# Patient Record
Sex: FEMALE | Race: WHITE | NOT HISPANIC OR LATINO | Employment: UNEMPLOYED | URBAN - NONMETROPOLITAN AREA
[De-identification: names, ages, dates, MRNs, and addresses within clinical notes are randomized per-mention and may not be internally consistent; named-entity substitution may affect disease eponyms.]

---

## 2018-02-23 ENCOUNTER — OFFICE VISIT CONVERTED (OUTPATIENT)
Dept: FAMILY MEDICINE CLINIC | Age: 48
End: 2018-02-23
Attending: NURSE PRACTITIONER

## 2018-12-21 ENCOUNTER — OFFICE VISIT CONVERTED (OUTPATIENT)
Dept: FAMILY MEDICINE CLINIC | Age: 48
End: 2018-12-21
Attending: NURSE PRACTITIONER

## 2019-01-07 ENCOUNTER — HOSPITAL ENCOUNTER (OUTPATIENT)
Dept: OTHER | Facility: HOSPITAL | Age: 49
Discharge: HOME | End: 2019-01-07
Attending: NURSE PRACTITIONER

## 2019-06-07 ENCOUNTER — OFFICE VISIT CONVERTED (OUTPATIENT)
Dept: FAMILY MEDICINE CLINIC | Age: 49
End: 2019-06-07
Attending: NURSE PRACTITIONER

## 2019-06-07 ENCOUNTER — HOSPITAL ENCOUNTER (OUTPATIENT)
Dept: OTHER | Facility: HOSPITAL | Age: 49
Discharge: HOME | End: 2019-06-07
Attending: NURSE PRACTITIONER

## 2019-06-07 LAB
ANION GAP SERPL CALC-SCNC: 17 MMOL/L (ref 8–19)
BUN SERPL-MCNC: 14 MG/DL (ref 5–25)
BUN/CREAT SERPL: 16 {RATIO} (ref 6–20)
CALCIUM SERPL-MCNC: 9.2 MG/DL (ref 8.7–10.4)
CHLORIDE SERPL-SCNC: 101 MMOL/L (ref 99–111)
CHOLEST SERPL-MCNC: 195 MG/DL (ref 107–200)
CHOLEST/HDLC SERPL: 4.6 {RATIO} (ref 3–6)
CONV CO2: 25 MMOL/L (ref 22–32)
CREAT UR-MCNC: 0.86 MG/DL (ref 0.5–0.9)
EST. AVERAGE GLUCOSE BLD GHB EST-MCNC: 126 MG/DL
GFR SERPLBLD BASED ON 1.73 SQ M-ARVRAT: >60 ML/MIN/{1.73_M2}
GLUCOSE SERPL-MCNC: 111 MG/DL (ref 65–99)
HBA1C MFR BLD: 6 % (ref 3.5–5.7)
HDLC SERPL-MCNC: 42 MG/DL (ref 40–60)
LDLC SERPL CALC-MCNC: 108 MG/DL (ref 70–100)
OSMOLALITY SERPL CALC.SUM OF ELEC: 289 MOSM/KG (ref 273–304)
POTASSIUM SERPL-SCNC: 4.4 MMOL/L (ref 3.5–5.3)
SODIUM SERPL-SCNC: 139 MMOL/L (ref 135–147)
TRIGL SERPL-MCNC: 223 MG/DL (ref 40–150)
VLDLC SERPL-MCNC: 45 MG/DL (ref 5–37)

## 2019-12-02 ENCOUNTER — OFFICE VISIT CONVERTED (OUTPATIENT)
Dept: FAMILY MEDICINE CLINIC | Age: 49
End: 2019-12-02
Attending: NURSE PRACTITIONER

## 2019-12-02 ENCOUNTER — HOSPITAL ENCOUNTER (OUTPATIENT)
Dept: OTHER | Facility: HOSPITAL | Age: 49
Discharge: HOME | End: 2019-12-02
Attending: NURSE PRACTITIONER

## 2019-12-02 LAB
ALBUMIN SERPL-MCNC: 4.6 G/DL (ref 3.5–5)
ALBUMIN/GLOB SERPL: 1.6 {RATIO} (ref 1.4–2.6)
ALP SERPL-CCNC: 81 U/L (ref 42–98)
ALT SERPL-CCNC: 26 U/L (ref 10–40)
ANION GAP SERPL CALC-SCNC: 21 MMOL/L (ref 8–19)
AST SERPL-CCNC: 20 U/L (ref 15–50)
BILIRUB SERPL-MCNC: 0.66 MG/DL (ref 0.2–1.3)
BUN SERPL-MCNC: 12 MG/DL (ref 5–25)
BUN/CREAT SERPL: 13 {RATIO} (ref 6–20)
CALCIUM SERPL-MCNC: 9.6 MG/DL (ref 8.7–10.4)
CHLORIDE SERPL-SCNC: 98 MMOL/L (ref 99–111)
CHOLEST SERPL-MCNC: 177 MG/DL (ref 107–200)
CHOLEST/HDLC SERPL: 4.5 {RATIO} (ref 3–6)
CONV CO2: 23 MMOL/L (ref 22–32)
CONV TOTAL PROTEIN: 7.5 G/DL (ref 6.3–8.2)
CREAT UR-MCNC: 0.93 MG/DL (ref 0.5–0.9)
ERYTHROCYTE [DISTWIDTH] IN BLOOD BY AUTOMATED COUNT: 12.6 % (ref 11.5–14.5)
EST. AVERAGE GLUCOSE BLD GHB EST-MCNC: 148 MG/DL
GFR SERPLBLD BASED ON 1.73 SQ M-ARVRAT: >60 ML/MIN/{1.73_M2}
GLOBULIN UR ELPH-MCNC: 2.9 G/DL (ref 2–3.5)
GLUCOSE SERPL-MCNC: 116 MG/DL (ref 65–99)
HBA1C MFR BLD: 15.7 G/DL (ref 12–16)
HBA1C MFR BLD: 6.8 % (ref 3.5–5.7)
HCT VFR BLD AUTO: 44.6 % (ref 37–47)
HDLC SERPL-MCNC: 39 MG/DL (ref 40–60)
LDLC SERPL CALC-MCNC: 97 MG/DL (ref 70–100)
MCH RBC QN AUTO: 31.8 PG (ref 27–31)
MCHC RBC AUTO-ENTMCNC: 35.2 G/DL (ref 33–37)
MCV RBC AUTO: 90.5 FL (ref 81–99)
OSMOLALITY SERPL CALC.SUM OF ELEC: 287 MOSM/KG (ref 273–304)
PLATELET # BLD AUTO: 253 10*3/UL (ref 130–400)
PMV BLD AUTO: 8.7 FL (ref 7.4–10.4)
POTASSIUM SERPL-SCNC: 4 MMOL/L (ref 3.5–5.3)
RBC # BLD AUTO: 4.93 10*6/UL (ref 4.2–5.4)
SODIUM SERPL-SCNC: 138 MMOL/L (ref 135–147)
TRIGL SERPL-MCNC: 206 MG/DL (ref 40–150)
VLDLC SERPL-MCNC: 41 MG/DL (ref 5–37)
WBC # BLD AUTO: 10.87 10*3/UL (ref 4.8–10.8)

## 2019-12-04 LAB — BACTERIA UR CULT: NORMAL

## 2020-09-16 ENCOUNTER — HOSPITAL ENCOUNTER (OUTPATIENT)
Dept: OTHER | Facility: HOSPITAL | Age: 50
Discharge: HOME | End: 2020-09-16
Attending: NURSE PRACTITIONER

## 2020-09-16 ENCOUNTER — OFFICE VISIT CONVERTED (OUTPATIENT)
Dept: FAMILY MEDICINE CLINIC | Age: 50
End: 2020-09-16
Attending: NURSE PRACTITIONER

## 2020-09-16 LAB
ALBUMIN SERPL-MCNC: 4.1 G/DL (ref 3.5–5)
ALBUMIN/GLOB SERPL: 1.6 {RATIO} (ref 1.4–2.6)
ALP SERPL-CCNC: 88 U/L (ref 42–98)
ALT SERPL-CCNC: 24 U/L (ref 10–40)
ANION GAP SERPL CALC-SCNC: 18 MMOL/L (ref 8–19)
AST SERPL-CCNC: 19 U/L (ref 15–50)
BASOPHILS # BLD AUTO: 0.08 10*3/UL (ref 0–0.2)
BASOPHILS NFR BLD AUTO: 0.7 % (ref 0–3)
BILIRUB SERPL-MCNC: 0.32 MG/DL (ref 0.2–1.3)
BUN SERPL-MCNC: 20 MG/DL (ref 5–25)
BUN/CREAT SERPL: 24 {RATIO} (ref 6–20)
CALCIUM SERPL-MCNC: 9.3 MG/DL (ref 8.7–10.4)
CHLORIDE SERPL-SCNC: 99 MMOL/L (ref 99–111)
CHOLEST SERPL-MCNC: 227 MG/DL (ref 107–200)
CHOLEST/HDLC SERPL: 5.5 {RATIO} (ref 3–6)
CONV ABS IMM GRAN: 0.05 10*3/UL (ref 0–0.2)
CONV CO2: 24 MMOL/L (ref 22–32)
CONV IMMATURE GRAN: 0.4 % (ref 0–1.8)
CONV TOTAL PROTEIN: 6.7 G/DL (ref 6.3–8.2)
CREAT UR-MCNC: 0.85 MG/DL (ref 0.5–0.9)
DEPRECATED RDW RBC AUTO: 46.1 FL (ref 36.4–46.3)
EOSINOPHIL # BLD AUTO: 0.35 10*3/UL (ref 0–0.7)
EOSINOPHIL # BLD AUTO: 2.9 % (ref 0–7)
ERYTHROCYTE [DISTWIDTH] IN BLOOD BY AUTOMATED COUNT: 13.2 % (ref 11.7–14.4)
EST. AVERAGE GLUCOSE BLD GHB EST-MCNC: 157 MG/DL
GFR SERPLBLD BASED ON 1.73 SQ M-ARVRAT: >60 ML/MIN/{1.73_M2}
GLOBULIN UR ELPH-MCNC: 2.6 G/DL (ref 2–3.5)
GLUCOSE SERPL-MCNC: 178 MG/DL (ref 65–99)
HBA1C MFR BLD: 7.1 % (ref 3.5–5.7)
HCT VFR BLD AUTO: 47 % (ref 37–47)
HDLC SERPL-MCNC: 41 MG/DL (ref 40–60)
HGB BLD-MCNC: 15.6 G/DL (ref 12–16)
LDLC SERPL CALC-MCNC: 142 MG/DL (ref 70–100)
LYMPHOCYTES # BLD AUTO: 3.36 10*3/UL (ref 1–5)
LYMPHOCYTES NFR BLD AUTO: 27.7 % (ref 20–45)
MCH RBC QN AUTO: 31.5 PG (ref 27–31)
MCHC RBC AUTO-ENTMCNC: 33.2 G/DL (ref 33–37)
MCV RBC AUTO: 94.9 FL (ref 81–99)
MONOCYTES # BLD AUTO: 0.78 10*3/UL (ref 0.2–1.2)
MONOCYTES NFR BLD AUTO: 6.4 % (ref 3–10)
NEUTROPHILS # BLD AUTO: 7.52 10*3/UL (ref 2–8)
NEUTROPHILS NFR BLD AUTO: 61.9 % (ref 30–85)
NRBC CBCN: 0 % (ref 0–0.7)
OSMOLALITY SERPL CALC.SUM OF ELEC: 291 MOSM/KG (ref 273–304)
PLATELET # BLD AUTO: 279 10*3/UL (ref 130–400)
PMV BLD AUTO: 9.4 FL (ref 9.4–12.3)
POTASSIUM SERPL-SCNC: 4.3 MMOL/L (ref 3.5–5.3)
RBC # BLD AUTO: 4.95 10*6/UL (ref 4.2–5.4)
SODIUM SERPL-SCNC: 137 MMOL/L (ref 135–147)
TRIGL SERPL-MCNC: 458 MG/DL (ref 40–150)
TSH SERPL-ACNC: 1.85 M[IU]/L (ref 0.27–4.2)
WBC # BLD AUTO: 12.14 10*3/UL (ref 4.8–10.8)

## 2020-11-17 ENCOUNTER — HOSPITAL ENCOUNTER (OUTPATIENT)
Dept: OTHER | Facility: HOSPITAL | Age: 50
Discharge: HOME | End: 2020-11-17
Attending: NURSE PRACTITIONER

## 2021-01-11 ENCOUNTER — HOSPITAL ENCOUNTER (OUTPATIENT)
Dept: OTHER | Facility: HOSPITAL | Age: 51
Discharge: HOME | End: 2021-01-11
Attending: NURSE PRACTITIONER

## 2021-01-11 ENCOUNTER — OFFICE VISIT CONVERTED (OUTPATIENT)
Dept: FAMILY MEDICINE CLINIC | Age: 51
End: 2021-01-11
Attending: NURSE PRACTITIONER

## 2021-01-11 LAB
ALBUMIN SERPL-MCNC: 4.1 G/DL (ref 3.5–5)
ALBUMIN/GLOB SERPL: 1.6 {RATIO} (ref 1.4–2.6)
ALP SERPL-CCNC: 80 U/L (ref 42–98)
ALT SERPL-CCNC: 30 U/L (ref 10–40)
ANION GAP SERPL CALC-SCNC: 14 MMOL/L (ref 8–19)
AST SERPL-CCNC: 23 U/L (ref 15–50)
BASOPHILS # BLD MANUAL: 0.05 10*3/UL (ref 0–0.2)
BASOPHILS NFR BLD MANUAL: 0.6 % (ref 0–3)
BILIRUB SERPL-MCNC: 0.66 MG/DL (ref 0.2–1.3)
BUN SERPL-MCNC: 11 MG/DL (ref 5–25)
BUN/CREAT SERPL: 13 {RATIO} (ref 6–20)
CALCIUM SERPL-MCNC: 8.9 MG/DL (ref 8.7–10.4)
CHLORIDE SERPL-SCNC: 101 MMOL/L (ref 99–111)
CHOLEST SERPL-MCNC: 197 MG/DL (ref 107–200)
CHOLEST/HDLC SERPL: 4.9 {RATIO} (ref 3–6)
CONV CO2: 26 MMOL/L (ref 22–32)
CONV CREATININE URINE, RANDOM: 61.4 MG/DL (ref 10–300)
CONV MICROALBUM.,U,RANDOM: 926.6 MG/L (ref 0–20)
CONV TOTAL PROTEIN: 6.7 G/DL (ref 6.3–8.2)
CREAT UR-MCNC: 0.86 MG/DL (ref 0.5–0.9)
DEPRECATED RDW RBC AUTO: 44.4 FL
EOSINOPHIL # BLD MANUAL: 0.24 10*3/UL (ref 0–0.7)
EOSINOPHIL NFR BLD MANUAL: 3 % (ref 0–7)
ERYTHROCYTE [DISTWIDTH] IN BLOOD BY AUTOMATED COUNT: 13 % (ref 11.5–14.5)
EST. AVERAGE GLUCOSE BLD GHB EST-MCNC: 148 MG/DL
GFR SERPLBLD BASED ON 1.73 SQ M-ARVRAT: >60 ML/MIN/{1.73_M2}
GLOBULIN UR ELPH-MCNC: 2.6 G/DL (ref 2–3.5)
GLUCOSE SERPL-MCNC: 129 MG/DL (ref 65–99)
GRANS (ABSOLUTE): 3.99 10*3/UL (ref 2–8)
GRANS: 50.5 % (ref 30–85)
HBA1C MFR BLD: 15.2 G/DL (ref 12–16)
HBA1C MFR BLD: 6.8 % (ref 3.5–5.7)
HCT VFR BLD AUTO: 44 % (ref 37–47)
HDLC SERPL-MCNC: 40 MG/DL (ref 40–60)
IMM GRANULOCYTES # BLD: 0.02 10*3/UL (ref 0–0.54)
IMM GRANULOCYTES NFR BLD: 0.3 % (ref 0–0.43)
LDLC SERPL CALC-MCNC: 101 MG/DL (ref 70–100)
LYMPHOCYTES # BLD MANUAL: 3.05 10*3/UL (ref 1–5)
LYMPHOCYTES NFR BLD MANUAL: 7 % (ref 3–10)
MCH RBC QN AUTO: 31.9 PG (ref 27–31)
MCHC RBC AUTO-ENTMCNC: 34.5 G/DL (ref 33–37)
MCV RBC AUTO: 92.2 FL (ref 81–99)
MICROALBUMIN/CREAT UR: 1509.1 MG/G{CRE} (ref 0–35)
MONOCYTES # BLD AUTO: 0.55 10*3/UL (ref 0.2–1.2)
OSMOLALITY SERPL CALC.SUM OF ELEC: 283 MOSM/KG (ref 273–304)
PLATELET # BLD AUTO: 262 10*3/UL (ref 130–400)
PMV BLD AUTO: 8.6 FL (ref 7.4–10.4)
POTASSIUM SERPL-SCNC: 4.7 MMOL/L (ref 3.5–5.3)
RBC # BLD AUTO: 4.77 10*6/UL (ref 4.2–5.4)
SODIUM SERPL-SCNC: 136 MMOL/L (ref 135–147)
TRIGL SERPL-MCNC: 278 MG/DL (ref 40–150)
TSH SERPL-ACNC: 1.89 M[IU]/L (ref 0.27–4.2)
VARIANT LYMPHS NFR BLD MANUAL: 38.6 % (ref 20–45)
VLDLC SERPL-MCNC: 56 MG/DL (ref 5–37)
WBC # BLD AUTO: 7.9 10*3/UL (ref 4.8–10.8)

## 2021-01-13 ENCOUNTER — HOSPITAL ENCOUNTER (OUTPATIENT)
Dept: OTHER | Facility: HOSPITAL | Age: 51
Discharge: HOME | End: 2021-01-13
Attending: NURSE PRACTITIONER

## 2021-05-18 NOTE — PROGRESS NOTES
Caitlin Escalera 1970     Office/Outpatient Visit    Visit Date: Fri, Dec 21, 2018 10:34 am    Provider: Sylvia Ervin N.P. (Assistant: Sarah Spurling, MA)    Location: [facility]        Electronically signed by Sylvia Ervin N.P. on  12/22/2018 11:01:26 AM                             SUBJECTIVE:        CC:     Ms. Escalera is a 48 year old White female.  Med refills. Prevenative Exam.;         HPI:         Patient complains of health checkup.  Her last physical exam was 1 year ago.  She is status-post hysterectomy.  She performs breast self-exams occasionally.    Her last Pap smear was 4 years ago years ago.   Her last mammogram was 1 year ago.   Her last DEXA was over 5 years ago years ago.   Preventative Health updated today.  Ms. Escalera denies any history of abnormal Pap smears.  Tobacco: Current Smoker: She currently smokes every day, 1/2 to 1 pack per day.          PHQ-9 Depression Screening: Completed form scanned and in chart; Total Score 1 Alcohol Consumption Screening: Completed form scanned and in chart; Total Score 0         Complaint of bilateral polycystic ovarian syndrome..  history of - no current complaints or symptoms regarding this condition         Complaint of overweight..  Caitlin has been actively trying to lose weight  - has been decreasing carbs  - but the holidays have set her back a little  - no concerns today         In regard to the type 2 diabetes, specifically, this is type 2, non-insulin requiring diabetes.  Compliance with treatment has been poor; she skips some medication doses due to inconvenience of dosing and does not follow a diet and exercise regimen.  She follows no particular diet.  Primary symptoms reported include leg cramps.  She specifically denies blurred vision.  Depression screen is performed and is negative.      Tobacco screen: Current smoker.  Current meds include an oral hypoglycemic ( Glucophage ( 500mg bid ) ).  She reports home blood  glucose readings have been excellent, with average fasting glucoses running <120 mg/dL.  Most recent lab results include Creatinine, Serum:  0.85 (mg/dl) (02/23/2018), Glom Filt Rate, Est:  >60 (ml/min/1.73m2) (02/23/2018), Estimated Avg Glucose:  120 (mg/dL) (02/23/2018), Hemoglobin A1c:  5.8 (%) (02/23/2018).  Blood pressure has been high normal.  In regard to preventative care, not had an eye exam in some time   - reports that her right eye is a 'lazy eye'.      Physical Activity: ** Never exercises; Concurrent health problems include hypertension.          With regard to the gERD, regarding GERD - taking ranitadine OTC must take 2/day to help relieve symptoms         Concerning hyperlipidemia, compliance with treatment has been good.  She specifically denies associated symptoms, including muscle pain and weakness.  Most recent lab tests include Total Cholesterol:  146 (mg/dL) (02/23/2018), HDL:  47 (mg/dL) (02/23/2018), Triglycerides:  177 (mg/dL) (02/23/2018), LDL:  64 (mg/dL) (02/23/2018), VLDL Cholesterol:  35 (mg/dl) (02/23/2018), CHOL/HDLC RATIO:  3.1 (NA) (02/23/2018).  Concurrent health problems include diabetes.      uses clindamycin gel PRN     ROS:     CONSTITUTIONAL:  Negative for chills, fatigue and fever.      EYES:  Negative for blurred vision.      E/N/T:  Negative for ear pain and sore throat.      CARDIOVASCULAR:  Negative for chest pain and pedal edema.      RESPIRATORY:  Negative for recent cough and dyspnea.      GASTROINTESTINAL:  Positive for acid reflux symptoms and heartburn.   Negative for abdominal pain, constipation, diarrhea, nausea or vomiting.      GENITOURINARY:  Negative for dysuria and change in urine stream.      MUSCULOSKELETAL:  Positive for occasional cramping in her legs.   Negative for arthralgias or myalgias.      INTEGUMENTARY/BREAST:  Positive for moles / skin tags came up on her face (left side under eye) -  has several skin tags that she would like removed.   Negative  for pruritis or rash.      NEUROLOGICAL:  Negative for dizziness, headaches and paresthesias.      ENDOCRINE:  Negative for hair loss, polydipsia and polyphagia.      ALLERGIC/IMMUNOLOGIC:  Negative for seasonal allergies.      PSYCHIATRIC:  Negative for anxiety, depression, feelings of stress, sleep disturbance and suicidal thoughts.          PMH/FMH/SH:     Last Reviewed on 12/21/2018 11:17 AM by Sylvia Ervin    Past Medical History:                 PAST MEDICAL HISTORY         solitary working  left kidney         PREVENTIVE HEALTH MAINTENANCE             EYE EXAM: was last done been a while     MAMMOGRAM: was last done 10/3/17 with normal results     PAP SMEAR: was last done 9/2016 with normal results         Surgical History:         Cholecystectomy      Bilateral Tubal Ligation         Family History:         Positive for Type 2 Diabetes ( father ).  Father: Congestive Heart Failure;  Type 2 Diabetes     Mother: Hypothyroidism         Social History:     Occupation: Disabled (due to shoulder surgery)     Marital Status:      Children: 3 children         Tobacco/Alcohol/Supplements:     Last Reviewed on 12/21/2018 11:17 AM by Sylvia Ervin    Tobacco: Current Smoker: She currently smokes every day, 1/2 pack per day.          Supplements:  Patient admits to use of multivitamin.          Substance Abuse History:     Last Reviewed on 12/21/2018 11:17 AM by Sylvia Ervin    None         Mental Health History:     Last Reviewed on 12/21/2018 11:17 AM by Sylvia Ervin        depression         Communicable Diseases (eg STDs):     Last Reviewed on 12/21/2018 11:17 AM by Sylvia Ervin    Reportable health conditions; NEGATIVE             Current Problems:     Last Reviewed on 12/21/2018 11:17 AM by Sylvia Ervin    Lower back pain     Type 2 diabetes     Hyperlipidemia     Depression     Overweight     GERD     Bilateral polycystic ovarian syndrome     Microscopic hematuria     Screening  mammogram - other     Skin tag     Screening for depression     Dyshidrosis         Immunizations:     Td adult 4/7/2011     Hep B (adult dose) 4/7/2011     Hep B (adult dose) 5/17/2011     Hep B (adult dose) 10/22/2012     MMR  (Measles-Mumps-Rubella), live 4/7/2011     Fluzone (3 + years dose) 9/1/2017         Allergies:     Last Reviewed on 12/21/2018 11:17 AM by Sylvia Ervin    Demerol HCl:    Morphine: shortness of breath        Current Medications:     Last Reviewed on 12/21/2018 11:17 AM by Sylvia Ervin    Sertraline HCl 100mg Tablet 1 and 1/2 tablets daily     Accu-Chek Rossy Plus Meter  Kit check blood sugar 2-3 times daily  DXE11.9     Lancet   Lancet Check blood once daily as directed     Metformin HCl 500mg Tablets, Extended Release 1 tab bid     Accu-Chek Rossy Plus Test Strips  Reagent Strips check blood sugar bid  DX E11.9     Crestor 10mg Tablet 1 tab daily         OBJECTIVE:        Vitals:         Historical:     12/15/2017  Wt:   183.6lbs    09/14/2016  Wt:   187.5lbs        Current: 12/21/2018 10:44:40 AM    Ht:  5 ft, 2.25 in;  Wt: 182.4 lbs;  BMI: 33.1    T: 97.9 F (oral);  BP: 142/79 mm Hg (left arm, sitting);  P: 94 bpm (left arm (BP Cuff), sitting);  sCr: 0.85 mg/dL;  GFR: 86.20    VA: 20/70 OD, 20/20 OS (without correction)        Repeat:     10:44:59 AM     VA:    (20/70 OD,  (without correction, , 20/20 OS, , 20/20 both))         Exams:     PHYSICAL EXAM:     GENERAL: vital signs recorded - well developed, well nourished, obese;  no apparent distress;     EYES: extraocular movements intact; PERRL;     E/N/T: EARS: external auditory canal normal;  bilateral TMs are normal;  NOSE:  normal nasal mucosa, septum, turbinates, and sinuses; OROPHARYNX:  normal mucosa, dentition, gingiva, and posterior pharynx;     NECK: range of motion is normal;     RESPIRATORY: normal appearance and symmetric expansion of chest wall; normal respiratory rate and pattern with no distress; normal breath  sounds with no rales, rhonchi, wheezes or rubs;     CARDIOVASCULAR: normal rate; rhythm is regular;  no edema;     GASTROINTESTINAL: nontender; normal bowel sounds; no organomegaly;     LYMPHATIC: no enlargement of cervical or facial nodes; no supraclavicular nodes;     MUSCULOSKELETAL: normal gait; normal range of motion of all major muscle groups; no limb or joint pain with range of motion;     NEUROLOGIC: mental status: alert and oriented x 3;     PSYCHIATRIC: appropriate affect and demeanor; normal speech pattern; normal thought and perception;         ASSESSMENT           V70.0   Z00.00  Health checkup              DDx:     V79.0   Z13.89  Screening for depression              DDx:     256.4   E28.2  Bilateral polycystic ovarian syndrome              DDx:     278.02   E66.3  Overweight              DDx:     250.00   E11.9  Type 2 diabetes              DDx:     530.81   R12  GERD              DDx:     296.20   F34.1  Depression              DDx:     272.4   E78.5  Hyperlipidemia              DDx:     705.81   L30.1  Dyshidrosis              DDx:     701.9   D48.5  Skin tag              DDx:     V76.12   Z12.31  Screening mammogram - other              DDx:         ORDERS:         Radiology/Test Orders:       [id number]  Screening mammography, bilateral (2-view film study of each breast)  (Send-Out)           Lab Orders:       [id number]  BDCB2 - [facility] CBC w/o diff  (Send-Out)         88005  COMP - [facility] Comp. Metabolic Panel  (Send-Out)         72371  TSH - [facility] TSH  (Send-Out)         52418  BDUAM - [facility] Urinalysis, automated, with micro  (Send-Out)         38773  A1CEG - [facility] Hemoglobin A1C  (Send-Out)         75017  LPDP [facility] Lipid Panel  (Send-Out)           Other Orders:         Depression screen negative  (In-House)         1101F  Pt screen for fall risk; document no falls in past year or only 1 fall w/o injury in past year (KATHLEEN)  (In-House)         4004F  Pt scrnd tobacco use rcvd tobacco cessation talk  (In-House)            Negative EtOH screen  (In-House)           Calculated BMI above the upper parameter and a follow-up plan was documented in the medical record  (In-House)                   PLAN:          Health checkup     LABORATORY:  Labs ordered to be performed today include CBC W/O DIFF, Comprehensive metabolic panel, lipid panel, TSH, and urinalysis with micro.  [facility] PHQ-9 Depression Screening Completed form scanned and in chart; Total Score 0   Smoking cessation encouraged. Counseling for less than 3 minutes.  Negative alcohol screen     BMI Elevated - Follow-Up Plan: She was provided education on weight loss strategies           Orders:       [id number]  BDCB2 - [facility] CBC w/o diff  (Send-Out)         80588  COMP - [facility] Comp. Metabolic Panel  (Send-Out)         79639  TSH - [facility] TSH  (Send-Out)         80851  BDUAM - [facility] Urinalysis, automated, with micro  (Send-Out)         59424  LPDP - [facility] Lipid Panel  (Send-Out)           Depression screen negative  (In-House)         1101F  Pt screen for fall risk; document no falls in past year or only 1 fall w/o injury in past year (KATHLEEN)  (In-House)         4004F  Pt scrnd tobacco use rcvd tobacco cessation talk  (In-House)           Negative EtOH screen  (In-House)           Calculated BMI above the upper parameter and a follow-up plan was documented in the medical record  (In-House)            Screening for depression as above          Bilateral polycystic ovarian syndrome follow up with GYN PRN          Overweight continue current attempt at weight loss- low calorie, low carb, high protein          Type 2 diabetes Will check labs to see if any changes should be made to medication before resuming - follow up every 3 months in office until numbers stable     LABORATORY:  Labs ordered to be performed today include HgbA1C.            Orders:       [id number]  A1CEG - [facility] Hemoglobin A1C  (Send-Out)            GERD continue current medication She may need an EGD at some  point - weight loss, smoking cessation and dietary changes are also of benefit          Depression continue on sertraline as prescribed - follow up every 6 months          Hyperlipidemia will check labs continue taking every other night          Dyshidrosis ok to fill topical as needed - follow up annually          Skin tag Caitlin is going to go home and see how many skin tags she would like to have removed- she has a couple on her face - I will refer these to other provider in office given the location  - We will make her an appt to have these removed once we see how many she is wishing to have removed in order to allot appropriate time          Screening mammogram - other Caitlin thinks she may have had a mammogram last year at [facility] - we will try to obtain the records and if not available, will need to get an updated mammogram (our records indicate 2016 last exam)         FOLLOW-UP TESTING #1:    RADIOLOGY:  I have ordered screening mammogram to be done today.            Orders:       [id number]  Screening mammography, bilateral (2-view film study of each breast)  (Send-Out)               CHARGE CAPTURE           **Please note: ICD descriptions below are intended for billing purposes only and may not represent clinical diagnoses**        Primary Diagnosis:         V70.0 Health checkup            Z00.00    Encounter for general adult medical examination without abnormal findings              Orders:          [id number]   Preventive medicine, established patient, age 40-64 years  (In-House)                Depression screen negative  (In-House)             1101F   Pt screen for fall risk; document no falls in past year or only 1 fall w/o injury in past year (KATHLEEN)  (In-House)             4004F   Pt scrnd tobacco use rcvd tobacco cessation talk  (In-House)                Negative EtOH screen  (In-House)                Calculated BMI above the upper parameter and a follow-up plan was documented in the medical record   (In-House)           V79.0 Screening for depression            Z13.89    Encounter for screening for other disorder              Orders:          [id number] -25  Office/outpatient visit; established patient, level 4  (In-House)           256.4 Bilateral polycystic ovarian syndrome            E28.2    Polycystic ovarian syndrome    278.02 Overweight            E66.3    Overweight    250.00 Type 2 diabetes            E11.9    Type 2 diabetes mellitus without complications    530.81 GERD            R12    Heartburn    296.20 Depression            F34.1    Dysthymic disorder    272.4 Hyperlipidemia            E78.5    Hyperlipidemia, unspecified    705.81 Dyshidrosis            L30.1    Dyshidrosis [pompholyx]    701.9 Skin tag            D48.5    Neoplasm of uncertain behavior of skin    V76.12 Screening mammogram - other            Z12.31    Encounter for screening mammogram for malignant neoplasm of breast        ADDENDUMS:      ____________________________________    Addendum: 01/09/2019 12:58 PM - Nicole Wheatley         Visit Note Faxed to:        Levon Starr  (Nephrology); Number [phone]

## 2021-05-18 NOTE — PROGRESS NOTES
Caitlin Escalera 1970     Office/Outpatient Visit    Visit Date: Fri, Jun 7, 2019 08:37 am    Provider: Sylvia Ervin N.P. (Assistant: Eri Silverio MA)    Location: [facility]        Electronically signed by Sylvia Ervin N.P. on  06/07/2019 10:35:49 AM                             SUBJECTIVE:        CC: (HASNT TAKEN SERTRALINE IN A MONTH) (HASNT HAD CRESTOR FOR A MONTH ALSO)     Ms. Escalera is a 49 year old White female.  This is a follow-up visit.          HPI:         Patient presents with type 2 diabetes.  Specifically, this is type 2, non-insulin requiring diabetes.  Compliance with treatment has been fair.  Patient's diabetes was first diagnosed 2 years ago.  She follows no particular diet.      Tobacco screen: Current smoker.  Current meds include an oral hypoglycemic.  She reports home blood glucose readings have been fairly good, with average fasting glucoses running in the 120-150 mg/dL range.  Most recent lab results include Estimated Avg Glucose:  143 (mg/dL) (12/15/2017),  120 (mg/dL) (02/23/2018),  137 (mg/dL) (12/21/2018), Hemoglobin A1c:  6.6 (%) (12/15/2017),  5.8 (%) (02/23/2018),  6.4 (%) (12/21/2018).  Has not fallen recently Concurrent health problems include hypertension and hypercholesterolemia.          With regard to the hyperlipidemia, compliance with treatment has been poor; she has not been taking her medications due to has been out for a month.  She specifically denies associated symptoms, including muscle pain and weakness.  Most recent lab tests include Total Cholesterol:  146 (mg/dL) (02/23/2018),  220 (mg/dL) (12/21/2018), HDL:  47 (mg/dL) (02/23/2018),  39 (mg/dL) (12/21/2018), Triglycerides:  177 (mg/dL) (02/23/2018),  373 (mg/dL) (12/21/2018), LDL:  64 (mg/dL) (02/23/2018),  106 (mg/dL) (12/21/2018), VLDL Cholesterol:  35 (mg/dl) (02/23/2018),  75 (mg/dl) (12/21/2018), CHOL/HDLC RATIO:  3.1 (NA) (02/23/2018),  5.6 (NA) (12/21/2018).  Concurrent health  problems include diabetes and hypertension.      Monitored by Nephrology - has her BP controlled and is taking medication as prescribed - lat appt May 2019 - she reports going semi-annually- he is obtaining labs from his office  Not monitoring A1C that I could find     ROS:     CONSTITUTIONAL:  Positive for fatigue ( feels like this since her BP is so much lower ).   Negative for chills or fever.      CARDIOVASCULAR:  Negative for chest pain and pedal edema.      RESPIRATORY:  Negative for recent cough and dyspnea.      GASTROINTESTINAL:  Positive for diarrhea ( depending on what she is eating ) and heartburn ( when not taking the medicaton ).   Negative for abdominal pain, constipation, nausea or vomiting.      GENITOURINARY:  Negative for dysuria, change in urine stream and frequent urination.      MUSCULOSKELETAL:  Negative for back pain.      PSYCHIATRIC:  Positive for feelings of stress ( (time to time) ).   Negative for anxiety, crying spells, depression, anhedonia, sadness, sleep disturbance or suicidal thoughts.          PMH/FMH/SH:     Last Reviewed on 12/21/2018 11:17 AM by Sylvia Ervin    Past Medical History:                 PAST MEDICAL HISTORY         solitary working  left kidney         PREVENTIVE HEALTH MAINTENANCE             EYE EXAM: was last done been a while     MAMMOGRAM: was last done 2016 with normal results     PAP SMEAR: was last done 9/2016 with normal results         Surgical History:         Cholecystectomy      Hysterectomy: 2014;     Bilateral Tubal Ligation         Family History:         Positive for Type 2 Diabetes ( father ).  Father: Congestive Heart Failure;  Type 2 Diabetes     Mother: Hypothyroidism         Social History:     Occupation: Disabled (due to shoulder surgery)     Marital Status:      Children: 3 children         Tobacco/Alcohol/Supplements:     Last Reviewed on 12/21/2018 11:17 AM by Sylvia Ervin    Tobacco: Current Smoker: She currently smokes  every day, 1/2 to 1 pack per day.          Supplements:  Patient admits to use of multivitamin.          Substance Abuse History:     Last Reviewed on 12/21/2018 11:17 AM by Sylvia Ervin    None         Mental Health History:     Last Reviewed on 12/21/2018 11:17 AM by Sylvia Ervin        depression         Communicable Diseases (eg STDs):     Last Reviewed on 12/21/2018 11:17 AM by Sylvia Ervin    Reportable health conditions; NEGATIVE             Current Problems:     Last Reviewed on 12/21/2018 11:17 AM by Sylvia Ervin    Proteinuria     Solitary kidney     Lower back pain     Type 2 diabetes     Hyperlipidemia     Depression     Overweight     GERD     Bilateral polycystic ovarian syndrome     Microscopic hematuria     Dyshidrosis         Immunizations:     Td adult 4/7/2011     Hep B (adult dose) 4/7/2011     Hep B (adult dose) 5/17/2011     Hep B (adult dose) 10/22/2012     MMR  (Measles-Mumps-Rubella), live 4/7/2011     Fluzone (3 + years dose) 9/1/2017         Allergies:     Last Reviewed on 6/07/2019 08:43 AM by Eri Silverio    Demerol HCl:    Morphine: shortness of breath        Current Medications:     Last Reviewed on 6/07/2019 08:45 AM by Eri Silverio    Sertraline HCl 100mg Tablet 1 and 1/2 tablets daily     Accu-Chek Rossy Plus Meter  Kit check blood sugar 2-3 times daily  DXE11.9     Lancet   Lancet Check blood once daily as directed     Metformin HCl 500mg Tablets, Extended Release 1 tab bid     Candesartan Cilexetil 16mg Tablet Take 1 tablet(s) by mouth daily     Omeprazole 20mg Capsules, Extended Release 1 capsule daily         OBJECTIVE:        Vitals:         Current: 6/7/2019 8:48:53 AM    Ht:  5 ft, 2.25 in;  Wt: 183.4 lbs;  BMI: 33.3    T: 97.8 F (oral);  BP: 107/71 mm Hg (left arm, sitting);  P: 88 bpm (left arm (BP Cuff), sitting);  sCr: 0.93 mg/dL;  GFR: 78.14        Exams:     PHYSICAL EXAM:     GENERAL: vital signs recorded - well developed, well nourished;   no apparent distress;     NECK: range of motion is normal;     RESPIRATORY: normal appearance and symmetric expansion of chest wall; normal respiratory rate and pattern with no distress; normal breath sounds with no rales, rhonchi, wheezes or rubs;     CARDIOVASCULAR: normal rate; rhythm is regular;  no edema;     MUSCULOSKELETAL: normal gait; normal range of motion of all major muscle groups; no limb or joint pain with range of motion;     NEUROLOGIC: mental status: alert and oriented x 3;     PSYCHIATRIC: appropriate affect and demeanor; normal speech pattern; normal thought and perception;         ASSESSMENT           250.00   E11.9  Type 2 diabetes              DDx:     272.4   E78.5  Hyperlipidemia              DDx:     791.0   R80.9  Proteinuria              DDx:         ORDERS:         Meds Prescribed:       Atorvastatin Calcium 40mg Tablet 1 tab daily  #90 (Ninety) tablet(s) Refills: 1       Refill of: Sertraline HCl 100mg Tablet 1 and 1/2 tablets daily  #135 (One [place] [address]) tablet(s) Refills: 1       Refill of: Omeprazole 20mg Capsules, Extended Release 1 capsule daily  #90 (Ninety) capsule(s) Refills: 0         Lab Orders:       [id number]  [facility] Basic Metabolic Panel  (Send-Out)         [id number]  [facility] Hemoglobin A1C  (Send-Out)         [id number]  [facility] Lipid Panel  (Send-Out)                   PLAN:          Type 2 diabetes continue with the metformin as prescribed  - may need to increase frequency to 2 tabs BID     LABORATORY:  Labs ordered to be performed today include basic metabolic panel and HgbA1C.            Orders:       [id number]  [facility] Basic Metabolic Panel  (Send-Out)         50842  [facility] Hemoglobin A1C  (Send-Out)            Hyperlipidemia changed to atorvastatin to try to help with the cost     LABORATORY:  Labs ordered to be performed today include lipid panel.            Prescriptions:       Atorvastatin Calcium 40mg Tablet 1 tab daily  #90 (Ninety) tablet(s)  Refills: 1           Orders:       [id number]  [facility] Lipid Panel  (Send-Out)            Proteinuria follow up with Dr. Alexandra/ Blanca as recommended             Other Prescriptions:       Refill of: Sertraline HCl 100mg Tablet 1 and 1/2 tablets daily  #135 (One [place] [address]) tablet(s) Refills: 1       Refill of: Omeprazole 20mg Capsules, Extended Release 1 capsule daily  #90 (Ninety) capsule(s) Refills: 0         CHARGE CAPTURE           **Please note: ICD descriptions below are intended for billing purposes only and may not represent clinical diagnoses**        Primary Diagnosis:         250.00 Type 2 diabetes            E11.9    Type 2 diabetes mellitus without complications              Orders:          [id number]   Office/outpatient visit; established patient, level 4  (In-House)           272.4 Hyperlipidemia            E78.5    Hyperlipidemia, unspecified    791.0 Proteinuria            R80.9    Proteinuria, unspecified

## 2021-05-18 NOTE — PROGRESS NOTES
Caitlin Escalera 1970     Office/Outpatient Visit    Visit Date: Fri, Feb 23, 2018 09:12 am    Provider: Sylvia Ervin N.P. (Assistant: Talia Wilson MA)    Location: [facility]        Electronically signed by Sylvia Ervin N.P. on  02/25/2018 09:17:58 PM                             SUBJECTIVE:        CC:     Ms. Escalera is a 48 year old White female.  Check Blood sugar/ Discuss Medications;         HPI:         Ms. Escalera presents with type 2 diabetes.  Ms. Escalera has type 2, non-insulin requiring diabetes.  Compliance with treatment has been fair; she skips some medication doses due to side effects.  Patient's diabetes was first diagnosed 3 months ago.  Primary symptoms reported include reports that when she takes the medication she has been having what she feels is kidney pain on the left side - she stopped the medication and reports that the pain did improve - She would like to try to get on an injectable medication if possible.  Current meds include an oral hypoglycemic ( Glucophage ).  Not applicable She reports home blood glucose readings have been excellent, with average fasting glucoses running <120 mg/dL. have averaged fasting readings in the one check was in the 140's mg/dL range.  Most recent lab results include.  Glucose, Serum:  129 (mg/dL) (12/15/2017), Hemoglobin A1c:  6.6 (%) (12/15/2017), Creatinine, Serum:  0.95 (mg/dl) (12/15/2017), BUN/Creatinine Ratio:  12 (Ratio) (12/15/2017), Glom Filt Rate, Est:  >60 (ml/min/1.73m2) (12/15/2017)         With regard to the gERD, the location of the discomfort is primarily epigastric and currently taking omeprazole and reports that her symptoms are well controlled on this medication..          Additionally, she presents with history of depression.  this is a routine follow-up.  Caitlin has been on sertraline in the past and reports that she is trying to stop - has reduced her dose and would like a refill as she feels her symptoms are  returning         With regard to the hyperlipidemia, she cannot recall when the diagnosis of hypercholesterolemia was made.  Current treatment includes a lipid lowering agent.  Compliance with treatment has been fair; she takes only every other day.  Most recent lab tests include Total Cholesterol:  176 (mg/dL) (12/15/2017), HDL:  43 (mg/dL) (12/15/2017), Triglycerides:  222 (mg/dL) (12/15/2017), LDL:  89 (mg/dL) (12/15/2017), VLDL Cholesterol:  44 (mg/dl) (12/15/2017), CHOL/HDLC RATIO:  4.1 (12/15/2017).  Concurrent health problems include diabetes.          Complaint of dyshidrosis..  Caitlin is currently taking Minocycline for her Dyshidrosis but report s that she has occasional flares on the bilateral groin/ thighs that she would like to increase the minocycline.      ROS:     CONSTITUTIONAL:  Negative for chills, fatigue and fever.      CARDIOVASCULAR:  Negative for chest pain and pedal edema.      RESPIRATORY:  Negative for recent cough and dyspnea.      GASTROINTESTINAL:  Negative for abdominal pain, constipation, diarrhea, heartburn, nausea and vomiting.      GENITOURINARY:  Negative for dysuria and change in urine stream.      MUSCULOSKELETAL:  Negative for arthralgias and myalgias.      INTEGUMENTARY/BREAST:  Positive for rash intermittent the the bilateral groin.      ENDOCRINE:  Negative for hair loss, polydipsia and polyphagia.      ALLERGIC/IMMUNOLOGIC:  Negative for seasonal allergies.      PSYCHIATRIC:  Positive for feelings of stress.   Negative for anxiety, depression, sadness, sleep disturbance or suicidal thoughts.          PMH/FMH/SH:     Last Reviewed on 12/15/2017 10:00 PM by Sylvia Ervin    Past Medical History:                 PAST MEDICAL HISTORY         solitary working  left kidney         PREVENTIVE HEALTH MAINTENANCE             MAMMOGRAM: was last done 10/3/16     PAP SMEAR: was last done 9/2016 with normal results         Surgical History:         Cholecystectomy      Bilateral  Tubal Ligation         Family History:         Positive for Type 2 Diabetes ( father ).  Father: Congestive Heart Failure;  Type 2 Diabetes     Mother: Hypothyroidism         Social History:     Occupation: Disabled (due to shoulder surgery)     Marital Status:      Children: 3 children         Tobacco/Alcohol/Supplements:     Last Reviewed on 2/23/2018 09:15 AM by Talia Wilson    Tobacco: Current Smoker: She currently smokes every day, 1/2 pack per day.          Supplements:  Patient admits to use of multivitamin.          Substance Abuse History:     None         Mental Health History:         depression         Communicable Diseases (eg STDs):     Reportable health conditions; NEGATIVE             Current Problems:     Last Reviewed on 12/15/2017 10:00 PM by Sylvia Ervin    Lower back pain     Type 2 diabetes     Hyperlipidemia     Depression     Overweight     GERD     Bilateral polycystic ovarian syndrome     Microscopic hematuria     Low back pain     Malaise and Fatigue     Dyshidrosis         Immunizations:     Td adult 4/7/2011     Hep B (adult dose) 4/7/2011     Hep B (adult dose) 5/17/2011     Hep B (adult dose) 10/22/2012     MMR  (Measles-Mumps-Rubella), live 4/7/2011     Fluzone (3 + years dose) 9/1/2017         Allergies:     Last Reviewed on 2/23/2018 09:15 AM by Talia Wilson    Demerol HCl:    Morphine: shortness of breath        Current Medications:     Last Reviewed on 2/23/2018 09:16 AM by Talia Wilson    Lancet   Lancet Check blood once daily as directed     Accu-Chek Rossy Plus Test Strips  Reagent Strips check BS 2-3 X daily DX E11.9     Chantix 0.5mg Tablet 1 tab daily     Omeprazole 20mg Capsules, Extended Release 1 capsule daily     Minocycline HCl 100mg Capsules 1 po daily     Accu-Chek Rossy Plus Meter  Kit check blood sugar 2-3 times daily  DXE11.9     Sertraline HCl 100mg Tablet 1 and 1/2 tablets daily         OBJECTIVE:        Vitals:         Current: 2/23/2018  9:15:20 AM    Ht:  5 ft, 2.25 in;  Wt: 180.5 lbs;  BMI: 32.7    T: 98.9 F (oral);  BP: 149/86 mm Hg (left arm, sitting);  P: 86 bpm (left arm (BP Cuff), sitting);  sCr: 0.95 mg/dL;  GFR: 76.79        Repeat:     9:19:40 AM     BP:   144/90mm Hg (left arm, sitting)         Exams:     PHYSICAL EXAM:     GENERAL: vital signs recorded - well developed, well nourished, obese;  no apparent distress;     NECK: range of motion is normal;     RESPIRATORY: normal appearance and symmetric expansion of chest wall; normal respiratory rate and pattern with no distress; normal breath sounds with no rales, rhonchi, wheezes or rubs;     CARDIOVASCULAR: normal rate; rhythm is regular;  no edema;     GASTROINTESTINAL: nontender; normal bowel sounds; no organomegaly;     LYMPHATIC: no enlargement of cervical or facial nodes; no supraclavicular nodes;     MUSCULOSKELETAL: normal gait; normal range of motion of all major muscle groups; no limb or joint pain with range of motion;     NEUROLOGIC: mental status: alert and oriented x 3;     PSYCHIATRIC: appropriate affect and demeanor; normal speech pattern; normal thought and perception;         ASSESSMENT           250.00   E11.9  Type 2 diabetes              DDx:     272.4   E78.5  Hyperlipidemia              DDx:     530.81   R12  GERD              DDx:     724.2   M54.5  Lower back pain              DDx:     296.20   F34.1  Depression              DDx:     705.81   L30.1  Dyshidrosis              DDx:     272.4   E78.5  Hyperlipidemia              DDx:         ORDERS:         Meds Prescribed:       Janumet (Sitagliptin/Metformin HCl) 50mg/500mg Tablet 1 po Qday  #30 (Thirty) tablet(s) Refills: 3       Lisinopril 5mg Tablet 1 tab daily  #30 (Thirty) tablet(s) Refills: 3       Clindamycin Phosphate 1% Topical Gel Apply in the monring and night  #30 (Thirty) gm Refills: 1       Refill of: Crestor (Rosuvastatin) 10mg Tablet Take 1 tablet(s) by mouth daily  at bedtime  #90 (Ninety) tablet(s)  Refills: 1         Lab Orders:       [id number]  BDUA - [facility] Urinalysis, automated, with micro  (Send-Out)         [id number]  LPDP - [facility] Lipid Panel  (Send-Out)         [id number]  DIAB - [facility] LIPID,CMP, A1C: 42084, 40636, 82379  (Send-Out)                   PLAN:          Type 2 diabetes     LABORATORY:  Labs ordered to be performed today include Diabetes Panel 1; CMP, Lipid, A1C.      RECOMMENDATIONS given include: instructed to stop the metformin and we will see if the janumet is approved and give this medication a try - as she is unable to tolerate the metformin at the higher dose  I will also put Caitlin on a very low dose of lisinopril for renal protectant.            Prescriptions:       Janumet (Sitagliptin/Metformin HCl) 50mg/500mg Tablet 1 po Qday  #30 (Thirty) tablet(s) Refills: 3       Lisinopril 5mg Tablet 1 tab daily  #30 (Thirty) tablet(s) Refills: 3           Orders:       [id number]  DIAB - [facility] LIPID,CMP, A1C: 75248, 40260, 18310  (Send-Out)            Hyperlipidemia as above          GERD         RECOMMENDATIONS given include: continue currentl dose of omeprazole, try to reduce to every other day.      FOLLOW-UP: Schedule follow-up appointments on a p.r.n. basis..           Lower back pain     LABORATORY:  Labs ordered to be performed today include urinalysis with micro.            Orders:       [id number]  [facility], with micro  (Send-Out)            Depression         RECOMMENDATIONS given include: resume sertraline at 1/2 tab x 1 week then increase to 1 tab as previous  RTO if continuing symptoms.           Dyshidrosis         RECOMMENDATIONS given include: do not want to increase the minocycline, I will give a topical antibiotic for treatment as needed- if continues may need to follow up with derm.            Prescriptions:       Clindamycin Phosphate 1% Topical Gel Apply in the monring and night  #30 (Thirty) gm Refills: 1          Hyperlipidemia taking every other day     LABORATORY:  Labs  ordered to be performed today include lipid panel.      RECOMMENDATIONS given include: Currently taking every other day - we will see what the labs show to see if this is sufficient to keep her labs down, if not, I do recommend that she resume daily administration.            Prescriptions:       Refill of: Crestor (Rosuvastatin) 10mg Tablet Take 1 tablet(s) by mouth daily  at bedtime  #90 (Ninety) tablet(s) Refills: 1           Orders:       [id number]  [facility] Lipid Panel  (Send-Out)               Patient Recommendations:        For  Type 2 diabetes:     I also recommend instructed to stop the metformin and we will see if the janumet is approved and give this medication a try - as she is unable to tolerate the metformin at the higher dose  I will also put Caitlin on a very low dose of lisinopril for renal protectant.          For  GERD:     I also recommend continue currentl dose of omeprazole, try to reduce to every other day.  Schedule follow-up appointments as needed.                APPOINTMENT INFORMATION:        Monday Tuesday Wednesday Thursday Friday Saturday Sunday            Time:___________________AM  PM   Date:_____________________         For  Depression:     I also recommend resume sertraline at 1/2 tab x 1 week then increase to 1 tab as previous  RTO if continuing symptoms.          For  Dyshidrosis:     I also recommend do not want to increase the minocycline, I will give a topical antibiotic for treatment as needed- if continues may need to follow up with derm.          For  Hyperlipidemia:     I also recommend Currently taking every other day - we will see what the labs show to see if this is sufficient to keep her labs down, if not, I do recommend that she resume daily administration.              CHARGE CAPTURE           **Please note: ICD descriptions below are intended for billing purposes only and may not represent clinical diagnoses**        Primary Diagnosis:         250.00 Type 2  diabetes            E11.9    Type 2 diabetes mellitus without complications              Orders:          [id number]   Office/outpatient visit; established patient, level 4  (In-House)           272.4 Hyperlipidemia            E78.5    Hyperlipidemia, unspecified    530.81 GERD            R12    Heartburn    724.2 Lower back pain            M54.5    Low back pain    296.20 Depression            F34.1    Dysthymic disorder    705.81 Dyshidrosis            L30.1    Dyshidrosis [pompholyx]    272.4 Hyperlipidemia            E78.5    Hyperlipidemia, unspecified

## 2021-05-18 NOTE — PROGRESS NOTES
Caitlin Escalera  1970     Office/Outpatient Visit    Visit Date: Mon, Dec 2, 2019 02:02 pm    Provider: Sylvia Ervin N.P. (Assistant: Mckayla Schwab MA)    Location: [facility]        Electronically signed by Sylvia Ervin N.P. on  12/03/2019 10:13:05 AM                             Subjective:        CC: Ms. Escalera is a 49 year old White female.  low back pain;         HPI:           Patient presents with dysthymic disorder.  This is a routine follow-up.  Caitlin reports that she does not take her medication as prescribed.  She takes 'when I start really feeling bad'.  She takes care of her grandchild much of the time while her son works a third shift job.  Her  is having some problems with his shoulder           Additionally, she presents with history of low back pain.  the location is primarily in the lumbar spine.  She characterizes it as intermittent, moderate in intensity, and aching.  This is an acute episode with no prior history of back pain.  She states that the current episode of pain started 2 weeks ago.  The event which precipitated this pain was seems to be worse in the morning or when she has been sitting for a period of time.  This occurred at home.  She denies any associated symptoms.  Medical history is significant for Caitlin has a solitary kidney and is concerned that this is from her kidney.            Concerning type 2 diabetes mellitus without complications, compliance with treatment has been fair; she skips some medication doses due to side effects.  Patient's diabetes was first diagnosed several years ago.  Typical diet includes low carbohydrate, vegetables, and occasional slip ups in diet.  Current meds include an oral hypoglycemic ( Glucophage XR ( 500mg bid ) ).  Most recent lab results include Estimated Avg Glucose:  143 (mg/dL) (12/15/2017),  120 (mg/dL) (02/23/2018),  126 (mg/dL) (06/07/2019), Hemoglobin A1c:  6.6 (%) (12/15/2017),  5.8 (%)  (02/23/2018),  6.0 (%) (06/07/2019).  Blood pressure has been and well controlled.  Has not fallen recently     Physical Activity: ** Exercises infrequently;     ROS:     CONSTITUTIONAL:  Negative for chills, fatigue and fever.      CARDIOVASCULAR:  Negative for chest pain and pedal edema.      RESPIRATORY:  Negative for recent cough and dyspnea.      GENITOURINARY:  Negative for dysuria, hematuria and frequent urination.      MUSCULOSKELETAL:  Positive for back pain ( acute ).   Negative for arthralgias or limb pain.      NEUROLOGICAL:  Negative for paresthesias.      ALLERGIC/IMMUNOLOGIC:  Negative for seasonal allergies.      PSYCHIATRIC:  Positive for eli depression ( (OK when takes medicaiton) ) and feelings of stress.   Negative for anxiety, sleep disturbance or suicidal thoughts.          Past Medical History / Family History / Social History:         Last Reviewed on 12/21/2018 11:17 AM by Sylvia Ervin    Past Medical History:                 PAST MEDICAL HISTORY         solitary working  left kidney         PREVENTIVE HEALTH MAINTENANCE             EYE EXAM: was last done been a while     MAMMOGRAM: was last done 2016 with normal results     PAP SMEAR: was last done 9/2016 with normal results         Surgical History:         Cholecystectomy     Hysterectomy: 2014;     Bilateral Tubal Ligation         Family History:         Positive for Type 2 Diabetes ( father ).  Father: Congestive Heart Failure;  Type 2 Diabetes     Mother: Hypothyroidism     Brother(s): mantel cell cancer         Social History:     Occupation: Disabled (due to shoulder surgery)     Marital Status:      Children: 3 children         Tobacco/Alcohol/Supplements:     Last Reviewed on 12/21/2018 11:17 AM by Sylvia Ervin    Tobacco: Current Smoker: She currently smokes every day, 1/2 to 1 pack per day.          Supplements:  Patient admits to use of multivitamin.          Substance Abuse History:     Last Reviewed on  12/21/2018 11:17 AM by Sylvia Ervin    None         Mental Health History:     Last Reviewed on 12/21/2018 11:17 AM by Sylvia Ervin        depression         Communicable Diseases (eg STDs):     Last Reviewed on 12/21/2018 11:17 AM by Sylvia Ervin    Reportable health conditions; NEGATIVE         Current Problems:     Last Reviewed on 12/03/2019 10:09 AM by Sylvia Ervin    Heartburn    Overweight    Hyperlipidemia, unspecified    Dysthymic disorder    Low back pain    Type 2 diabetes mellitus without complications    Dyshidrosis [pompholyx]    Proteinuria, unspecified    Encounter for screening for cardiovascular disorders    Solitary Kidney(Renal agenesis), unilateral        Immunizations:     Td adult 4/7/2011    Hep B (adult dose) 4/7/2011    Hep B (adult dose) 5/17/2011    Hep B (adult dose) 10/22/2012    MMR  (Measles-Mumps-Rubella), live 4/7/2011    Fluzone (3 + years dose) 9/1/2017        Allergies:     Last Reviewed on 6/07/2019 08:43 AM by Eri Silverio    Demerol HCl:      Morphine: shortness of breath         Current Medications:     Last Reviewed on 6/07/2019 08:45 AM by Eri Silverio    metFORMIN 500 mg oral Tablet, Extended Release 24 hr [1 tab bid]    omeprazole 20 mg oral capsule,delayed release (enteric coated) [TAKE 1 CAPSULE BY MOUTH ONCE DAILY]    sertraline 100 mg oral tablet [1 and 1/2 tablets daily]    Lancet   Lancet [Check blood once daily as directed]    candesartan 16 mg oral tablet [Take 1 tablet(s) by mouth daily]    atorvastatin 40 mg oral tablet [1 tab daily]        Objective:        Vitals:         Historical:     6/7/2019  Wt:   183.4lbs    Current: 12/2/2019 2:08:05 PM    Ht:  5 ft, 2.25 in;  Wt: 179.2 lbs;  BMI: 32.5T: 98.5 F (oral);  BP: 127/64 mm Hg (left arm, sitting);  P: 88 bpm (left arm (BP Cuff), sitting);  sCr: 0.86 mg/dL;  GFR: 83.67        Exams:     PHYSICAL EXAM:     GENERAL: vital signs recorded - well developed, well nourished;  no apparent  distress;     NECK: range of motion is normal;     RESPIRATORY: normal appearance and symmetric expansion of chest wall; normal respiratory rate and pattern with no distress; normal breath sounds with no rales, rhonchi, wheezes or rubs;     CARDIOVASCULAR: normal rate; rhythm is regular;  no edema;     MUSCULOSKELETAL: normal gait; normal range of motion of all major muscle groups; pain with range of motion in: back lateral flexion;     NEUROLOGIC: mental status: alert and oriented x 3;     PSYCHIATRIC: appropriate affect and demeanor; normal speech pattern; normal thought and perception;         Lab/Test Results:         Glucose, Urine: Neg (12/02/2019),     Bilirubin, urine: Negative (12/02/2019),     Ketones, Urine Strip: Negative (12/02/2019),     Specific Gravity, urine: 1.015 (12/02/2019),     Blood in Urine: trace (12/02/2019),     pH, urine: 5.5 (12/02/2019),     Protein Urine QL: 100 mg (12/02/2019),     Urobilinogen, urine: 0.2 E.U./dL (12/02/2019),     Nitrite, Urine: Negative (12/02/2019),     Leukoctyes, urine: Negative (12/02/2019),     Appearance: Clear (12/02/2019),     collection source: Clean-catch (12/02/2019),     Color: Yellow (12/02/2019),     Performed by:: al (12/02/2019),             Assessment:         F34.1   Dysthymic disorder       M54.5   Low back pain       E11.9   Type 2 diabetes mellitus without complications       Z13.6   Encounter for screening for cardiovascular disorders       R80.9   Proteinuria, unspecified           ORDERS:         Meds Prescribed:       [Refilled] candesartan 16 mg oral tablet [Take 1 tablet(s) by mouth daily], #30 (thirty) tablets, Refills: 5 (five)       [Refilled] sertraline 100 mg oral tablet [1 and 1/2 tablets daily], #135 (one hundred and thirty five) tablets, Refills: 5 (five)       [Refilled] metFORMIN 500 mg oral Tablet, Extended Release 24 hr [1 tab bid], #180 (one hundred and eighty) tablets, Refills: 1 (one)       [New Rx] baclofen 5 mg oral  tablet [take 1 tablet (5 mg) by oral route HS PRN], #20 (twenty) tablets, Refills: 0 (zero)         Lab Orders:       [id number]  Urinalysis, automated, without microscopy  (In-House)            82528  URCU - [facility] Urine Culture  (Send-Out)            34346  DIAB1 - [facility] LIPID,CMP, A1C: 45270, 10406, 64284  (Send-Out)            76045  BDCB2 - [facility] CBC w/o diff  (Send-Out)                      Plan:         Dysthymic disorder          Prescriptions:       [Refilled] sertraline 100 mg oral tablet [1 and 1/2 tablets daily], #135 (one hundred and thirty five) tablets, Refills: 5 (five)         Low back painThis does not appear to be kidney related- appears to be a strain to her lower back.  I would recommend that we try a muscle relaxer, topical Capsaisin/biofreeze OTC and consider PT if no improvement.  We may, at some point, need an xray of her low back if this continues          Prescriptions:       [New Rx] baclofen 5 mg oral tablet [take 1 tablet (5 mg) by oral route HS PRN], #20 (twenty) tablets, Refills: 0 (zero)           Orders:       [id number]  Urinalysis, automated, without microscopy  (In-House)            27191  URCU - [facility] Urine Culture  (Send-Out)              Type 2 diabetes mellitus without complications    LABORATORY:  Labs ordered to be performed today include Diabetes Panel 1; CMP, Lipid, A1C.            Prescriptions:       [Refilled] candesartan 16 mg oral tablet [Take 1 tablet(s) by mouth daily], #30 (thirty) tablets, Refills: 5 (five)       [Refilled] metFORMIN 500 mg oral Tablet, Extended Release 24 hr [1 tab bid], #180 (one hundred and eighty) tablets, Refills: 1 (one)           Orders:       [id number]  DIAB1 - [facility] LIPID,CMP, A1C: 61984, 00060, 84599  (Send-Out)              Encounter for screening for cardiovascular disorders    LABORATORY:  Labs ordered to be performed today include CBC W/O DIFF.            Orders:       [id number]  BDCB2 - [facility] CBC w/o diff  (Send-Out)              Proteinuria, unspecifiedbaseline  for Caitlin - will have her follow up with Nephrology as recommended            Charge Capture:         Primary Diagnosis:     F34.1  Dysthymic disorder           Orders:      [id number]  Office/outpatient visit; established patient, level 4  (In-House)              M54.5  Low back pain           Orders:      [id number]  Urinalysis, automated, without microscopy  (In-House)              E11.9  Type 2 diabetes mellitus without complications     Z13.6  Encounter for screening for cardiovascular disorders     R80.9  Proteinuria, unspecified

## 2021-05-18 NOTE — PROGRESS NOTES
Caitlin Escalera  1970     Office/Outpatient Visit    Visit Date: Wed, Sep 16, 2020 04:34 pm    Provider: Sylvia Ervin N.P. (Assistant: Glendy Wilson LPN)    Location: [facility]        Electronically signed by Sylvia Evrin N.P. on  09/19/2020 10:23:40 AM                             Subjective:        CC: Ms. Escalera is a 50 year old White female.  This is a follow-up visit.          HPI:           PHQ-9 Depression Screening: Completed form scanned and in chart; Total Score 3           With regard to the hyperlipidemia, unspecified, compliance with treatment has been good.  She specifically denies associated symptoms, including muscle pain and weakness.            Dysthymic disorder details; this is a routine follow-up.  Has been off medication for some time - feels overwhelmed, stressed           Additionally, she presents with history of type 2 diabetes mellitus without complications.  specifically, this is type 2, non-insulin requiring diabetes.  Patient's diabetes was first diagnosed several years ago.  She reports home blood glucose readings have averaged fasting readings in the when has not been taking metformin, was up to 180s mg/dL range.  Most recent lab results include Estimated Avg Glucose:  137 (mg/dL) (12/21/2018),  126 (mg/dL) (06/07/2019),  148 (mg/dL) (12/02/2019), Hemoglobin A1c:  6.4 (%) (12/21/2018),  6.0 (%) (06/07/2019),  6.8 (%) (12/02/2019).            Complaint of solitary Kidney(Renal agenesis), unilateral..  Caitlin has one kidney, under the care of Dr. Starr annual follow ups           Complaint of hidradenitis suppurativa..  currently having flare up under breasts  was previously on minocycline and clindamycin gel  she is out  needs refills     ROS:     CONSTITUTIONAL:  Positive for fatigue.   Negative for chills or fever.      CARDIOVASCULAR:  Negative for chest pain and pedal edema.      RESPIRATORY:  Negative for recent cough and dyspnea.       GASTROINTESTINAL:  Negative for abdominal pain, constipation, diarrhea, heartburn, nausea and vomiting.      GENITOURINARY:  Negative for dysuria and change in urine stream.      MUSCULOSKELETAL:  Negative for arthralgias and myalgias.      INTEGUMENTARY/BREAST:  Positive for rash, skin changes of breast and HS.   Negative for pruritis.      ALLERGIC/IMMUNOLOGIC:  Negative for seasonal allergies.      PSYCHIATRIC:  Positive for anxiety, depression, feelings of stress and mood swings.   Negative for sleep disturbance or suicidal thoughts.          Past Medical History / Family History / Social History:         Last Reviewed on 12/21/2018 11:17 AM by Sylvia Ervin    Past Medical History:                 PAST MEDICAL HISTORY         solitary working  left kidney         PREVENTIVE HEALTH MAINTENANCE             EYE EXAM: was last done been a while     MAMMOGRAM: was last done 2016 with normal results     PAP SMEAR: was last done 9/2016 with normal results         Surgical History:         Cholecystectomy     Hysterectomy: 2014;     Bilateral Tubal Ligation         Family History:         Positive for Type 2 Diabetes ( father ).  Father: Congestive Heart Failure;  Type 2 Diabetes     Mother: Hypothyroidism     Brother(s): mantel cell cancer         Social History:     Occupation: Disabled (due to shoulder surgery)     Marital Status:      Children: 3 children         Tobacco/Alcohol/Supplements:     Last Reviewed on 9/16/2020 04:35 PM by Glendy Wilson    Tobacco: Current Smoker: She currently smokes every day, 1/2 to 1 pack per day.          Supplements:  Patient admits to use of multivitamin.          Substance Abuse History:     Last Reviewed on 12/21/2018 11:17 AM by Sylvia Ervin    None         Mental Health History:     Last Reviewed on 12/21/2018 11:17 AM by Sylvia Ervin        depression         Communicable Diseases (eg STDs):     Last Reviewed on 12/21/2018 11:17 AM by Arcadio  Sylvia EPPS    Reportable health conditions; NEGATIVE         Current Problems:     Last Reviewed on 12/03/2019 10:09 AM by Sylvia Ervin    Heartburn    Overweight    Hyperlipidemia, unspecified    Dysthymic disorder    Low back pain    Type 2 diabetes mellitus without complications    Dyshidrosis [pompholyx]    Proteinuria, unspecified    Solitary Kidney(Renal agenesis), unilateral    Encounter for screening for cardiovascular disorders    Encounter for other screening for malignant neoplasm of breast    Encounter for screening for depression    Personal history of other diseases of urinary system    Encounter for screening mammogram for malignant neoplasm of breast    Hidradenitis suppurativa        Immunizations:     Td adult 4/7/2011    Hep B (adult dose) 4/7/2011    Hep B (adult dose) 5/17/2011    Hep B (adult dose) 10/22/2012    MMR  (Measles-Mumps-Rubella), live 4/7/2011    Fluzone (3 + years dose) 9/1/2017        Allergies:     Last Reviewed on 9/16/2020 04:35 PM by Glendy Wilson    Demerol HCl:      Morphine: shortness of breath         Current Medications:     Last Reviewed on 9/16/2020 04:35 PM by Glendy Wilson    omeprazole 20 mg oral capsule,delayed release (enteric coated) [TAKE 1 CAPSULE BY MOUTH ONCE DAILY]    sertraline 100 mg oral tablet [1 and 1/2 tablets daily]    omeprazole 20 mg oral capsule,delayed release (enteric coated) [Take 1 capsule by mouth once daily]    Lancet   Lancet [Check blood once daily as directed]    candesartan 16 mg oral tablet [Take 1 tablet(s) by mouth daily]    atorvastatin 40 mg oral tablet [1 tab daily]    baclofen 5 mg oral tablet [take 1 tablet (5 mg) by oral route HS PRN]    Minocycline HCl 100mg Capsules [Take 1 capsule(s) by mouth BID]    Clindamycin Phosphate 1% Topical Gel [Apply in the monring and night]        Objective:        Vitals:         Current: 9/16/2020 4:38:54 PM    Ht:  5 ft, 2.25 in;  Wt: 188.6 lbs;  BMI: 34.2T: 97.8 F (oral);   BP: 133/65 mm Hg (left arm, sitting);  P: 92 bpm (left arm (BP Cuff), sitting);  sCr: 0.93 mg/dL;  GFR: 78.24        Exams:     PHYSICAL EXAM:     GENERAL: vital signs recorded - well developed, well nourished;  no apparent distress, tearful;     NECK: range of motion is normal;     RESPIRATORY: normal appearance and symmetric expansion of chest wall; normal respiratory rate and pattern with no distress; normal breath sounds with no rales, rhonchi, wheezes or rubs;     CARDIOVASCULAR: normal rate; rhythm is regular;  no edema;     GASTROINTESTINAL: nontender; normal bowel sounds; no organomegaly;     LYMPHATIC: no enlargement of cervical or facial nodes; no supraclavicular nodes;     MUSCULOSKELETAL: normal gait; normal range of motion of all major muscle groups; no limb or joint pain with range of motion;     NEUROLOGIC: mental status: alert and oriented x 3;     PSYCHIATRIC: affect/demeanor: depressed, tearful;  normal speech pattern; normal thought and perception;         Assessment:         Z13.31   Encounter for screening for depression       E78.5   Hyperlipidemia, unspecified       F34.1   Dysthymic disorder       E11.9   Type 2 diabetes mellitus without complications       Q60.0   Solitary Kidney(Renal agenesis), unilateral       Z13.6   Encounter for screening for cardiovascular disorders       Z12.31   Encounter for screening mammogram for malignant neoplasm of breast       L73.2   Hidradenitis suppurativa       Z87.448   Personal history of other diseases of urinary system           ORDERS:         Meds Prescribed:       [Refilled] minocycline 100 mg oral capsule [Take 1 capsule(s) by mouth BID], #180 (one hundred and eighty) capsules, Refills: 1 (one)       [Refilled] Clindamycin Phosphate 1 % Topical Gel [Apply in the monring and night], #30 (thirty) grams, Refills: 1 (one)       [Refilled] candesartan 16 mg oral tablet [Take 1 tablet(s) by mouth daily], #30 (thirty) tablets, Refills: 5 (five)        [Refilled] atorvastatin 40 mg oral tablet [1 tab daily], #90 (ninety) tablets, Refills: 1 (one)       [Refilled] omeprazole 20 mg oral capsule,delayed release (enteric coated) [Take 1 capsule by mouth once daily], #30 (thirty) capsules, Refills: 5 (five)       [New Rx] venlafaxine 37.5 mg oral Tablet, Extended Release 24 hr [take 1 tablet (37.5 mg) by oral route once daily in the evening at the same time each day with food], #30 (thirty) tablets, Refills: 1 (one)       [New Rx] Trulicity 0.75 mg/0.5 mL subcutaneous Pen Injector [inject 0.5 milliliter (0.75 mg) by subcutaneous route every 7 days inthe abdomen, thigh, or upper arm rotating injection sites], #4 (four) each, Refills: 2 (two)         Radiology/Test Orders:       [id number]  Screening mammography, bilateral (2-view film study of each breast)  (Send-Out)              Lab Orders:       [id number]  [facility] PHYSICAL: CMP, CBC, TSH, LIPID: [id number], [id number]  (Send-Out)            [id number]  ([facility])              Other Orders:         Depression screen negative  (In-House)                      Plan:         Encounter for screening for depression    MIPS Negative Depression Screen           Orders:         Depression screen negative  (In-House)              Hyperlipidemia, unspecified          Prescriptions:       [Refilled] atorvastatin 40 mg oral tablet [1 tab daily], #90 (ninety) tablets, Refills: 1 (one)         Dysthymic disorder          Prescriptions:       [New Rx] venlafaxine 37.5 mg oral Tablet, Extended Release 24 hr [take 1 tablet (37.5 mg) by oral route once daily in the evening at the same time each day with food], #30 (thirty) tablets, Refills: 1 (one)         Type 2 diabetes mellitus without complications    LABORATORY:  Labs ordered to be performed today include HgbA1C.            Prescriptions:       [New Rx] Trulicity 0.75 mg/0.5 mL subcutaneous Pen Injector [inject 0.5 milliliter (0.75 mg) by  subcutaneous route every 7 days inthe abdomen, thigh, or upper arm rotating injection sites], #4 (four) each, Refills: 2 (two)           [facility]:       [id number]  [facility]  ([facility]              Solitary Kidney(Renal agenesis), unilateralfollow up with Nephrology as recommended        Encounter for screening for cardiovascular disorders    LABORATORY:  Labs ordered to be performed today include PHYSICAL PANEL; CMP, CBC, TSH, LIPID.            Orders:       [id number]  [facility] PHYSICAL: CMP, CBC, TSH, LIPID: [id number], [id number]  (Send-Out)              Encounter for screening mammogram for malignant neoplasm of breast        FOLLOW-UP TESTING #1:    RADIOLOGY:  I have ordered Mammogram Screening to be done today.            Orders:       [id number]  Screening mammography, bilateral (2-view film study of each breast)  (Send-Out)              Hidradenitis suppurativa          Prescriptions:       [Refilled] minocycline 100 mg oral capsule [Take 1 capsule(s) by mouth BID], #180 (one hundred and eighty) capsules, Refills: 1 (one)       [Refilled] Clindamycin Phosphate 1 % Topical Gel [Apply in the monring and night], #30 (thirty) grams, Refills: 1 (one)         [facility]          Prescriptions:       [Refilled] candesartan 16 mg oral tablet [Take 1 tablet(s) by mouth daily], #30 (thirty) tablets, Refills: 5 (five)             Other Prescriptions:       [Refilled] omeprazole 20 mg oral capsule,delayed release (enteric coated) [Take 1 capsule by mouth once daily], #30 (thirty) capsules, Refills: 5 (five)         Charge Capture:         Primary Diagnosis:     Z13.31  Encounter for screening for depression           Orders:      [id number]  Office/outpatient visit; established patient, level 4  (In-House)              Depression screen negative  (In-House)              E78.5  Hyperlipidemia, unspecified     F34.1  Dysthymic disorder     E11.9  Type 2  diabetes mellitus without complications     Q60.0  Solitary Kidney(Renal agenesis), unilateral     Z13.6  Encounter for screening for cardiovascular disorders     Z12.31  Encounter for screening mammogram for malignant neoplasm of breast     L73.2  Hidradenitis suppurativa     Z87.448  Personal history of other diseases of urinary system

## 2021-05-18 NOTE — PROGRESS NOTES
Caitlin Escalera  1970     Office/Outpatient Visit    Visit Date: Mon, Jan 11, 2021 10:53 am    Provider: Sylvia Ervin N.P. (Assistant: Kami Ziegler MA)    Location: [facility]        Electronically signed by Sylvia Ervin N.P. on  01/14/2021 01:59:28 PM                             Subjective:        CC: Ms. Escalera is a 50 year old White female.  This is a follow-up visit.          HPI:           Patient presents with type 2 diabetes mellitus without complications.  Specifically, this is type 2, non-insulin requiring diabetes.  She reports home blood glucose readings have been excellent, with average fasting glucoses running <120 mg/dL. have averaged fasting readings in the has been as high as 218  but rarely mg/dL range.  She reports that she does not take the medication as recommended.  Sometimes will take it 2 times per day, sometimes 4 times.  She does have some diarrhea with the medication.  She does not follow up as recommended           In regard to the dysthymic disorder, visit today is because of worsening symptoms.  The diagnosis of depression was made several years ago.  Presently, she feels a moderate degree of depression.  This episode of depression has been present for the past several months.  Current medications include Effexor.  Current affective symptoms include anxious mood, decreased ability to concentrate and loss of libido.  The symptoms are constant and overwhelming.            Complaint of hidradenitis suppurativa..  The symptom began years ago.  currently taking occasiona minocycline for her HS  Feels like it does well most times - her blood sugar will affect how well it is controlled           Additionally, she presents with history of mixed hyperlipidemia.  concurrent health problems include diabetes and hypertension.            Encounter for general adult medical examination without abnormal findings details; her last physical exam was 2 years ago.  She  is status-post hysterectomy.  She is not currently using any form of contraception.   Her last mammogram was <1 year ago.   She has never had a dexa scan. She's had vision screening done __ years ago (enter) years ago.   Preventative Health updated today.  She is not current with her influenza immunization.            Complaint of solitary Kidney(Renal agenesis), unilateral..  Follows up with Nephrology annually  has not been in some time.  will need appt as last has been canceled due to covid.  She has attempted to get in touch with that office and has been unable to reach anyone - not able to leave a message           With regard to the low back pain, the discomfort is most prominent in the mid lumbar spine.  This radiates to the perineum and right posterior thigh.  She characterizes it as intermittent, moderate in intensity, dull, and pressing.  This is a chronic, but intermittent problem with an acute exacerbation.  She states that the current episode of pain started off and on for years  but worse over the past 1-2 months months ago.  She does not recall any precipitating event or injury.  She notes some pain relief with back extension / lying down.  The pain worsens with back flexion.            Complaint of other specified menopausal and perimenopausal disorders..  Caitlin has been having increasing post menopausal symptoms including hot flashes, vaginal dryness, fatigue, difficulty in losing weight. mood swings  She is interested in treatment available.  She did have a hysterectomy around 15-20 yrs ago but still with ovaries in tact.  She has tried OTC treatments including evening primrose, black cohosh and nothing seems to help She has tried coconut oil for her vaginal dryness, she has tried lubricant, but feels like this is interfering with her marriage  and causing tension between her and her     ROS:     CONSTITUTIONAL:  Positive for fatigue, night sweats and unintentional weight gain.   Negative  for chills or fever.      EYES:  Negative for blurred vision.      E/N/T:  Negative for ear pain and tinnitus.      CARDIOVASCULAR:  Negative for chest pain and pedal edema.      RESPIRATORY:  Negative for recent cough and dyspnea.      GASTROINTESTINAL:  Positive for diarrhea ( sometimes from the metformin ).   Negative for abdominal pain, constipation, heartburn, nausea or vomiting.      GENITOURINARY:  Positive for dyspareunia and (related to vaginal dryness) vaginal dryness.   Negative for dysuria or change in urine stream.      MUSCULOSKELETAL:  Positive for back pain ( recurrent ).   Negative for arthralgias or myalgias.      INTEGUMENTARY/BREAST:  Negative for pruritis and rash.      NEUROLOGICAL:  Positive for paresthesia ( occasionally going down right leg ).   Negative for dizziness, fainting or headaches.      ENDOCRINE:  Positive for hot flashes.   Negative for hair loss, polydipsia or polyphagia.      ALLERGIC/IMMUNOLOGIC:  Negative for seasonal allergies.      PSYCHIATRIC:  Positive for anxiety, crying spells, depression, feelings of stress, anhedonia, mood swings, difficulty concentrating, sleep disturbance and loss of lobido.   Negative for suicidal thoughts.          Past Medical History / Family History / Social History:         Last Reviewed on 1/11/2021 11:38 AM by Sylvia Ervin    Past Medical History:                 PAST MEDICAL HISTORY         solitary working  left kidney         PREVENTIVE HEALTH MAINTENANCE             COLORECTAL CANCER SCREENING:     DENTAL CLEANING: was last done 2020     MAMMOGRAM: was last done 2020 with normal results     PAP SMEAR: was last done 9/2016 with normal results         Surgical History:         Cholecystectomy     Hysterectomy: at age in 30s;     Bilateral Tubal Ligation         Family History:         Positive for Type 2 Diabetes ( father ).  Father: Congestive Heart Failure;  Type 2 Diabetes     Mother: Hypothyroidism     Brother(s): mantel cell  cancer         Social History:     Occupation: Disabled (due to shoulder surgery)     Marital Status:      Children: 3 children         Tobacco/Alcohol/Supplements:     Last Reviewed on 1/11/2021 11:38 AM by Sylvia Ervin    Tobacco: Current Smoker: She currently smokes every day, 1/2 to 1 pack per day.          Supplements:  Patient admits to use of multivitamin.          Substance Abuse History:     Last Reviewed on 1/11/2021 11:38 AM by Sylvia Ervin    None         Mental Health History:     Last Reviewed on 1/11/2021 11:38 AM by Sylvia Ervin        depression         Communicable Diseases (eg STDs):     Last Reviewed on 1/11/2021 11:38 AM by Sylvia Ervin    Reportable health conditions; NEGATIVE         Current Problems:     Last Reviewed on 1/11/2021 11:38 AM by Sylvia Ervin    Heartburn    Overweight    Hyperlipidemia, unspecified    Dysthymic disorder    Type 2 diabetes mellitus without complications    Low back pain    Dyshidrosis [pompholyx]    Proteinuria, unspecified    Solitary Kidney(Renal agenesis), unilateral    Encounter for screening for cardiovascular disorders    Encounter for other screening for malignant neoplasm of breast    Encounter for screening for depression    Personal history of other diseases of urinary system    Encounter for screening mammogram for malignant neoplasm of breast    Hidradenitis suppurativa    Elevated white blood cell count, unspecified    Mixed hyperlipidemia    Nicotine dependence, unspecified, uncomplicated    Encounter for general adult medical examination without abnormal findings    Cyst of kidney, acquired    Other specified menopausal and perimenopausal disorders        Immunizations:     Td adult 4/7/2011    Hep B (adult dose) 4/7/2011    Hep B (adult dose) 5/17/2011    Hep B (adult dose) 10/22/2012    MMR  (Measles-Mumps-Rubella), live 4/7/2011    Fluzone (3 + years dose) 9/1/2017        Allergies:     Last Reviewed on 1/11/2021  11:38 AM by Sylvia Ervin    Demerol HCl:      Trulicity: Nausea  (Adverse Reaction)    Morphine: shortness of breath         Current Medications:     Last Reviewed on 1/11/2021 11:38 AM by Sylvia Ervin    minocycline 100 mg oral capsule [Take 1 capsule(s) by mouth BID]    omeprazole 20 mg oral capsule,delayed release (enteric coated) [Take 1 capsule by mouth once daily]    Lancet   Lancet [Check blood once daily as directed]    Clindamycin Phosphate 1 % Topical Gel [Apply in the monring and night]    candesartan 16 mg oral tablet [Take 1 tablet(s) by mouth daily]    atorvastatin 40 mg oral tablet [1 tab daily]    venlafaxine 37.5 mg oral Tablet, Extended Release 24 hr [take 1 tablet (37.5 mg) by oral route once daily in the evening at the same time each day with food]    metFORMIN 500 mg oral tablet [TAKE 2 TABLETS BY MOUTH TWICE DAILY WITH MORNING MEAL AND WITH EVENING MEAL]        Objective:        Vitals:         Historical:     9/16/2020  Wt:   188.6lbs12/2/2019  Wt:   179.2lbs    Current: 1/11/2021 11:02:16 AM    Ht:  5 ft, 2.25 in;  Wt: 188.2 lbs;  BMI: 34.1T: 97.2 F (oral);  BP: 130/86 mm Hg (left arm, sitting);  sCr: 0.85 mg/dL;  GFR: 85.53        Exams:     PHYSICAL EXAM:     GENERAL: vital signs recorded - well developed, well nourished, obese;  no apparent distress;     EYES: extraocular movements intact; PERRL;     E/N/T: EARS: external auditory canal normal;  bilateral TMs are normal;  NOSE:  normal nasal mucosa, septum, turbinates, and sinuses; OROPHARYNX:  normal mucosa, dentition, gingiva, and posterior pharynx;     NECK: range of motion is normal;     RESPIRATORY: normal appearance and symmetric expansion of chest wall; normal respiratory rate and pattern with no distress; normal breath sounds with no rales, rhonchi, wheezes or rubs;     CARDIOVASCULAR: normal rate; rhythm is regular;  no edema;     GASTROINTESTINAL: nontender; normal bowel sounds; no organomegaly;     MUSCULOSKELETAL:  normal gait; normal range of motion of all major muscle groups; pain with range of motion in: back flexion and left lateral flexion;     NEUROLOGIC: mental status: alert and oriented x 3;     PSYCHIATRIC: appropriate affect and demeanor; normal speech pattern; normal thought and perception;         Assessment:         Z00.00   Encounter for general adult medical examination without abnormal findings       E11.9   Type 2 diabetes mellitus without complications       F34.1   Dysthymic disorder       L73.2   Hidradenitis suppurativa       E78.2   Mixed hyperlipidemia       F17.200   Nicotine dependence, unspecified, uncomplicated       Q60.0   Solitary Kidney(Renal agenesis), unilateral       M54.5   Low back pain       N95.8   Other specified menopausal and perimenopausal disorders       N28.1   Cyst of kidney, acquired       I10   Essential (primary) hypertension           ORDERS:         Meds Prescribed:       [Refilled] venlafaxine 37.5 mg oral Tablet, Extended Release 24 hr [take 1 tablet (37.5 mg) by oral route once daily in the evening at the same time each day with food], #30 (thirty) tablets, Refills: 0 (zero)       [Refilled] metFORMIN 500 mg oral tablet [TAKE 2 TABLETS BY MOUTH TWICE DAILY WITH MORNING MEAL AND WITH EVENING MEAL], #360 (three hundred and sixty) tablets, Refills: 0 (zero)       [Refilled] atorvastatin 40 mg oral tablet [1 tab daily], #90 (ninety) tablets, Refills: 1 (one)       [Refilled] candesartan 16 mg oral tablet [Take 1 tablet(s) by mouth daily], #90 (ninety) tablets, Refills: 1 (one)       [Refilled] omeprazole 20 mg oral capsule,delayed release (enteric coated) [Take 1 capsule by mouth once daily], #90 (ninety) capsules, Refills: 1 (one)         Radiology/Test Orders:       [id number]  Radiologic examination, spine, lumbosacral;  minimum of four views  (Send-Out)            [id number]  Ultrasound, retroperitoneal (eg, renal, aorta, nodes), real time with image documentation; complete   (Send-Out)              Lab Orders:       [id number]  [facility] PHYSICAL: CMP, CBC, TSH, LIPID: [id number], [id number], [id number]  (Send-Out)            [id number]  [facility] - H Hemoglobin A1C  (Send-Out)            [id number]  [facility] - H Microablbumin, quantitative  (Send-Out)              Other Orders:         Smoking and Tobacco Cessation 3 to 10 minutes  (In-House)              Screening mammogram results documented  (Send-Out)                      Plan:         Encounter for general adult medical examination without abnormal findings    LABORATORY:  Labs ordered to be performed today include PHYSICAL PANEL; CMP, CBC, TSH, LIPID.  MIPS Vaccines Flu and Pneumonia updated in Shot record Screening mammomgram done within last 2 years and results in are chart           Orders:       [id number]  [facility] PHYSICAL: CMP, CBC, TSH, LIPID: [id number], [id number], [id number], [id number]  (Send-Out)              Screening mammogram results documented  (Send-Out)              Type 2 diabetes mellitus without complicationsDiscussed with Caitlin her need for compliance with medication to keep her levels down.  Given her history of solitary kidney, need to protect her kidney and take medication as prescribed as well as increases risk of CVD      LABORATORY:  Labs ordered to be performed today include HgbA1C and urine microalbumin.            Prescriptions:       [Refilled] metFORMIN 500 mg oral tablet [TAKE 2 TABLETS BY MOUTH TWICE DAILY WITH MORNING MEAL AND WITH EVENING MEAL], #360 (three hundred and sixty) tablets, Refills: 0 (zero)           Orders:       [id number]  [facility] - [facility] Hemoglobin A1C  (Send-Out)            [id number]  [facility] - [facility] Microablbumin, quantitative  (Send-Out)              Dysthymic disorderTammy is requesting increase in her medication.  I will defer at this time.           Prescriptions:       [Refilled] venlafaxine 37.5 mg oral Tablet, Extended Release 24 hr [take 1 tablet (37.5 mg) by oral route once daily in the evening at the same time  each day with food], #30 (thirty) tablets, Refills: 0 (zero)         Hidradenitis suppurativaTammkenn will research latest treatment on HS (Humira) to see if this is something she would like to discuss further with a dermatologist        Mixed hyperlipidemia          Prescriptions:       [Refilled] atorvastatin 40 mg oral tablet [1 tab daily], #90 (ninety) tablets, Refills: 1 (one)         Nicotine dependence, unspecified, uncomplicatedI have discussed with Caitlin the increased risk assoiciated with smoking and increasing age.  She is continuing to cut back on smoking and will continue        RECOMMENDATIONS given include: Counseled on smoking cessation and advised of the benefits to patient's health if she were to stop smoking. Counseling for 3 to 10 minutes.            Orders:         Smoking and Tobacco Cessation 3 to 10 minutes  (In-House)              Solitary Kidney(Renal agenesis), unilateralShe will follow up with nephrology as recommended        Low back painRecommend xray.  Discussed weight loss, discussed PT as possible treatment - alternate heat and ice.          RADIOLOGY:  I have ordered Lumbar/Sacral Spine X-ray to be done today.            Orders:       [id number]  Radiologic examination, spine, lumbosacral;  minimum of four views  (Send-Out)              Other specified menopausal and perimenopausal disordersWe will do a trial of estradiol to see if controls symptoms.    I have discussed the risks associated with use with smoking as well as other comorbid conditions.  She would like to try and we will evaluate labs to determine if this is something that we will be able to continue **update  - I have instructed Caitlin to not take the estradiol related toher lab and her increased risk for CVD - she will work on compliance with medication to improve her numbers - lower her risk before considering starting this medication***  We will try topical estrogen for her vaginal symptoms but instructed to use  sparingly***        Cyst of kidney, acquiredTammy has been unable to reach nephrology office.  I will order the US of the kidney and forward the result to the nephrologist and have her follow up with that office as recommended        FOLLOW-UP TESTING #1:    RADIOLOGY:  I have ordered Renal Ultrasound Retroperitoneal to be done today.            Orders:       [id number]  Ultrasound, retroperitoneal (eg, renal, aorta, nodes), real time with image documentation; complete  (Send-Out)              Essential (primary) hypertension        RECOMMENDATIONS given include: REcommend current treatment.  Will need better control. She has been instructed to take daily as recommended and follow up with nephrology as rcommended.            Prescriptions:       [Refilled] candesartan 16 mg oral tablet [Take 1 tablet(s) by mouth daily], #90 (ninety) tablets, Refills: 1 (one)       [Refilled] omeprazole 20 mg oral capsule,delayed release (enteric coated) [Take 1 capsule by mouth once daily], #90 (ninety) capsules, Refills: 1 (one)             Patient Recommendations:        For  Nicotine dependence, unspecified, uncomplicated:        Stop smoking.          For  Essential (primary) hypertension:    I also recommend REcommend current treatment.  Will need better control. She has been instructed to take daily as recommended and follow up with nephrology as rcommended.              Charge Capture:         Primary Diagnosis:     Z00.00  Encounter for general adult medical examination without abnormal findings           Orders:      [id number]  Preventive medicine, established patient, age 40-64 years  (In-House)              E11.9  Type 2 diabetes mellitus without complications           Orders:      [id number]  Office/outpatient visit; established patient, level 4  (In-House)              F34.1  Dysthymic disorder     L73.2  Hidradenitis suppurativa     E78.2  Mixed hyperlipidemia     F17.200  Nicotine dependence, unspecified, uncomplicated            Orders:        Smoking and Tobacco Cessation 3 to 10 minutes  (In-House)              Q60.0  Solitary Kidney(Renal agenesis), unilateral     M54.5  Low back pain     N95.8  Other specified menopausal and perimenopausal disorders     N28.1  Cyst of kidney, acquired     I10  Essential (primary) hypertension

## 2021-06-23 ENCOUNTER — OFFICE VISIT (OUTPATIENT)
Dept: FAMILY MEDICINE CLINIC | Age: 51
End: 2021-06-23

## 2021-06-23 ENCOUNTER — LAB (OUTPATIENT)
Dept: LAB | Facility: HOSPITAL | Age: 51
End: 2021-06-23

## 2021-06-23 VITALS
HEART RATE: 93 BPM | TEMPERATURE: 98.8 F | DIASTOLIC BLOOD PRESSURE: 82 MMHG | SYSTOLIC BLOOD PRESSURE: 137 MMHG | WEIGHT: 184.2 LBS | BODY MASS INDEX: 33.9 KG/M2 | HEIGHT: 62 IN

## 2021-06-23 DIAGNOSIS — Z13.6 SCREENING FOR CARDIOVASCULAR CONDITION: ICD-10-CM

## 2021-06-23 DIAGNOSIS — R10.9 FLANK PAIN: ICD-10-CM

## 2021-06-23 DIAGNOSIS — H93.13 TINNITUS OF BOTH EARS: ICD-10-CM

## 2021-06-23 DIAGNOSIS — R00.2 PALPITATIONS: ICD-10-CM

## 2021-06-23 DIAGNOSIS — F17.210 CIGARETTE NICOTINE DEPENDENCE WITHOUT COMPLICATION: ICD-10-CM

## 2021-06-23 DIAGNOSIS — J30.89 ALLERGIC RHINITIS DUE TO OTHER ALLERGIC TRIGGER, UNSPECIFIED SEASONALITY: ICD-10-CM

## 2021-06-23 DIAGNOSIS — E11.9 TYPE 2 DIABETES MELLITUS WITHOUT COMPLICATION, WITHOUT LONG-TERM CURRENT USE OF INSULIN (HCC): Chronic | ICD-10-CM

## 2021-06-23 DIAGNOSIS — E89.41 HOT FLASHES DUE TO SURGICAL MENOPAUSE: ICD-10-CM

## 2021-06-23 DIAGNOSIS — E11.9 TYPE 2 DIABETES MELLITUS WITHOUT COMPLICATION, WITHOUT LONG-TERM CURRENT USE OF INSULIN (HCC): Primary | Chronic | ICD-10-CM

## 2021-06-23 DIAGNOSIS — F34.1 DYSTHYMIC DISORDER: ICD-10-CM

## 2021-06-23 DIAGNOSIS — R53.83 FATIGUE, UNSPECIFIED TYPE: ICD-10-CM

## 2021-06-23 DIAGNOSIS — I10 ESSENTIAL (PRIMARY) HYPERTENSION: ICD-10-CM

## 2021-06-23 DIAGNOSIS — G47.10 HYPERSOMNIA: ICD-10-CM

## 2021-06-23 DIAGNOSIS — E78.2 MIXED HYPERLIPIDEMIA: ICD-10-CM

## 2021-06-23 DIAGNOSIS — N95.8 OTHER SPECIFIED MENOPAUSAL AND PERIMENOPAUSAL DISORDERS: ICD-10-CM

## 2021-06-23 DIAGNOSIS — R19.7 DIARRHEA, UNSPECIFIED TYPE: ICD-10-CM

## 2021-06-23 LAB
25(OH)D3 SERPL-MCNC: 22.9 NG/ML
ALBUMIN SERPL-MCNC: 4.5 G/DL (ref 3.5–5.2)
ALBUMIN/GLOB SERPL: 1.6 G/DL
ALP SERPL-CCNC: 82 U/L (ref 39–117)
ALT SERPL W P-5'-P-CCNC: 37 U/L (ref 1–33)
ANION GAP SERPL CALCULATED.3IONS-SCNC: 11.6 MMOL/L (ref 5–15)
AST SERPL-CCNC: 25 U/L (ref 1–32)
BILIRUB SERPL-MCNC: 0.7 MG/DL (ref 0–1.2)
BUN SERPL-MCNC: 13 MG/DL (ref 6–20)
BUN/CREAT SERPL: 15.1 (ref 7–25)
CALCIUM SPEC-SCNC: 9.5 MG/DL (ref 8.6–10.5)
CHLORIDE SERPL-SCNC: 101 MMOL/L (ref 98–107)
CHOLEST SERPL-MCNC: 233 MG/DL (ref 0–200)
CO2 SERPL-SCNC: 25.4 MMOL/L (ref 22–29)
CREAT SERPL-MCNC: 0.86 MG/DL (ref 0.57–1)
GFR SERPL CREATININE-BSD FRML MDRD: 70 ML/MIN/1.73
GLOBULIN UR ELPH-MCNC: 2.8 GM/DL
GLUCOSE SERPL-MCNC: 118 MG/DL (ref 65–99)
HBA1C MFR BLD: 6.9 % (ref 4.8–5.6)
HDLC SERPL-MCNC: 43 MG/DL (ref 40–60)
LDLC SERPL CALC-MCNC: 133 MG/DL (ref 0–100)
LDLC/HDLC SERPL: 2.93 {RATIO}
MAGNESIUM SERPL-MCNC: 1.6 MG/DL (ref 1.6–2.6)
POTASSIUM SERPL-SCNC: 4.6 MMOL/L (ref 3.5–5.2)
PROT SERPL-MCNC: 7.3 G/DL (ref 6–8.5)
SODIUM SERPL-SCNC: 138 MMOL/L (ref 136–145)
TRIGL SERPL-MCNC: 319 MG/DL (ref 0–150)
TSH SERPL DL<=0.05 MIU/L-ACNC: 1.72 UIU/ML (ref 0.27–4.2)
VIT B12 BLD-MCNC: 300 PG/ML (ref 211–946)
VLDLC SERPL-MCNC: 57 MG/DL (ref 5–40)

## 2021-06-23 PROCEDURE — 99214 OFFICE O/P EST MOD 30 MIN: CPT | Performed by: NURSE PRACTITIONER

## 2021-06-23 PROCEDURE — 83036 HEMOGLOBIN GLYCOSYLATED A1C: CPT

## 2021-06-23 PROCEDURE — 81001 URINALYSIS AUTO W/SCOPE: CPT

## 2021-06-23 PROCEDURE — 82306 VITAMIN D 25 HYDROXY: CPT

## 2021-06-23 PROCEDURE — 82607 VITAMIN B-12: CPT

## 2021-06-23 PROCEDURE — 80053 COMPREHEN METABOLIC PANEL: CPT | Performed by: NURSE PRACTITIONER

## 2021-06-23 PROCEDURE — 84443 ASSAY THYROID STIM HORMONE: CPT | Performed by: NURSE PRACTITIONER

## 2021-06-23 PROCEDURE — 83735 ASSAY OF MAGNESIUM: CPT

## 2021-06-23 PROCEDURE — 80061 LIPID PANEL: CPT | Performed by: NURSE PRACTITIONER

## 2021-06-23 PROCEDURE — 36415 COLL VENOUS BLD VENIPUNCTURE: CPT | Performed by: NURSE PRACTITIONER

## 2021-06-23 PROCEDURE — 85025 COMPLETE CBC W/AUTO DIFF WBC: CPT | Performed by: NURSE PRACTITIONER

## 2021-06-23 RX ORDER — OMEPRAZOLE 20 MG/1
CAPSULE, DELAYED RELEASE ORAL
COMMUNITY
Start: 2021-04-27 | End: 2021-07-14

## 2021-06-23 RX ORDER — FLUTICASONE PROPIONATE 50 MCG
2 SPRAY, SUSPENSION (ML) NASAL DAILY
Qty: 18 ML | Refills: 3 | Status: SHIPPED | OUTPATIENT
Start: 2021-06-23 | End: 2022-08-30

## 2021-06-23 RX ORDER — MINOCYCLINE HYDROCHLORIDE 100 MG/1
CAPSULE ORAL
COMMUNITY
Start: 2021-04-27 | End: 2021-07-14

## 2021-06-23 RX ORDER — VENLAFAXINE HYDROCHLORIDE 75 MG/1
75 CAPSULE, EXTENDED RELEASE ORAL DAILY
Qty: 90 CAPSULE | Refills: 1 | Status: SHIPPED | OUTPATIENT
Start: 2021-06-23 | End: 2021-07-14 | Stop reason: SDUPTHER

## 2021-06-23 RX ORDER — ATORVASTATIN CALCIUM 40 MG/1
40 TABLET, FILM COATED ORAL DAILY
COMMUNITY
End: 2021-09-27 | Stop reason: SDUPTHER

## 2021-06-23 RX ORDER — VENLAFAXINE HYDROCHLORIDE 37.5 MG/1
TABLET, EXTENDED RELEASE ORAL
COMMUNITY
Start: 2021-04-29 | End: 2021-06-23 | Stop reason: DRUGHIGH

## 2021-06-23 RX ORDER — CANDESARTAN 16 MG/1
TABLET ORAL
COMMUNITY
Start: 2021-04-27 | End: 2021-07-14

## 2021-06-23 NOTE — PROGRESS NOTES
"Chief Complaint  Fatigue (labs), Diabetes, and Anxiety (refills)    Subjective          Caitlin Escalera presents to [facility]   Caitlin is here today for follow up on several things that she has going on.  She reports concern over the menopausal symptoms.  She is very tired/falling asleep when sitting still.  She reports that she has quit taking her medication due to flank pain (candasartan and atorvastatin)  She stopped this about 1-2 weeks ago and reports that her symptoms improved after stopping. She has been having brain fog/ palpitations/tinnitus and abdominal problems.  Diarrhea and bloating, but this has been going on for some time            Review of Systems   Constitutional: Positive for diaphoresis (night time hot flashes) and fatigue. Negative for fever.   HENT: Positive for tinnitus.    Cardiovascular: Positive for palpitations.   Gastrointestinal: Positive for abdominal distention and diarrhea. Negative for blood in stool and nausea.   Endocrine: Positive for heat intolerance.        Hot flashes   Genitourinary: Positive for flank pain.   Musculoskeletal: Negative for arthralgias.        Right side/flank pain   Neurological:        Memory issues   Psychiatric/Behavioral: Positive for decreased concentration (\"brain fog\"). The patient is nervous/anxious.         Health Maintenance Due   Topic Date Due   • COLORECTAL CANCER SCREENING  Never done   • ANNUAL PHYSICAL  Never done   • Pneumococcal Vaccine 0-64 (1 of 1 - PPSV23) Never done   • COVID-19 Vaccine (1) Never done   • ZOSTER VACCINE (1 of 2) Never done   • LUNG CANCER SCREENING  Never done   • TDAP/TD VACCINES (3 - Tdap) 04/07/2021   • HEPATITIS C SCREENING  Never done   • DIABETIC FOOT EXAM  Never done   • DIABETIC EYE EXAM  Never done        Objective     Vital Signs:   /82 (BP Location: Left arm, Patient Position: Sitting)   Pulse 93   Temp 98.8 °F (37.1 °C)   Ht 158.1 cm (62.24\")   Wt 83.6 kg (184 lb " 3.2 oz)   BMI 33.43 kg/m²       Physical Exam  Constitutional:       General: She is not in acute distress.     Appearance: Normal appearance. She is obese.   HENT:      Head: Normocephalic.      Left Ear: Tympanic membrane is erythematous.   Neck:      Thyroid: No thyromegaly.   Cardiovascular:      Rate and Rhythm: Normal rate and regular rhythm.   Pulmonary:      Effort: Pulmonary effort is normal.      Breath sounds: Normal breath sounds.   Musculoskeletal:         General: Normal range of motion.   Lymphadenopathy:      Cervical: No cervical adenopathy.   Neurological:      General: No focal deficit present.      Mental Status: She is alert and oriented to person, place, and time.   Psychiatric:         Mood and Affect: Mood is anxious.         Behavior: Behavior normal.          Result Review :     The following data was reviewed by: KEYLA Oliva on 06/23/2021:  Physical [facility] Panel (01/11/2021 12:28)  Hemoglobin A1C With EAG (01/11/2021 12:28)                       Assessment and Plan    Diagnoses and all orders for this visit:    1. Type 2 diabetes mellitus without complication, without long-term current use of insulin (CMS/[facility]) (Primary)  Comments:  continue current treatment with metformin  and labs today  Assessment & Plan:  Diabetes is improving with treatment.   Continue current treatment regimen.  Reminded to bring in blood sugar diary at next visit.  Dietary recommendations for ADA diet.  Diabetes will be reassessed in 6 months.    Orders:  -     Hemoglobin A1c; Future    2. Fatigue, unspecified type  Comments:  Labs, consider sleep study if no indication from labs  Orders:  -     Vitamin B12; Future  -     Vitamin D 25 Hydroxy; Future  -     Magnesium; Future    3. Flank pain  Comments:  I would recommend that she use heating pad - unsure how this is connected to the medication - but have encouraged her to continue medication due to benefit    4. Tinnitus of both  ears  Comments:  appears to be allergy related - recommend OTC zyrtec/ allegra and resume flonase - follow up if new or worsening symptoms     5. Hypersomnia  Comments:  Pending labs, recommend sleep study - she will check with insurance regarding cost/ recommendations    6. Palpitations    7. Diarrhea, unspecified type  Comments:  I have recommended that she try probiotics - may consider colestid in future due to possible post choley diarrhea    8. Cigarette nicotine dependence without complication  Comments:  I have discussed with Caitlin her elevated risks of CVD due to her ongoing smoking.  She will reach out to her insurance and find out what medication is covered.   Assessment & Plan:  Tobacco use is unchanged.  Smoking cessation counseling was provided.  she will call insurance to see what is covered for assistance  Tobacco use will be reassessed at the next regular appointment.      9. Allergic rhinitis due to other allergic trigger, unspecified seasonality  -     fluticasone (Flonase) 50 MCG/ACT nasal spray; 2 sprays into the nostril(s) as directed by provider Daily.  Dispense: 18 mL; Refill: 3  -     venlafaxine XR (Effexor XR) 75 MG 24 hr capsule; Take 1 capsule by mouth Daily for 90 days.  Dispense: 90 capsule; Refill: 1    10. Essential (primary) hypertension  Comments:  Needs to be on ACE or ARB for kidney protection - if unable to resume candesartan, consider alternative   Assessment & Plan:  Hypertension is unchanged.  Continue current treatment regimen.  Weight loss.  Stop smoking.  Continue current medications.  Blood pressure will be reassessed at the next regular appointment.      11. Dysthymic disorder  Comments:  increasing medicaiton today, will have her follow up in 4-6 weeks   Assessment & Plan:  Patient's depression is recurrent and is moderate without psychosis. Their depression is currently active and the condition is worsening. This will be reassessed at the next regular appointment. F/U  as described:patient will continue current medication therapy. Increased dose      12. Other specified menopausal and perimenopausal disorders  Comments:  Discussed that her 10 yr CVD risk is 16.9% which is contraindicated in treatment Discussed she will need to try OTC/ conservative  therapy    13. Hot flashes due to surgical menopause    14. Mixed hyperlipidemia  Assessment & Plan:  Lipid abnormalities are unchanged.  Nutritional counseling was provided. and encouraged to take medication as prescribed  Lipids will be reassessed in 6 months.      15. Screening for cardiovascular condition  -     Comprehensive Metabolic Panel  -     CBC & Differential  -     TSH  -     Lipid Panel  -     Urinalysis With Culture If Indicated -; Future          Follow Up    No follow-ups on file.      Patient was given instructions and counseling regarding her condition or for health maintenance advice. Please see specific information pulled into the AVS if appropriate.

## 2021-06-23 NOTE — ASSESSMENT & PLAN NOTE
Tobacco use is unchanged.  Smoking cessation counseling was provided.  she will call insurance to see what is covered for assistance  Tobacco use will be reassessed at the next regular appointment.

## 2021-06-23 NOTE — ASSESSMENT & PLAN NOTE
Hypertension is unchanged.  Continue current treatment regimen.  Weight loss.  Stop smoking.  Continue current medications.  Blood pressure will be reassessed at the next regular appointment.

## 2021-06-23 NOTE — ASSESSMENT & PLAN NOTE
Diabetes is improving with treatment.   Continue current treatment regimen.  Reminded to bring in blood sugar diary at next visit.  Dietary recommendations for ADA diet.  Diabetes will be reassessed in 6 months.

## 2021-06-23 NOTE — ASSESSMENT & PLAN NOTE
Patient's depression is recurrent and is moderate without psychosis. Their depression is currently active and the condition is worsening. This will be reassessed at the next regular appointment. F/U as described:patient will continue current medication therapy. Increased dose

## 2021-06-23 NOTE — ASSESSMENT & PLAN NOTE
Lipid abnormalities are unchanged.  Nutritional counseling was provided. and encouraged to take medication as prescribed  Lipids will be reassessed in 6 months.

## 2021-06-24 LAB
BACTERIA UR QL AUTO: ABNORMAL /HPF
BASOPHILS # BLD AUTO: 0.06 10*3/MM3 (ref 0–0.2)
BASOPHILS NFR BLD AUTO: 0.7 % (ref 0–1.5)
BILIRUB UR QL STRIP: NEGATIVE
CLARITY UR: ABNORMAL
COLOR UR: YELLOW
DEPRECATED RDW RBC AUTO: 44 FL (ref 37–54)
EOSINOPHIL # BLD AUTO: 0.2 10*3/MM3 (ref 0–0.4)
EOSINOPHIL NFR BLD AUTO: 2.3 % (ref 0.3–6.2)
ERYTHROCYTE [DISTWIDTH] IN BLOOD BY AUTOMATED COUNT: 12.8 % (ref 12.3–15.4)
GLUCOSE UR STRIP-MCNC: NEGATIVE MG/DL
HCT VFR BLD AUTO: 43.5 % (ref 34–46.6)
HGB BLD-MCNC: 15.5 G/DL (ref 12–15.9)
HGB UR QL STRIP.AUTO: ABNORMAL
HYALINE CASTS UR QL AUTO: ABNORMAL /LPF
IMM GRANULOCYTES # BLD AUTO: 0.04 10*3/MM3 (ref 0–0.05)
IMM GRANULOCYTES NFR BLD AUTO: 0.5 % (ref 0–0.5)
KETONES UR QL STRIP: NEGATIVE
LEUKOCYTE ESTERASE UR QL STRIP.AUTO: NEGATIVE
LYMPHOCYTES # BLD AUTO: 3.1 10*3/MM3 (ref 0.7–3.1)
LYMPHOCYTES NFR BLD AUTO: 35.4 % (ref 19.6–45.3)
MCH RBC QN AUTO: 33.4 PG (ref 26.6–33)
MCHC RBC AUTO-ENTMCNC: 35.6 G/DL (ref 31.5–35.7)
MCV RBC AUTO: 93.8 FL (ref 79–97)
MONOCYTES # BLD AUTO: 0.6 10*3/MM3 (ref 0.1–0.9)
MONOCYTES NFR BLD AUTO: 6.8 % (ref 5–12)
NEUTROPHILS NFR BLD AUTO: 4.76 10*3/MM3 (ref 1.7–7)
NEUTROPHILS NFR BLD AUTO: 54.3 % (ref 42.7–76)
NITRITE UR QL STRIP: NEGATIVE
NRBC BLD AUTO-RTO: 0.1 /100 WBC (ref 0–0.2)
PH UR STRIP.AUTO: 5.5 [PH] (ref 5–8)
PLATELET # BLD AUTO: 278 10*3/MM3 (ref 140–450)
PMV BLD AUTO: 9.9 FL (ref 6–12)
PROT UR QL STRIP: ABNORMAL
RBC # BLD AUTO: 4.64 10*6/MM3 (ref 3.77–5.28)
RBC # UR: ABNORMAL /HPF
REF LAB TEST METHOD: ABNORMAL
SP GR UR STRIP: 1.02 (ref 1–1.03)
SQUAMOUS #/AREA URNS HPF: ABNORMAL /HPF
UROBILINOGEN UR QL STRIP: ABNORMAL
WBC # BLD AUTO: 8.76 10*3/MM3 (ref 3.4–10.8)
WBC UR QL AUTO: ABNORMAL /HPF

## 2021-06-29 PROBLEM — E55.9 VITAMIN D DEFICIENCY: Status: ACTIVE | Noted: 2021-06-29

## 2021-06-29 PROBLEM — E53.8 B12 DEFICIENCY: Status: ACTIVE | Noted: 2021-06-29

## 2021-07-01 VITALS
DIASTOLIC BLOOD PRESSURE: 79 MMHG | HEIGHT: 62 IN | WEIGHT: 182.4 LBS | HEART RATE: 94 BPM | SYSTOLIC BLOOD PRESSURE: 142 MMHG | TEMPERATURE: 97.9 F | BODY MASS INDEX: 33.57 KG/M2

## 2021-07-01 VITALS
HEART RATE: 88 BPM | WEIGHT: 179.2 LBS | DIASTOLIC BLOOD PRESSURE: 64 MMHG | HEIGHT: 62 IN | SYSTOLIC BLOOD PRESSURE: 127 MMHG | BODY MASS INDEX: 32.97 KG/M2 | TEMPERATURE: 98.5 F

## 2021-07-01 VITALS
HEART RATE: 88 BPM | SYSTOLIC BLOOD PRESSURE: 107 MMHG | TEMPERATURE: 97.8 F | DIASTOLIC BLOOD PRESSURE: 71 MMHG | HEIGHT: 62 IN | BODY MASS INDEX: 33.75 KG/M2 | WEIGHT: 183.4 LBS

## 2021-07-01 VITALS
HEART RATE: 86 BPM | BODY MASS INDEX: 33.21 KG/M2 | WEIGHT: 180.5 LBS | HEIGHT: 62 IN | TEMPERATURE: 98.9 F | SYSTOLIC BLOOD PRESSURE: 144 MMHG | DIASTOLIC BLOOD PRESSURE: 90 MMHG

## 2021-07-02 VITALS
BODY MASS INDEX: 34.71 KG/M2 | TEMPERATURE: 97.8 F | HEART RATE: 92 BPM | WEIGHT: 188.6 LBS | SYSTOLIC BLOOD PRESSURE: 133 MMHG | DIASTOLIC BLOOD PRESSURE: 65 MMHG | HEIGHT: 62 IN

## 2021-07-02 VITALS
DIASTOLIC BLOOD PRESSURE: 86 MMHG | WEIGHT: 188.2 LBS | SYSTOLIC BLOOD PRESSURE: 130 MMHG | BODY MASS INDEX: 34.63 KG/M2 | HEIGHT: 62 IN | TEMPERATURE: 97.2 F

## 2021-07-14 RX ORDER — OMEPRAZOLE 20 MG/1
CAPSULE, DELAYED RELEASE ORAL
Qty: 90 CAPSULE | Refills: 1 | Status: SHIPPED | OUTPATIENT
Start: 2021-07-14 | End: 2021-11-03 | Stop reason: SDUPTHER

## 2021-07-14 RX ORDER — VENLAFAXINE HYDROCHLORIDE 37.5 MG/1
TABLET, EXTENDED RELEASE ORAL
Qty: 90 TABLET | Refills: 1 | Status: SHIPPED | OUTPATIENT
Start: 2021-07-14 | End: 2021-11-02

## 2021-07-14 RX ORDER — CANDESARTAN 16 MG/1
TABLET ORAL
Qty: 90 TABLET | Refills: 1 | Status: SHIPPED | OUTPATIENT
Start: 2021-07-14 | End: 2022-03-10 | Stop reason: SDUPTHER

## 2021-07-14 RX ORDER — MINOCYCLINE HYDROCHLORIDE 100 MG/1
CAPSULE ORAL
Qty: 180 CAPSULE | Refills: 1 | Status: SHIPPED | OUTPATIENT
Start: 2021-07-14 | End: 2022-06-13 | Stop reason: SDUPTHER

## 2021-07-14 NOTE — TELEPHONE ENCOUNTER
Omeprazole lf 1/12/21 #90 1 rf  Minocycline lf 4/27/21 #180  Candesartan lf 1/12/21 #90 1 rf  Venlafaxine lf 4/27/21 #90  lov 6/23/21

## 2021-08-03 RX ORDER — CLINDAMYCIN PHOSPHATE 10 MG/G
GEL TOPICAL
Qty: 30 G | Refills: 2 | Status: SHIPPED | OUTPATIENT
Start: 2021-08-03 | End: 2022-06-01

## 2021-09-21 ENCOUNTER — TELEPHONE (OUTPATIENT)
Dept: FAMILY MEDICINE CLINIC | Age: 51
End: 2021-09-21

## 2021-09-27 DIAGNOSIS — E11.9 TYPE 2 DIABETES MELLITUS WITHOUT COMPLICATION, WITHOUT LONG-TERM CURRENT USE OF INSULIN (HCC): Chronic | ICD-10-CM

## 2021-09-27 NOTE — TELEPHONE ENCOUNTER
Caller: Caitlin Escalera    Relationship: Self      Medication requested (name and dosage):   atorvastatin (LIPITOR) 40 MG tablet  metFORMIN (GLUCOPHAGE) 500 MG tablet    Pharmacy where request should be sent: [place] [id number] [address] [facility] [phone]  - [phone] FX      Additional details provided by patient: PATIENT IS COMPLETELY OUT     Best call back number: [phone]    Does the patient have less than a 3 day supply:  [x] Yes  [] No    Nathan Kaur Rep   09/27/21 10:54 EDT

## 2021-09-28 RX ORDER — ATORVASTATIN CALCIUM 40 MG/1
40 TABLET, FILM COATED ORAL DAILY
Qty: 90 TABLET | Refills: 1 | Status: SHIPPED | OUTPATIENT
Start: 2021-09-28 | End: 2022-04-05

## 2021-09-30 DIAGNOSIS — E11.9 TYPE 2 DIABETES MELLITUS WITHOUT COMPLICATION, WITHOUT LONG-TERM CURRENT USE OF INSULIN (HCC): Chronic | ICD-10-CM

## 2021-10-28 DIAGNOSIS — Z12.31 SCREENING MAMMOGRAM FOR BREAST CANCER: Primary | ICD-10-CM

## 2021-11-02 ENCOUNTER — OFFICE VISIT (OUTPATIENT)
Dept: FAMILY MEDICINE CLINIC | Age: 51
End: 2021-11-02

## 2021-11-02 VITALS
HEIGHT: 62 IN | BODY MASS INDEX: 33.31 KG/M2 | HEART RATE: 102 BPM | WEIGHT: 181 LBS | TEMPERATURE: 98.6 F | SYSTOLIC BLOOD PRESSURE: 114 MMHG | DIASTOLIC BLOOD PRESSURE: 70 MMHG

## 2021-11-02 DIAGNOSIS — Z78.0 MENOPAUSE: ICD-10-CM

## 2021-11-02 DIAGNOSIS — M79.10 MYALGIA: ICD-10-CM

## 2021-11-02 DIAGNOSIS — I10 ESSENTIAL (PRIMARY) HYPERTENSION: ICD-10-CM

## 2021-11-02 DIAGNOSIS — E11.9 TYPE 2 DIABETES MELLITUS WITHOUT COMPLICATION, WITHOUT LONG-TERM CURRENT USE OF INSULIN (HCC): ICD-10-CM

## 2021-11-02 DIAGNOSIS — E53.8 B12 DEFICIENCY: ICD-10-CM

## 2021-11-02 DIAGNOSIS — R00.2 PALPITATION: Primary | ICD-10-CM

## 2021-11-02 DIAGNOSIS — R19.7 DIARRHEA, UNSPECIFIED TYPE: ICD-10-CM

## 2021-11-02 DIAGNOSIS — R53.83 FATIGUE, UNSPECIFIED TYPE: ICD-10-CM

## 2021-11-02 DIAGNOSIS — E55.9 VITAMIN D DEFICIENCY: ICD-10-CM

## 2021-11-02 PROCEDURE — 99214 OFFICE O/P EST MOD 30 MIN: CPT | Performed by: FAMILY MEDICINE

## 2021-11-02 PROCEDURE — 93000 ELECTROCARDIOGRAM COMPLETE: CPT | Performed by: FAMILY MEDICINE

## 2021-11-02 RX ORDER — PROMETHAZINE HYDROCHLORIDE 12.5 MG/1
12.5 TABLET ORAL EVERY 6 HOURS PRN
Qty: 10 TABLET | Refills: 0 | Status: SHIPPED | OUTPATIENT
Start: 2021-11-02 | End: 2023-03-15

## 2021-11-02 RX ORDER — VENLAFAXINE HYDROCHLORIDE 75 MG/1
75 CAPSULE, EXTENDED RELEASE ORAL DAILY
Qty: 90 CAPSULE | Refills: 1 | Status: SHIPPED | OUTPATIENT
Start: 2021-11-02 | End: 2022-06-13 | Stop reason: SDUPTHER

## 2021-11-02 RX ORDER — BLOOD-GLUCOSE METER
EACH MISCELLANEOUS
COMMUNITY
Start: 2021-08-06

## 2021-11-02 RX ORDER — SACCHAROMYCES BOULARDII 250 MG
250 CAPSULE ORAL 2 TIMES DAILY
Qty: 180 CAPSULE | Refills: 1 | Status: SHIPPED | OUTPATIENT
Start: 2021-11-02 | End: 2022-08-30

## 2021-11-02 RX ORDER — BLOOD SUGAR DIAGNOSTIC
1 STRIP MISCELLANEOUS 3 TIMES DAILY
COMMUNITY
Start: 2021-08-02 | End: 2022-10-11 | Stop reason: SDUPTHER

## 2021-11-02 RX ORDER — MELATONIN 10 MG
TABLET, SUBLINGUAL SUBLINGUAL
COMMUNITY
End: 2022-08-30

## 2021-11-02 RX ORDER — LANCETS
EACH MISCELLANEOUS
COMMUNITY
Start: 2021-08-02

## 2021-11-02 NOTE — PROGRESS NOTES
Caitlin Escalera presents to [facility].    Chief Complaint:  High HR    Subjective       History of Present Illness:  HPI    Caitlin is in today with concerns about rapid HR, this is occurring at rest, ranges from  and changes rapidly throughout the day, she is not in any pain, has mild SOA and dizzy spells.  No chest pain.      Patient presents with type 2 diabetes mellitus without complications.  Specifically, this is type 2, non-insulin requiring diabetes.  She reports home blood glucose readings have been excellent, with average fasting glucoses running  mg/dL.   She is on metformin and it is causing diarrhea.  She does not follow up as recommended               Additionally, she presents with history of mixed hyperlipidemia, she is on atorvastatin 40 mg daily    High BP-stable on candesartan daily for a cyst in her only kidney (left)    She is on 99267 units vitamin D daily    Chronic GERD and she is stable on omeprazole    She is menopausal and on effexor  37.5 mg and this helps her dysthmic disorder.  She is sleeping ok.       Review of Systems:  Review of Systems   Constitutional: Positive for fatigue. Negative for chills and fever.   HENT: Negative for ear pain, sinus pressure and sore throat.    Eyes: Negative for blurred vision and double vision.   Respiratory: Negative for cough, shortness of breath and wheezing.    Cardiovascular: Positive for palpitations. Negative for chest pain and leg swelling.   Gastrointestinal: Positive for diarrhea. Negative for abdominal pain, blood in stool, constipation, nausea and vomiting.   Genitourinary: Negative for dysuria.   Skin: Negative for rash.   Neurological: Positive for dizziness. Negative for headache.   Psychiatric/Behavioral: Negative for sleep disturbance, suicidal ideas and depressed mood. The patient is not nervous/anxious.         Objective   Medical History:  Past Medical History:   • Cyst of kidney, acquired   •  Dyshidrosis   • Dysthymic disorder   • Elevated white blood cell count   • Essential (primary) hypertension   • Heartburn   • Hidradenitis   • HLD (hyperlipidemia)   • Low back pain   • Nicotine dependence, unspecified, uncomplicated   • Other specified menopausal and perimenopausal disorders   • Overweight   • Personal history of other diseases of urinary system   • Proteinuria   • Solitary kidney   • Type 2 diabetes mellitus without complication (HCC)     Past Surgical History:   • CHOLECYSTECTOMY   • HYSTERECTOMY    In 30s   • TUBAL ABDOMINAL LIGATION      Family History   Problem Relation Age of Onset   • Hypothyroidism Mother    • Diabetes type II Father    • Heart failure Father    • Cancer Brother         mantel cell cancer     Social History     Tobacco Use   • Smoking status: Current Every Day Smoker     Packs/day: 1.00     Years: 30.00     Pack years: 30.00     Types: Cigarettes   • Smokeless tobacco: Never Used   • Tobacco comment: checking with insurance for coverage for medication assistance    Substance Use Topics   • Alcohol use: Not Currently       Health Maintenance Due   Topic Date Due   • COLORECTAL CANCER SCREENING  Never done   • ANNUAL PHYSICAL  Never done   • Pneumococcal Vaccine 0-64 (1 of 2 - PPSV23) Never done   • COVID-19 Vaccine (1) Never done   • ZOSTER VACCINE (1 of 2) Never done   • LUNG CANCER SCREENING  Never done   • TDAP/TD VACCINES (3 - Tdap) 04/07/2021   • HEPATITIS C SCREENING  Never done   • DIABETIC FOOT EXAM  Never done   • DIABETIC EYE EXAM  Never done   • INFLUENZA VACCINE  08/01/2021        Immunization History   Administered Date(s) Administered   • Flu Vaccine Quad PF >18YRS 09/01/2017   • Hepatitis B 04/07/2011, 05/17/2011, 10/22/2012   • MMR 04/07/2011   • Td 04/07/2011       Allergies   Allergen Reactions   • Demerol [Meperidine] Unknown - High Severity   • Lisinopril Unknown - Low Severity   • Morphine Unknown - High Severity        Medications:  Current Outpatient  "Medications on File Prior to Visit   Medication Sig   • Accu-Chek Guide test strip 1 each by Other route 3 (Three) Times a Day. use to check glucose 3 times daily   • Accu-Chek Softclix Lancets lancets USE 1 UNIT TO CHECK GLUCOSE ONCE DAILY   • atorvastatin (LIPITOR) 40 MG tablet Take 1 tablet by mouth Daily. Discussed to cut in half and adjust to recommended dose based on symptoms.  Take as often as tolerated   • Blood Glucose Monitoring Suppl (Accu-Chek Guide) w/Device kit USE UNIT TO CHECK GLUCOSE THREE TIMES DAILY   • candesartan (ATACAND) 16 MG tablet TAKE 1 TABLET DAILY   • Cholecalciferol (Vitamin D3) 250 MCG (27070 UT) tablet Take  by mouth.   • clindamycin (CLINDAGEL) 1 % gel APPLY TOPICALLY IN THE MORNING AND NIGHT   • fluticasone (Flonase) 50 MCG/ACT nasal spray 2 sprays into the nostril(s) as directed by provider Daily.   • metFORMIN (GLUCOPHAGE) 500 MG tablet Take 2 tablets by mouth 2 (Two) Times a Day With Meals.   • minocycline (MINOCIN,DYNACIN) 100 MG capsule TAKE 1 CAPSULE TWICE DAILY   • omeprazole (priLOSEC) 20 MG capsule TAKE 1 CAPSULE ONCE DAILY   • Probiotic Product (PROBIOTIC PO) Take  by mouth Daily.   • [DISCONTINUED] venlafaxine 37.5 MG tablet sustained-release 24 hour 24 hr tablet TAKE 1 TABLET ONCE DAILY INTHE EVENING AT THE SAME    TIME EACH DAY WITH FOOD     No current facility-administered medications on file prior to visit.       Vital Signs:   /70 (BP Location: Right arm, Patient Position: Sitting, Cuff Size: Large Adult)   Pulse 102   Temp 98.6 °F (37 °C) (Oral)   Ht 158.1 cm (62.24\")   Wt 82.1 kg (181 lb)   BMI 32.85 kg/m²       Physical Exam:  Physical Exam  Vitals and nursing note reviewed.   Constitutional:       General: She is not in acute distress.     Appearance: Normal appearance. She is not ill-appearing, toxic-appearing or diaphoretic.   HENT:      Head: Normocephalic and atraumatic.      Right Ear: Tympanic membrane, ear canal and external ear normal.      " Left Ear: Tympanic membrane, ear canal and external ear normal.      Nose: No congestion or rhinorrhea.      Mouth/Throat:      Pharynx: Oropharynx is clear. No oropharyngeal exudate or posterior oropharyngeal erythema.   Eyes:      Extraocular Movements: Extraocular movements intact.      Conjunctiva/sclera: Conjunctivae normal.   Cardiovascular:      Rate and Rhythm: Normal rate and regular rhythm.      Pulses:           Dorsalis pedis pulses are 2+ on the right side and 2+ on the left side.      Heart sounds: Normal heart sounds.   Pulmonary:      Breath sounds: Normal breath sounds. No wheezing, rhonchi or rales.   Abdominal:      General: Abdomen is flat.      Palpations: Abdomen is soft.   Musculoskeletal:      Cervical back: Neck supple. No rigidity.   Feet:      Right foot:      Protective Sensation: 7 sites tested. 7 sites sensed.      Skin integrity: Callus present. No ulcer or blister.      Toenail Condition: Right toenails are normal.      Left foot:      Protective Sensation: 7 sites tested. 7 sites sensed.      Skin integrity: Callus present. No ulcer or blister.      Toenail Condition: Left toenails are normal.      Comments:      Lymphadenopathy:      Cervical: No cervical adenopathy.   Skin:     General: Skin is warm and dry.   Neurological:      Mental Status: She is alert and oriented to person, place, and time.   Psychiatric:         Mood and Affect: Mood normal.         Behavior: Behavior normal.         Result Review      The following data was reviewed by Rosalina Woodard MD on 11/02/2021.  Lab Results   Component Value Date    WBC 8.76 06/23/2021    HGB 15.5 06/23/2021    HCT 43.5 06/23/2021    MCV 93.8 06/23/2021     06/23/2021     Lab Results   Component Value Date    GLUCOSE 118 (H) 06/23/2021    BUN 13 06/23/2021    CREATININE 0.86 06/23/2021    EGFRIFNONA 70 06/23/2021    BCR 15.1 06/23/2021    K 4.6 06/23/2021    CO2 25.4 06/23/2021    CALCIUM 9.5 06/23/2021    ALBUMIN 4.50  06/23/2021    LABIL2 1.6 01/11/2021    AST 25 06/23/2021    ALT 37 (H) 06/23/2021     Lab Results   Component Value Date    CHOL 233 (H) 06/23/2021    CHLPL 197 01/11/2021    TRIG 319 (H) 06/23/2021    HDL 43 06/23/2021     (H) 06/23/2021     Lab Results   Component Value Date    TSH 1.720 06/23/2021     Lab Results   Component Value Date    HGBA1C 6.90 (H) 06/23/2021     No results found for: PSA             ECG 12 Lead    Date/Time: 11/2/2021 12:41 PM  Performed by: Rosalina Woodard MD  Authorized by: Rosalina Woodard MD   Comparison: not compared with previous ECG   Rhythm: sinus rhythm  Rate: normal  Conduction: conduction normal  QRS axis: normal    Clinical impression: normal ECG                Assessment and Plan:          Diagnoses and all orders for this visit:    1. Palpitation (Primary)  -     ECG 12 Lead  -     Holter monitor - 48 hour; Future    2. Type 2 diabetes mellitus without complication, without long-term current use of insulin (HCC)  Comments:  Stable on metformin but it is causing her diarrhea.  I will try her on Ozempic and decrease her Metformin to 1 pill twice a day recommend yearly flu vaccination  Orders:  -     Comprehensive Metabolic Panel; Future  -     CBC (No Diff); Future  -     Lipid Panel; Future  -     Hemoglobin A1c; Future  -     Microalbumin / Creatinine Urine Ratio - Urine, Clean Catch; Future  -     Semaglutide(0.25 or 0.5MG/DOS) (OZEMPIC) solution pen-injector 0.25 mg    3. Essential (primary) hypertension  Comments:  Stable on medication    4. B12 deficiency  Comments:  Is on B12 supplementation.  Will check labs today  Orders:  -     Vitamin B12; Future    5. Vitamin D deficiency  Comments:  Sinew current vitamin D supplementation and check labs today  Orders:  -     Vitamin D 25 hydroxy; Future    6. Myalgia  Comments:  Especially with foot cramping.  Will check magnesium, calcium, potassium, TSH today  Orders:  -     Magnesium; Future    7. Fatigue,  unspecified type  Comments:  Chronic especially mid afternoon.  Will check blood work  Orders:  -     TSH+Free T4; Future    8. Diarrhea, unspecified type  Comments:  Decrease Metformin to 1 pill twice a day  Orders:  -     saccharomyces boulardii (Florastor) 250 MG capsule; Take 1 capsule by mouth 2 (Two) Times a Day.  Dispense: 180 capsule; Refill: 1    9. Menopause  Comments:  Worse with mood swings.  Will increase Effexor to 75 mg daily  Orders:  -     venlafaxine XR (EFFEXOR-XR) 75 MG 24 hr capsule; Take 1 capsule by mouth Daily for 180 days.  Dispense: 90 capsule; Refill: 1    Other orders  -     promethazine (PHENERGAN) 12.5 MG tablet; Take 1 tablet by mouth Every 6 (Six) Hours As Needed for Nausea or Vomiting.  Dispense: 10 tablet; Refill: 0          Follow Up   Return in about 3 months (around 2/2/2022) for WWE.

## 2021-11-03 RX ORDER — OMEPRAZOLE 20 MG/1
20 CAPSULE, DELAYED RELEASE ORAL DAILY
Qty: 90 CAPSULE | Refills: 1 | Status: SHIPPED | OUTPATIENT
Start: 2021-11-03 | End: 2022-03-24

## 2021-11-04 DIAGNOSIS — E11.9 TYPE 2 DIABETES MELLITUS WITHOUT COMPLICATION, WITHOUT LONG-TERM CURRENT USE OF INSULIN (HCC): Primary | ICD-10-CM

## 2021-11-04 RX ORDER — SEMAGLUTIDE 1.34 MG/ML
0.5 INJECTION, SOLUTION SUBCUTANEOUS WEEKLY
Qty: 1 PEN | Refills: 5 | Status: SHIPPED | OUTPATIENT
Start: 2021-11-04 | End: 2022-04-25

## 2021-11-04 NOTE — TELEPHONE ENCOUNTER
Tell her sorry about the medication.  It was sent to the clinic and not to [facility].  I have resent it to [facility].  I am unsure who she calls about her concerns about her pain meds and her current pain treatment.  I will ask around and see what I can find out.  Dr. Woodard

## 2021-12-14 ENCOUNTER — OFFICE VISIT (OUTPATIENT)
Dept: FAMILY MEDICINE CLINIC | Age: 51
End: 2021-12-14

## 2021-12-14 ENCOUNTER — HOSPITAL ENCOUNTER (OUTPATIENT)
Dept: MAMMOGRAPHY | Facility: HOSPITAL | Age: 51
Discharge: HOME | End: 2021-12-14

## 2021-12-14 ENCOUNTER — LAB (OUTPATIENT)
Dept: LAB | Facility: HOSPITAL | Age: 51
End: 2021-12-14

## 2021-12-14 VITALS
TEMPERATURE: 98.2 F | OXYGEN SATURATION: 97 % | DIASTOLIC BLOOD PRESSURE: 79 MMHG | HEIGHT: 62 IN | BODY MASS INDEX: 32.39 KG/M2 | HEART RATE: 94 BPM | WEIGHT: 176 LBS | SYSTOLIC BLOOD PRESSURE: 117 MMHG

## 2021-12-14 DIAGNOSIS — Z11.59 ENCOUNTER FOR SCREENING FOR OTHER VIRAL DISEASES: ICD-10-CM

## 2021-12-14 DIAGNOSIS — Z12.11 COLON CANCER SCREENING: ICD-10-CM

## 2021-12-14 DIAGNOSIS — L73.2 HYDRADENITIS: ICD-10-CM

## 2021-12-14 DIAGNOSIS — E11.9 TYPE 2 DIABETES MELLITUS WITHOUT COMPLICATION, WITHOUT LONG-TERM CURRENT USE OF INSULIN (HCC): ICD-10-CM

## 2021-12-14 DIAGNOSIS — M79.10 MYALGIA: ICD-10-CM

## 2021-12-14 DIAGNOSIS — Z78.0 POSTMENOPAUSAL STATE: ICD-10-CM

## 2021-12-14 DIAGNOSIS — Z23 ENCOUNTER FOR IMMUNIZATION: ICD-10-CM

## 2021-12-14 DIAGNOSIS — E53.8 B12 DEFICIENCY: ICD-10-CM

## 2021-12-14 DIAGNOSIS — Z12.31 ENCOUNTER FOR SCREENING MAMMOGRAM FOR BREAST CANCER: ICD-10-CM

## 2021-12-14 DIAGNOSIS — Z12.31 SCREENING MAMMOGRAM FOR BREAST CANCER: ICD-10-CM

## 2021-12-14 DIAGNOSIS — E55.9 VITAMIN D DEFICIENCY: ICD-10-CM

## 2021-12-14 DIAGNOSIS — R53.83 FATIGUE, UNSPECIFIED TYPE: ICD-10-CM

## 2021-12-14 DIAGNOSIS — Z01.419 WELL WOMAN EXAM: Primary | ICD-10-CM

## 2021-12-14 DIAGNOSIS — N95.1 VAGINAL DRYNESS, MENOPAUSAL: ICD-10-CM

## 2021-12-14 DIAGNOSIS — I10 ESSENTIAL (PRIMARY) HYPERTENSION: ICD-10-CM

## 2021-12-14 DIAGNOSIS — Z72.0 TOBACCO ABUSE: ICD-10-CM

## 2021-12-14 LAB
25(OH)D3 SERPL-MCNC: 78.4 NG/ML (ref 30–100)
ALBUMIN SERPL-MCNC: 4.6 G/DL (ref 3.5–5.2)
ALBUMIN/GLOB SERPL: 1.6 G/DL
ALP SERPL-CCNC: 91 U/L (ref 39–117)
ALT SERPL W P-5'-P-CCNC: 24 U/L (ref 1–33)
ANION GAP SERPL CALCULATED.3IONS-SCNC: 10.8 MMOL/L (ref 5–15)
AST SERPL-CCNC: 20 U/L (ref 1–32)
BILIRUB BLD-MCNC: NEGATIVE MG/DL
BILIRUB SERPL-MCNC: 1 MG/DL (ref 0–1.2)
BUN SERPL-MCNC: 12 MG/DL (ref 6–20)
BUN/CREAT SERPL: 12.2 (ref 7–25)
CALCIUM SPEC-SCNC: 10.1 MG/DL (ref 8.6–10.5)
CHLORIDE SERPL-SCNC: 98 MMOL/L (ref 98–107)
CHOLEST SERPL-MCNC: 183 MG/DL (ref 0–200)
CLARITY, POC: CLEAR
CO2 SERPL-SCNC: 29.2 MMOL/L (ref 22–29)
COLOR UR: YELLOW
CREAT SERPL-MCNC: 0.98 MG/DL (ref 0.57–1)
DEPRECATED RDW RBC AUTO: 43.4 FL (ref 37–54)
DEVELOPER EXPIRATION DATE: NORMAL
DEVELOPER LOT NUMBER: NORMAL
ERYTHROCYTE [DISTWIDTH] IN BLOOD BY AUTOMATED COUNT: 12.8 % (ref 12.3–15.4)
EXPIRATION DATE: ABNORMAL
EXPIRATION DATE: NORMAL
FECAL OCCULT BLOOD SCREEN, POC: NEGATIVE
GFR SERPL CREATININE-BSD FRML MDRD: 60 ML/MIN/1.73
GLOBULIN UR ELPH-MCNC: 2.8 GM/DL
GLUCOSE SERPL-MCNC: 122 MG/DL (ref 65–99)
GLUCOSE UR STRIP-MCNC: NEGATIVE MG/DL
HBA1C MFR BLD: 7.2 % (ref 4.8–5.6)
HCT VFR BLD AUTO: 48.1 % (ref 34–46.6)
HCV AB SER DONR QL: NORMAL
HDLC SERPL-MCNC: 41 MG/DL (ref 40–60)
HGB BLD-MCNC: 16.5 G/DL (ref 12–15.9)
KETONES UR QL: NEGATIVE
LDLC SERPL CALC-MCNC: 97 MG/DL (ref 0–100)
LDLC/HDLC SERPL: 2.17 {RATIO}
LEUKOCYTE EST, POC: NEGATIVE
Lab: 1312
Lab: ABNORMAL
MAGNESIUM SERPL-MCNC: 1.5 MG/DL (ref 1.6–2.6)
MCH RBC QN AUTO: 31.8 PG (ref 26.6–33)
MCHC RBC AUTO-ENTMCNC: 34.3 G/DL (ref 31.5–35.7)
MCV RBC AUTO: 92.7 FL (ref 79–97)
NEGATIVE CONTROL: NEGATIVE
NITRITE UR-MCNC: NEGATIVE MG/ML
PH UR: 6 [PH] (ref 5–8)
PLATELET # BLD AUTO: 293 10*3/MM3 (ref 140–450)
PMV BLD AUTO: 9.7 FL (ref 6–12)
POSITIVE CONTROL: POSITIVE
POTASSIUM SERPL-SCNC: 4.4 MMOL/L (ref 3.5–5.2)
PROT SERPL-MCNC: 7.4 G/DL (ref 6–8.5)
PROT UR STRIP-MCNC: ABNORMAL MG/DL
RBC # BLD AUTO: 5.19 10*6/MM3 (ref 3.77–5.28)
RBC # UR STRIP: NEGATIVE /UL
SODIUM SERPL-SCNC: 138 MMOL/L (ref 136–145)
SP GR UR: 1.01 (ref 1–1.03)
T4 FREE SERPL-MCNC: 1.09 NG/DL (ref 0.93–1.7)
TRIGL SERPL-MCNC: 266 MG/DL (ref 0–150)
TSH SERPL DL<=0.05 MIU/L-ACNC: 1.89 UIU/ML (ref 0.27–4.2)
UROBILINOGEN UR QL: NORMAL
VIT B12 BLD-MCNC: 1105 PG/ML (ref 211–946)
VLDLC SERPL-MCNC: 45 MG/DL (ref 5–40)
WBC NRBC COR # BLD: 11.67 10*3/MM3 (ref 3.4–10.8)

## 2021-12-14 PROCEDURE — 82306 VITAMIN D 25 HYDROXY: CPT

## 2021-12-14 PROCEDURE — 86803 HEPATITIS C AB TEST: CPT

## 2021-12-14 PROCEDURE — 82607 VITAMIN B-12: CPT

## 2021-12-14 PROCEDURE — 82270 OCCULT BLOOD FECES: CPT | Performed by: FAMILY MEDICINE

## 2021-12-14 PROCEDURE — 99213 OFFICE O/P EST LOW 20 MIN: CPT | Performed by: FAMILY MEDICINE

## 2021-12-14 PROCEDURE — 82570 ASSAY OF URINE CREATININE: CPT

## 2021-12-14 PROCEDURE — 84443 ASSAY THYROID STIM HORMONE: CPT

## 2021-12-14 PROCEDURE — 83735 ASSAY OF MAGNESIUM: CPT

## 2021-12-14 PROCEDURE — 82043 UR ALBUMIN QUANTITATIVE: CPT

## 2021-12-14 PROCEDURE — 99396 PREV VISIT EST AGE 40-64: CPT | Performed by: FAMILY MEDICINE

## 2021-12-14 PROCEDURE — 85027 COMPLETE CBC AUTOMATED: CPT

## 2021-12-14 PROCEDURE — 80053 COMPREHEN METABOLIC PANEL: CPT

## 2021-12-14 PROCEDURE — 36415 COLL VENOUS BLD VENIPUNCTURE: CPT

## 2021-12-14 PROCEDURE — 84439 ASSAY OF FREE THYROXINE: CPT

## 2021-12-14 PROCEDURE — 80061 LIPID PANEL: CPT

## 2021-12-14 PROCEDURE — 83036 HEMOGLOBIN GLYCOSYLATED A1C: CPT

## 2021-12-14 PROCEDURE — 81003 URINALYSIS AUTO W/O SCOPE: CPT | Performed by: FAMILY MEDICINE

## 2021-12-14 NOTE — PROGRESS NOTES
Caitlin Escalera presents to [facility].    Chief Complaint: Well woman exam    Subjective       History of Present Illness:  HPI     Today is Lesa WWCANDICE.  Last PAP was years ago, she is s/p hysterectomy.  She thinks she has gone thru menopause.  Last mammogram was normal and 1 year ago. Last Dexa ? And it ws normal.  No sexual concerns. Libido is low.  She has vaginal dryness. Last colonoscopy-never.    Immunizations:     Td adult 4/7/2011    Hep B (adult dose) 4/7/2011    Hep B (adult dose) 5/17/2011    Hep B (adult dose) 10/22/2012    MMR  (Measles-Mumps-Rubella), live 4/7/2011    Fluzone (3 + years dose) 9/1/2017      Caitlin has a rapid HR and she is doing very well on metoprolol.  Denies SOA and dizzy spells, or chest pain.       Patient presents with type 2 diabetes mellitus without complications.  Specifically, this is type 2, non-insulin requiring diabetes.  She reports home blood glucose readings have been excellent, with average fasting glucoses running <120 mg/dL.   She is newly on Ozempic for 6 weeks and has lost 15 pounds and her diarrhea and loose stools have improved dramatically since decreasing the Metformin to 2 pills daily.  She feels great and is really happy            Review of Systems:  Review of Systems   Constitutional: Negative for chills, fatigue and fever.   HENT: Negative for ear pain, sinus pressure and sore throat.    Eyes: Negative for blurred vision and double vision.   Respiratory: Negative for cough, shortness of breath and wheezing.    Cardiovascular: Negative for chest pain and palpitations.   Gastrointestinal: Negative for abdominal pain, blood in stool, constipation, diarrhea, nausea and vomiting.   Skin: Negative for rash.   Neurological: Negative for dizziness and headache.   Psychiatric/Behavioral: Negative for depressed mood.        Objective   Medical History:  Past Medical History:   • Cyst of kidney, acquired   • Dyshidrosis   • Dysthymic  disorder   • Elevated white blood cell count   • Essential (primary) hypertension   • Heartburn   • Hidradenitis   • HLD (hyperlipidemia)   • Low back pain   • Nicotine dependence, unspecified, uncomplicated   • Other specified menopausal and perimenopausal disorders   • Overweight   • Personal history of other diseases of urinary system   • Proteinuria   • Solitary kidney   • Type 2 diabetes mellitus without complication (HCC)     Past Surgical History:   • CHOLECYSTECTOMY   • HYSTERECTOMY    In 30s   • TUBAL ABDOMINAL LIGATION      Family History   Problem Relation Age of Onset   • Hypothyroidism Mother    • Diabetes type II Father    • Heart failure Father    • Cancer Brother         mantel cell cancer     Social History     Tobacco Use   • Smoking status: Current Every Day Smoker     Packs/day: 1.00     Years: 30.00     Pack years: 30.00     Types: Cigarettes   • Smokeless tobacco: Never Used   • Tobacco comment: checking with insurance for coverage for medication assistance    Substance Use Topics   • Alcohol use: Not Currently       Health Maintenance Due   Topic Date Due   • COVID-19 Vaccine (1) Never done   • TDAP/TD VACCINES (3 - Tdap) 04/07/2021   • HEPATITIS C SCREENING  Never done        Immunization History   Administered Date(s) Administered   • Flu Vaccine Quad PF >18YRS 09/01/2017   • Hepatitis B 04/07/2011, 05/17/2011, 10/22/2012   • MMR 04/07/2011   • Td 04/07/2011       Allergies   Allergen Reactions   • Demerol [Meperidine] Unknown - High Severity   • Lisinopril Unknown - Low Severity   • Morphine Unknown - High Severity        Medications:  Current Outpatient Medications on File Prior to Visit   Medication Sig   • Accu-Chek Guide test strip 1 each by Other route 3 (Three) Times a Day. use to check glucose 3 times daily   • Accu-Chek Softclix Lancets lancets USE 1 UNIT TO CHECK GLUCOSE ONCE DAILY   • atorvastatin (LIPITOR) 40 MG tablet Take 1 tablet by mouth Daily. Discussed to cut in half and  "adjust to recommended dose based on symptoms.  Take as often as tolerated   • Blood Glucose Monitoring Suppl (Accu-Chek Guide) w/Device kit USE UNIT TO CHECK GLUCOSE THREE TIMES DAILY   • candesartan (ATACAND) 16 MG tablet TAKE 1 TABLET DAILY   • Cholecalciferol (Vitamin D3) 250 MCG (76224 UT) tablet Take  by mouth.   • clindamycin (CLINDAGEL) 1 % gel APPLY TOPICALLY IN THE MORNING AND NIGHT   • fluticasone (Flonase) 50 MCG/ACT nasal spray 2 sprays into the nostril(s) as directed by provider Daily.   • metFORMIN (GLUCOPHAGE) 500 MG tablet Take 2 tablets by mouth 2 (Two) Times a Day With Meals.   • minocycline (MINOCIN,DYNACIN) 100 MG capsule TAKE 1 CAPSULE TWICE DAILY   • omeprazole (priLOSEC) 20 MG capsule Take 1 capsule by mouth Daily.   • Probiotic Product (PROBIOTIC PO) Take  by mouth Daily.   • promethazine (PHENERGAN) 12.5 MG tablet Take 1 tablet by mouth Every 6 (Six) Hours As Needed for Nausea or Vomiting.   • saccharomyces boulardii (Florastor) 250 MG capsule Take 1 capsule by mouth 2 (Two) Times a Day.   • Semaglutide,0.25 or 0.5MG/DOS, (Ozempic, 0.25 or 0.5 MG/DOSE,) 2 MG/1.5ML solution pen-injector Inject 0.5 mg under the skin into the appropriate area as directed 1 (One) Time Per Week.   • venlafaxine XR (EFFEXOR-XR) 75 MG 24 hr capsule Take 1 capsule by mouth Daily for 180 days.     No current facility-administered medications on file prior to visit.       Vital Signs:   /79 (BP Location: Right arm, Patient Position: Sitting, Cuff Size: Large Adult)   Pulse 94   Temp 98.2 °F (36.8 °C) (Oral)   Ht 158.1 cm (62.24\")   Wt 79.8 kg (176 lb)   SpO2 97%   BMI 31.94 kg/m²       Physical Exam:  Physical Exam  Vitals and nursing note reviewed.   Constitutional:       General: She is not in acute distress.     Appearance: Normal appearance. She is not ill-appearing, toxic-appearing or diaphoretic.   HENT:      Head: Normocephalic and atraumatic.      Right Ear: Tympanic membrane, ear canal and " external ear normal.      Left Ear: Tympanic membrane, ear canal and external ear normal.      Nose: No congestion or rhinorrhea.      Mouth/Throat:      Pharynx: Oropharynx is clear. No oropharyngeal exudate or posterior oropharyngeal erythema.   Eyes:      Extraocular Movements: Extraocular movements intact.      Conjunctiva/sclera: Conjunctivae normal.   Cardiovascular:      Rate and Rhythm: Normal rate and regular rhythm.      Heart sounds: Normal heart sounds.   Pulmonary:      Breath sounds: Normal breath sounds. No wheezing, rhonchi or rales.   Chest:   Breasts:      Right: Normal.       Abdominal:      General: Abdomen is flat.      Palpations: Abdomen is soft.      Hernia: There is no hernia in the left inguinal area or right inguinal area.   Genitourinary:     General: Normal vulva.      Labia:         Right: No rash, tenderness, lesion or injury.         Left: No rash, tenderness, lesion or injury.       Urethra: No prolapse, urethral pain, urethral swelling or urethral lesion.      Vagina: Normal.      Uterus: Absent.       Adnexa: Right adnexa normal and left adnexa normal.      Rectum: Normal.   Musculoskeletal:      Cervical back: Neck supple. No rigidity.   Lymphadenopathy:      Cervical: No cervical adenopathy.      Lower Body: No right inguinal adenopathy. No left inguinal adenopathy.   Skin:     General: Skin is warm and dry.   Neurological:      Mental Status: She is alert and oriented to person, place, and time.   Psychiatric:         Mood and Affect: Mood normal.         Behavior: Behavior normal.         Result Review      The following data was reviewed by Rosalina Woodard MD on 12/14/2021.  Lab Results   Component Value Date    WBC 8.76 06/23/2021    HGB 15.5 06/23/2021    HCT 43.5 06/23/2021    MCV 93.8 06/23/2021     06/23/2021     Lab Results   Component Value Date    GLUCOSE 118 (H) 06/23/2021    BUN 13 06/23/2021    CREATININE 0.86 06/23/2021    EGFRIFNONA 70 06/23/2021    BCR  15.1 06/23/2021    K 4.6 06/23/2021    CO2 25.4 06/23/2021    CALCIUM 9.5 06/23/2021    ALBUMIN 4.50 06/23/2021    LABIL2 1.6 01/11/2021    AST 25 06/23/2021    ALT 37 (H) 06/23/2021     Lab Results   Component Value Date    CHOL 233 (H) 06/23/2021    CHLPL 197 01/11/2021    TRIG 319 (H) 06/23/2021    HDL 43 06/23/2021     (H) 06/23/2021     Lab Results   Component Value Date    TSH 1.720 06/23/2021     Lab Results   Component Value Date    HGBA1C 6.90 (H) 06/23/2021     No results found for: PSA                    Assessment and Plan:          Diagnoses and all orders for this visit:    1. Well woman exam (Primary)  Comments:  Status post hysterectomy, no Pap performed.  Pelvic exam wnl.  Breast exam within normal limits.  Recommend Tdap/Covid/Shingrix/flu vaccination-patient defers  Orders:  -     POCT urinalysis dipstick, automated  -     Cancel: PAP, Liquid Based ([facility] Only); Future  -     PAP, Liquid Based ([facility] Only)    2. Encounter for screening mammogram for breast cancer  Comments:  Mammogram set up for today    3. Postmenopausal state  Comments:  DEXA ordered  Orders:  -     DEXA Bone Density Axial; Future    4. Colon cancer screening  Comments:  Colonoscopy ordered  Orders:  -     Ambulatory Referral For Screening Colonoscopy  -     POC Occult Blood Stool    5. Encounter for screening for other viral diseases  -     Hepatitis C antibody; Future    6. Type 2 diabetes mellitus without complication, without long-term current use of insulin (HCC)  Comments:  She is doing awesome on Ozempic.  Due for labs    7. Essential (primary) hypertension  Comments:  Stable and well-controlled    8. Tobacco abuse    9. Encounter for immunization  -     Cancel: Tdap Vaccine Greater Than or Equal To 8yo IM    10. Vaginal dryness, menopausal  Comments:  We will start her on estrogen vaginal  Orders:  -     Discontinue: conjugated estrogens (PREMARIN) vaginal cream 0.5 g  -     conjugated estrogens (PREMARIN)  vaginal cream 0.5 g    11. Hydradenitis  Comments:  under breast B, start topical hibclens          Follow Up   Return in about 6 months (around 6/14/2022) for Recheck.

## 2021-12-15 LAB
ALBUMIN UR-MCNC: 77.6 MG/DL
CREAT UR-MCNC: 55.6 MG/DL
MICROALBUMIN/CREAT UR: 1395.7 MG/G

## 2022-01-03 ENCOUNTER — APPOINTMENT (OUTPATIENT)
Dept: BONE DENSITY | Facility: HOSPITAL | Age: 52
End: 2022-01-03

## 2022-03-10 RX ORDER — CANDESARTAN 16 MG/1
16 TABLET ORAL DAILY
Qty: 90 TABLET | Refills: 0 | Status: SHIPPED | OUTPATIENT
Start: 2022-03-10 | End: 2022-03-24 | Stop reason: SDUPTHER

## 2022-03-24 ENCOUNTER — LAB (OUTPATIENT)
Dept: LAB | Facility: HOSPITAL | Age: 52
End: 2022-03-24

## 2022-03-24 ENCOUNTER — OFFICE VISIT (OUTPATIENT)
Dept: FAMILY MEDICINE CLINIC | Age: 52
End: 2022-03-24

## 2022-03-24 VITALS
TEMPERATURE: 98.6 F | BODY MASS INDEX: 29.96 KG/M2 | HEART RATE: 81 BPM | WEIGHT: 162.8 LBS | HEIGHT: 62 IN | OXYGEN SATURATION: 99 % | SYSTOLIC BLOOD PRESSURE: 124 MMHG | DIASTOLIC BLOOD PRESSURE: 96 MMHG

## 2022-03-24 DIAGNOSIS — E66.3 OVERWEIGHT: ICD-10-CM

## 2022-03-24 DIAGNOSIS — E78.2 MIXED HYPERLIPIDEMIA: ICD-10-CM

## 2022-03-24 DIAGNOSIS — Z78.0 MENOPAUSE: ICD-10-CM

## 2022-03-24 DIAGNOSIS — N95.2 VAGINAL ATROPHY: ICD-10-CM

## 2022-03-24 DIAGNOSIS — Z72.0 TOBACCO ABUSE: ICD-10-CM

## 2022-03-24 DIAGNOSIS — E55.9 VITAMIN D DEFICIENCY: ICD-10-CM

## 2022-03-24 DIAGNOSIS — E11.9 TYPE 2 DIABETES MELLITUS WITHOUT COMPLICATION, WITHOUT LONG-TERM CURRENT USE OF INSULIN: ICD-10-CM

## 2022-03-24 DIAGNOSIS — E11.9 TYPE 2 DIABETES MELLITUS WITHOUT COMPLICATION, WITHOUT LONG-TERM CURRENT USE OF INSULIN: Primary | ICD-10-CM

## 2022-03-24 DIAGNOSIS — R12 HEARTBURN: ICD-10-CM

## 2022-03-24 DIAGNOSIS — N28.1 CYST OF KIDNEY, ACQUIRED: ICD-10-CM

## 2022-03-24 LAB
CHOLEST SERPL-MCNC: 185 MG/DL (ref 0–200)
DEPRECATED RDW RBC AUTO: 42.7 FL (ref 37–54)
ERYTHROCYTE [DISTWIDTH] IN BLOOD BY AUTOMATED COUNT: 12.7 % (ref 12.3–15.4)
HBA1C MFR BLD: 6.6 % (ref 4.8–5.6)
HCT VFR BLD AUTO: 47.5 % (ref 34–46.6)
HDLC SERPL-MCNC: 39 MG/DL (ref 40–60)
HGB BLD-MCNC: 16.5 G/DL (ref 12–15.9)
LDLC SERPL CALC-MCNC: 106 MG/DL (ref 0–100)
LDLC/HDLC SERPL: 2.56 {RATIO}
MCH RBC QN AUTO: 32.4 PG (ref 26.6–33)
MCHC RBC AUTO-ENTMCNC: 34.7 G/DL (ref 31.5–35.7)
MCV RBC AUTO: 93.1 FL (ref 79–97)
PLATELET # BLD AUTO: 286 10*3/MM3 (ref 140–450)
PMV BLD AUTO: 9.2 FL (ref 6–12)
RBC # BLD AUTO: 5.1 10*6/MM3 (ref 3.77–5.28)
TRIGL SERPL-MCNC: 231 MG/DL (ref 0–150)
VLDLC SERPL-MCNC: 40 MG/DL (ref 5–40)
WBC NRBC COR # BLD: 12.51 10*3/MM3 (ref 3.4–10.8)

## 2022-03-24 PROCEDURE — 83036 HEMOGLOBIN GLYCOSYLATED A1C: CPT

## 2022-03-24 PROCEDURE — 85027 COMPLETE CBC AUTOMATED: CPT

## 2022-03-24 PROCEDURE — 99214 OFFICE O/P EST MOD 30 MIN: CPT | Performed by: FAMILY MEDICINE

## 2022-03-24 PROCEDURE — 36415 COLL VENOUS BLD VENIPUNCTURE: CPT

## 2022-03-24 PROCEDURE — 80061 LIPID PANEL: CPT

## 2022-03-24 RX ORDER — ESTRADIOL 0.1 MG/G
0.5 CREAM VAGINAL 2 TIMES WEEKLY
Qty: 42.5 G | Refills: 5 | Status: SHIPPED | OUTPATIENT
Start: 2022-03-24 | End: 2022-08-30 | Stop reason: SDUPTHER

## 2022-03-24 RX ORDER — CANDESARTAN 16 MG/1
16 TABLET ORAL DAILY
Qty: 90 TABLET | Refills: 0 | Status: SHIPPED | OUTPATIENT
Start: 2022-03-24 | End: 2022-04-25 | Stop reason: SDUPTHER

## 2022-03-24 RX ORDER — OMEPRAZOLE 40 MG/1
40 CAPSULE, DELAYED RELEASE ORAL DAILY
Qty: 30 CAPSULE | Refills: 5 | Status: SHIPPED | OUTPATIENT
Start: 2022-03-24 | End: 2022-04-25 | Stop reason: SDUPTHER

## 2022-03-24 NOTE — PROGRESS NOTES
Caitlin Escalera presents to [facility].    Chief Complaint: diabetes follow up    Subjective       History of Present Illness:  HPI   Patient presents with type 2 diabetes mellitus without complications. She reports home blood glucose readings have been excellent, with average fasting glucoses running <120 mg/dL.   She stopped themetformin and it is causing diarrhea.  She is on ozempic, tolerating med well, has lost over 25#'s and her BS are incredibly good, last eye exam was 8 months ago-h/o L eye retinal bleed.  She is exercising a little              Additionally, she presents with history of mixed hyperlipidemia, she is on atorvastatin 40 mg daily, tolerates med well, labs reviewed    No h/o hypertension , she is on candesartan daily for a cyst in her only kidney (left)     She is off vitamin D daily     Chronic GERD and she is worse with nighttime heartburn pain, she is  on omeprazole 20 mg nightly     She is menopausal and on effexor  37.5 mg and this helps her dysthmic disorder. She has vaginal atrophy and needs the estrogen cream filled.     Review of Systems:  Review of Systems   Constitutional: Negative for chills, fatigue and fever.   HENT: Negative for ear pain, sinus pressure and sore throat.    Eyes: Negative for blurred vision and double vision.   Respiratory: Negative for cough, shortness of breath and wheezing.    Cardiovascular: Negative for chest pain and palpitations.   Gastrointestinal: Positive for GERD. Negative for abdominal pain, blood in stool, constipation, diarrhea, nausea and vomiting.   Skin: Negative for rash.   Neurological: Negative for dizziness and headache.   Psychiatric/Behavioral: Negative for depressed mood.        Objective   Medical History:  Past Medical History:   • Cyst of kidney, acquired   • Dyshidrosis   • Dysthymic disorder   • Elevated white blood cell count   • Essential (primary) hypertension   • Heartburn   • Hidradenitis   • HLD  (hyperlipidemia)   • Low back pain   • Nicotine dependence, unspecified, uncomplicated   • Other specified menopausal and perimenopausal disorders   • Overweight   • Personal history of other diseases of urinary system   • Proteinuria   • Solitary kidney   • Type 2 diabetes mellitus without complication (HCC)     Past Surgical History:   • CHOLECYSTECTOMY   • HYSTERECTOMY    In 30s   • TUBAL ABDOMINAL LIGATION      Family History   Problem Relation Age of Onset   • Hypothyroidism Mother    • Diabetes type II Father    • Heart failure Father    • Cancer Brother         mantel cell cancer     Social History     Tobacco Use   • Smoking status: Current Every Day Smoker     Packs/day: 1.00     Years: 30.00     Pack years: 30.00     Types: Cigarettes   • Smokeless tobacco: Never Used   • Tobacco comment: checking with insurance for coverage for medication assistance    Substance Use Topics   • Alcohol use: Not Currently       Health Maintenance Due   Topic Date Due   • COVID-19 Vaccine (1) Never done   • ZOSTER VACCINE (1 of 2) Never done   • TDAP/TD VACCINES (3 - Tdap) 04/07/2021        Immunization History   Administered Date(s) Administered   • Flu Vaccine Quad PF >18YRS 09/01/2017   • Hepatitis B 04/07/2011, 05/17/2011, 10/22/2012   • MMR 04/07/2011   • Td 04/07/2011       Allergies   Allergen Reactions   • Demerol [Meperidine] Unknown - High Severity   • Lisinopril Unknown - Low Severity   • Morphine Unknown - High Severity        Medications:  Current Outpatient Medications on File Prior to Visit   Medication Sig   • Accu-Chek Guide test strip 1 each by Other route 3 (Three) Times a Day. use to check glucose 3 times daily   • Accu-Chek Softclix Lancets lancets USE 1 UNIT TO CHECK GLUCOSE ONCE DAILY   • atorvastatin (LIPITOR) 40 MG tablet Take 1 tablet by mouth Daily. Discussed to cut in half and adjust to recommended dose based on symptoms.  Take as often as tolerated   • Blood Glucose Monitoring Suppl (Accu-Chek  "Guide) w/Device kit USE UNIT TO CHECK GLUCOSE THREE TIMES DAILY   • clindamycin (CLINDAGEL) 1 % gel APPLY TOPICALLY IN THE MORNING AND NIGHT   • fluticasone (Flonase) 50 MCG/ACT nasal spray 2 sprays into the nostril(s) as directed by provider Daily.   • metFORMIN (GLUCOPHAGE) 500 MG tablet Take 2 tablets by mouth 2 (Two) Times a Day With Meals.   • minocycline (MINOCIN,DYNACIN) 100 MG capsule TAKE 1 CAPSULE TWICE DAILY   • promethazine (PHENERGAN) 12.5 MG tablet Take 1 tablet by mouth Every 6 (Six) Hours As Needed for Nausea or Vomiting.   • saccharomyces boulardii (Florastor) 250 MG capsule Take 1 capsule by mouth 2 (Two) Times a Day.   • Semaglutide,0.25 or 0.5MG/DOS, (Ozempic, 0.25 or 0.5 MG/DOSE,) 2 MG/1.5ML solution pen-injector Inject 0.5 mg under the skin into the appropriate area as directed 1 (One) Time Per Week.   • venlafaxine XR (EFFEXOR-XR) 75 MG 24 hr capsule Take 1 capsule by mouth Daily for 180 days.   • [DISCONTINUED] candesartan (ATACAND) 16 MG tablet Take 1 tablet by mouth Daily.   • [DISCONTINUED] omeprazole (priLOSEC) 20 MG capsule Take 1 capsule by mouth Daily.   • Cholecalciferol (Vitamin D3) 250 MCG (73851 UT) tablet Take  by mouth.   • Probiotic Product (PROBIOTIC PO) Take  by mouth Daily.     Current Facility-Administered Medications on File Prior to Visit   Medication   • [DISCONTINUED] conjugated estrogens (PREMARIN) vaginal cream 0.5 g       Vital Signs:   /96 (BP Location: Right arm, Patient Position: Sitting, Cuff Size: Adult)   Pulse 81   Temp 98.6 °F (37 °C) (Oral)   Ht 158.1 cm (62.24\")   Wt 73.8 kg (162 lb 12.8 oz)   SpO2 99%   BMI 29.55 kg/m²       Physical Exam:  Physical Exam  Vitals and nursing note reviewed.   Constitutional:       General: She is not in acute distress.     Appearance: Normal appearance. She is not ill-appearing, toxic-appearing or diaphoretic.   HENT:      Head: Normocephalic and atraumatic.      Right Ear: Tympanic membrane, ear canal and " external ear normal.      Left Ear: Tympanic membrane, ear canal and external ear normal.      Nose: No congestion or rhinorrhea.      Mouth/Throat:      Mouth: Mucous membranes are moist.      Pharynx: Oropharynx is clear. No oropharyngeal exudate or posterior oropharyngeal erythema.   Eyes:      Extraocular Movements: Extraocular movements intact.      Conjunctiva/sclera: Conjunctivae normal.      Pupils: Pupils are equal, round, and reactive to light.   Cardiovascular:      Rate and Rhythm: Normal rate and regular rhythm.      Heart sounds: Normal heart sounds.   Pulmonary:      Effort: Pulmonary effort is normal.      Breath sounds: Normal breath sounds. No wheezing, rhonchi or rales.   Abdominal:      General: Abdomen is flat.      Palpations: Abdomen is soft.   Musculoskeletal:      Cervical back: Neck supple. No rigidity.   Lymphadenopathy:      Cervical: No cervical adenopathy.   Skin:     General: Skin is warm and dry.   Neurological:      Mental Status: She is alert and oriented to person, place, and time.   Psychiatric:         Mood and Affect: Mood normal.         Behavior: Behavior normal.         Result Review      The following data was reviewed by Rosalina Woodard MD on 03/24/2022.  Lab Results   Component Value Date    WBC 11.67 (H) 12/14/2021    HGB 16.5 (H) 12/14/2021    HCT 48.1 (H) 12/14/2021    MCV 92.7 12/14/2021     12/14/2021     Lab Results   Component Value Date    GLUCOSE 122 (H) 12/14/2021    BUN 12 12/14/2021    CREATININE 0.98 12/14/2021    EGFRIFNONA 60 (L) 12/14/2021    BCR 12.2 12/14/2021    K 4.4 12/14/2021    CO2 29.2 (H) 12/14/2021    CALCIUM 10.1 12/14/2021    ALBUMIN 4.60 12/14/2021    LABIL2 1.6 01/11/2021    AST 20 12/14/2021    ALT 24 12/14/2021     Lab Results   Component Value Date    CHOL 183 12/14/2021    CHLPL 197 01/11/2021    TRIG 266 (H) 12/14/2021    HDL 41 12/14/2021    LDL 97 12/14/2021     Lab Results   Component Value Date    TSH 1.890 12/14/2021      Lab Results   Component Value Date    HGBA1C 7.20 (H) 12/14/2021     No results found for: PSA                    Assessment and Plan:          Diagnoses and all orders for this visit:    1. Type 2 diabetes mellitus without complication, without long-term current use of insulin (HCC) (Primary)  -     CBC (No Diff); Future  -     Lipid Panel; Future  -     Hemoglobin A1c; Future    2. Vitamin D deficiency    3. Mixed hyperlipidemia    4. Menopause    5. Tobacco abuse  Comments:  she defers quitting today, counseled on quitting and [email or web address]    6. Vaginal atrophy  Comments:  will start her on vaginal estrogen cream  Orders:  -     estradiol (ESTRACE) 0.1 MG/GM vaginal cream; Insert 0.5 g into the vagina 2 (Two) Times a Week.  Dispense: 42.5 g; Refill: 5    7. Heartburn  -     omeprazole (priLOSEC) 40 MG capsule; Take 1 capsule by mouth Daily.  Dispense: 30 capsule; Refill: 5    8. Overweight    9. Cyst of kidney, acquired  -     candesartan (ATACAND) 16 MG tablet; Take 1 tablet by mouth Daily.  Dispense: 90 tablet; Refill: 0          Follow Up   Return in about 6 months (around 9/24/2022) for Recheck.

## 2022-04-05 RX ORDER — ATORVASTATIN CALCIUM 40 MG/1
TABLET, FILM COATED ORAL
Qty: 90 TABLET | Refills: 1 | Status: SHIPPED | OUTPATIENT
Start: 2022-04-05 | End: 2022-04-25 | Stop reason: SDUPTHER

## 2022-04-25 ENCOUNTER — PATIENT MESSAGE (OUTPATIENT)
Dept: FAMILY MEDICINE CLINIC | Age: 52
End: 2022-04-25

## 2022-04-25 ENCOUNTER — TELEPHONE (OUTPATIENT)
Dept: FAMILY MEDICINE CLINIC | Age: 52
End: 2022-04-25

## 2022-04-25 DIAGNOSIS — R12 HEARTBURN: ICD-10-CM

## 2022-04-25 DIAGNOSIS — K21.9 GASTROESOPHAGEAL REFLUX DISEASE WITHOUT ESOPHAGITIS: ICD-10-CM

## 2022-04-25 DIAGNOSIS — E11.9 TYPE 2 DIABETES MELLITUS WITHOUT COMPLICATION, WITHOUT LONG-TERM CURRENT USE OF INSULIN: ICD-10-CM

## 2022-04-25 DIAGNOSIS — E78.2 MIXED HYPERLIPIDEMIA: Primary | ICD-10-CM

## 2022-04-25 DIAGNOSIS — N28.1 CYST OF KIDNEY, ACQUIRED: ICD-10-CM

## 2022-04-25 RX ORDER — OMEPRAZOLE 40 MG/1
40 CAPSULE, DELAYED RELEASE ORAL DAILY
Qty: 30 CAPSULE | Refills: 5 | Status: CANCELLED | OUTPATIENT
Start: 2022-04-25

## 2022-04-25 RX ORDER — SEMAGLUTIDE 1.34 MG/ML
0.5 INJECTION, SOLUTION SUBCUTANEOUS WEEKLY
Qty: 1 PEN | Refills: 5 | Status: CANCELLED | OUTPATIENT
Start: 2022-04-25

## 2022-04-25 RX ORDER — ATORVASTATIN CALCIUM 40 MG/1
40 TABLET, FILM COATED ORAL NIGHTLY
Qty: 90 TABLET | Refills: 1 | Status: SHIPPED | OUTPATIENT
Start: 2022-04-25 | End: 2022-07-14 | Stop reason: SDUPTHER

## 2022-04-25 RX ORDER — SEMAGLUTIDE 1.34 MG/ML
0.5 INJECTION, SOLUTION SUBCUTANEOUS WEEKLY
Qty: 1 PEN | Refills: 2 | Status: SHIPPED | OUTPATIENT
Start: 2022-04-25 | End: 2022-05-03

## 2022-04-25 RX ORDER — CANDESARTAN 16 MG/1
16 TABLET ORAL DAILY
Qty: 90 TABLET | Refills: 0 | Status: SHIPPED | OUTPATIENT
Start: 2022-04-25 | End: 2022-05-23

## 2022-04-25 RX ORDER — CANDESARTAN 16 MG/1
16 TABLET ORAL DAILY
Qty: 90 TABLET | Refills: 0 | Status: CANCELLED | OUTPATIENT
Start: 2022-04-25

## 2022-04-25 RX ORDER — ATORVASTATIN CALCIUM 40 MG/1
TABLET, FILM COATED ORAL
Qty: 90 TABLET | Refills: 1 | Status: CANCELLED | OUTPATIENT
Start: 2022-04-25

## 2022-04-25 RX ORDER — OMEPRAZOLE 40 MG/1
40 CAPSULE, DELAYED RELEASE ORAL DAILY
Qty: 30 CAPSULE | Refills: 5 | Status: SHIPPED | OUTPATIENT
Start: 2022-04-25 | End: 2022-06-13 | Stop reason: SDUPTHER

## 2022-04-25 NOTE — TELEPHONE ENCOUNTER
Caller: Caitlin Escalera GEMINI    Relationship: Self    Best call back number: [phone]    Requested Prescriptions:   Requested Prescriptions     Pending Prescriptions Disp Refills   • Semaglutide,0.25 or 0.5MG/DOS, (Ozempic, 0.25 or 0.5 MG/DOSE,) 2 MG/1.5ML solution pen-injector 1 pen 5     Sig: Inject 0.5 mg under the skin into the appropriate area as directed 1 (One) Time Per Week.   • atorvastatin (LIPITOR) 40 MG tablet 90 tablet 1   • candesartan (ATACAND) 16 MG tablet 90 tablet 0     Sig: Take 1 tablet by mouth Daily.   • omeprazole (priLOSEC) 40 MG capsule 30 capsule 5     Sig: Take 1 capsule by mouth Daily.        Pharmacy where request should be sent: [facility] [address] [place] [address] [address] [facility] [phone] [facility] [phone] FX       Does the patient have less than a 3 day supply:  [x] Yes  [] No    Nathan Newman Rep   04/25/22 09:39 EDT

## 2022-04-25 NOTE — TELEPHONE ENCOUNTER
Medications refilled.  Make sure that she has an appointment to follow-up with me 6 months from her last visit.  Dr. Woodard

## 2022-05-03 DIAGNOSIS — E11.9 TYPE 2 DIABETES MELLITUS WITHOUT COMPLICATION, WITHOUT LONG-TERM CURRENT USE OF INSULIN: ICD-10-CM

## 2022-05-03 RX ORDER — SEMAGLUTIDE 1.34 MG/ML
INJECTION, SOLUTION SUBCUTANEOUS
Qty: 6 ML | Refills: 0 | Status: SHIPPED | OUTPATIENT
Start: 2022-05-03 | End: 2022-07-14 | Stop reason: SDUPTHER

## 2022-05-22 DIAGNOSIS — N28.1 CYST OF KIDNEY, ACQUIRED: ICD-10-CM

## 2022-05-23 RX ORDER — CANDESARTAN 16 MG/1
TABLET ORAL
Qty: 30 TABLET | Refills: 0 | Status: SHIPPED | OUTPATIENT
Start: 2022-05-23 | End: 2022-08-30

## 2022-06-01 RX ORDER — CLINDAMYCIN PHOSPHATE 10 MG/G
GEL TOPICAL
Qty: 30 G | Refills: 2 | Status: SHIPPED | OUTPATIENT
Start: 2022-06-01

## 2022-06-13 DIAGNOSIS — K21.9 GASTROESOPHAGEAL REFLUX DISEASE WITHOUT ESOPHAGITIS: ICD-10-CM

## 2022-06-13 DIAGNOSIS — R12 HEARTBURN: ICD-10-CM

## 2022-06-13 DIAGNOSIS — E11.9 TYPE 2 DIABETES MELLITUS WITHOUT COMPLICATION, WITHOUT LONG-TERM CURRENT USE OF INSULIN: ICD-10-CM

## 2022-06-13 DIAGNOSIS — Z78.0 MENOPAUSE: ICD-10-CM

## 2022-06-13 NOTE — TELEPHONE ENCOUNTER
Please see these messages.    Pt was already advised  to schedule follow up appt with dr ag for sept. Reminded her of this. Also informed pt she can contact [facility] to have them have any remaining refills she has left, transferred.      clr    Patient Message with Mychart, Generic Provider (04/25/2022)

## 2022-06-16 RX ORDER — VENLAFAXINE HYDROCHLORIDE 75 MG/1
75 CAPSULE, EXTENDED RELEASE ORAL DAILY
Qty: 90 CAPSULE | Refills: 0 | Status: SHIPPED | OUTPATIENT
Start: 2022-06-16 | End: 2022-07-14 | Stop reason: SDUPTHER

## 2022-06-16 RX ORDER — OMEPRAZOLE 40 MG/1
40 CAPSULE, DELAYED RELEASE ORAL DAILY
Qty: 30 CAPSULE | Refills: 2 | Status: SHIPPED | OUTPATIENT
Start: 2022-06-16 | End: 2022-07-14 | Stop reason: SDUPTHER

## 2022-06-19 RX ORDER — MINOCYCLINE HYDROCHLORIDE 100 MG/1
100 CAPSULE ORAL 2 TIMES DAILY
Qty: 180 CAPSULE | Refills: 1 | Status: SHIPPED | OUTPATIENT
Start: 2022-06-19 | End: 2022-08-30

## 2022-07-14 DIAGNOSIS — Z78.0 MENOPAUSE: ICD-10-CM

## 2022-07-14 DIAGNOSIS — R12 HEARTBURN: ICD-10-CM

## 2022-07-14 DIAGNOSIS — K21.9 GASTROESOPHAGEAL REFLUX DISEASE WITHOUT ESOPHAGITIS: ICD-10-CM

## 2022-07-14 DIAGNOSIS — E78.2 MIXED HYPERLIPIDEMIA: ICD-10-CM

## 2022-07-14 DIAGNOSIS — E11.9 TYPE 2 DIABETES MELLITUS WITHOUT COMPLICATION, WITHOUT LONG-TERM CURRENT USE OF INSULIN: Chronic | ICD-10-CM

## 2022-07-14 DIAGNOSIS — E11.9 TYPE 2 DIABETES MELLITUS WITHOUT COMPLICATION, WITHOUT LONG-TERM CURRENT USE OF INSULIN: ICD-10-CM

## 2022-07-14 RX ORDER — OMEPRAZOLE 40 MG/1
40 CAPSULE, DELAYED RELEASE ORAL DAILY
Qty: 30 CAPSULE | Refills: 2 | Status: SHIPPED | OUTPATIENT
Start: 2022-07-14 | End: 2022-08-30 | Stop reason: SDUPTHER

## 2022-07-14 RX ORDER — VENLAFAXINE HYDROCHLORIDE 75 MG/1
75 CAPSULE, EXTENDED RELEASE ORAL DAILY
Qty: 90 CAPSULE | Refills: 0 | Status: SHIPPED | OUTPATIENT
Start: 2022-07-14 | End: 2022-08-30 | Stop reason: SDUPTHER

## 2022-07-14 RX ORDER — SEMAGLUTIDE 1.34 MG/ML
0.5 INJECTION, SOLUTION SUBCUTANEOUS WEEKLY
Qty: 6 ML | Refills: 0 | Status: SHIPPED | OUTPATIENT
Start: 2022-07-14 | End: 2022-08-30

## 2022-07-14 RX ORDER — ATORVASTATIN CALCIUM 40 MG/1
40 TABLET, FILM COATED ORAL NIGHTLY
Qty: 90 TABLET | Refills: 1 | Status: SHIPPED | OUTPATIENT
Start: 2022-07-14 | End: 2022-08-30 | Stop reason: SDUPTHER

## 2022-07-14 NOTE — TELEPHONE ENCOUNTER
Caller: Caitlin Escalera    Relationship: Self    Best call back number: [phone]    Requested Prescriptions:   Requested Prescriptions     Pending Prescriptions Disp Refills   • Semaglutide,0.25 or 0.5MG/DOS, (Ozempic, 0.25 or 0.5 MG/DOSE,) 2 MG/1.5ML solution pen-injector 6 mL 0   • metFORMIN (GLUCOPHAGE) 500 MG tablet 120 tablet 1     Sig: Take 2 tablets by mouth 2 (Two) Times a Day With Meals.   • omeprazole (priLOSEC) 40 MG capsule 30 capsule 2     Sig: Take 1 capsule by mouth Daily.   • venlafaxine XR (EFFEXOR-XR) 75 MG 24 hr capsule 90 capsule 0     Sig: Take 1 capsule by mouth Daily for 180 days.   • atorvastatin (LIPITOR) 40 MG tablet 90 tablet 1     Sig: Take 1 tablet by mouth Every Night.        Pharmacy where request should be sent: [facility] #[id number] - [place], [place] - [address] [facility] [phone] [facility] [phone] FX     Additional details provided by patient: PATIENT IS REQUESTING THAT ALL PRESCRIPTIONS BE SENT FOR 90 DAYS WHICH WILL GET HER THROUGH UNTIL HER APPOINTMENT ON 10/11/2022.  SHE IS MOST CONCERNED THAT THE OZEMPIC BE FOR THREE MONTHS DUE TO INSURANCE  COVERAGE    Does the patient have less than a 3 day supply:  [] Yes  [x] No    Nathan Shankar Rep   07/14/22 09:57 EDT

## 2022-07-31 DIAGNOSIS — E11.9 TYPE 2 DIABETES MELLITUS WITHOUT COMPLICATION, WITHOUT LONG-TERM CURRENT USE OF INSULIN: Chronic | ICD-10-CM

## 2022-08-04 ENCOUNTER — TELEPHONE (OUTPATIENT)
Dept: FAMILY MEDICINE CLINIC | Age: 52
End: 2022-08-04

## 2022-08-04 NOTE — TELEPHONE ENCOUNTER
----- Message from Rosalina Woodard MD sent at 8/4/2022  8:52 AM EDT -----  Regarding: FW: Caitlin Escalera age 52      ----- Message -----  From: Rossy Cruz LPN  Sent: 8/4/2022   8:49 AM EDT  To: Rosalina Woodard MD  Subject: FW: Caitlin Escalera age 52                           ----- Message -----  From: Caitlin Escalera  Sent: 8/3/2022   9:29 PM EDT  To: [facility]  Subject: Ciatlin Escalera age 52                             Dr. Woodard,  Can I take COQ10? I was told it would help me .so I just want your professional opinion.   I have researched it and it seams like it would benefit me and I could stop the candasartian that makes my back hurt always . Thank you for your time ,and always have a blessed day. Caitlin

## 2022-08-04 NOTE — TELEPHONE ENCOUNTER
----- Message from Rosalina Woodard MD sent at 8/4/2022  8:52 AM EDT -----  Regarding: FW: Caitlin Escalera age 52      ----- Message -----  From: Rossy Cruz LPN  Sent: 8/4/2022   8:49 AM EDT  To: Rosalina Woodard MD  Subject: FW: Caitlin Escalera age 52                           ----- Message -----  From: Caitlin Escalera  Sent: 8/3/2022   9:29 PM EDT  To: [facility]  Subject: Caitlin Escalera age 52                             Dr. Woodard,  Can I take COQ10? I was told it would help me .so I just want your professional opinion.   I have researched it and it seams like it would benefit me and I could stop the candasartian that makes my back hurt always . Thank you for your time ,and always have a blessed day. Caitlin

## 2022-08-30 ENCOUNTER — OFFICE VISIT (OUTPATIENT)
Dept: FAMILY MEDICINE CLINIC | Age: 52
End: 2022-08-30

## 2022-08-30 VITALS
OXYGEN SATURATION: 95 % | WEIGHT: 171 LBS | DIASTOLIC BLOOD PRESSURE: 76 MMHG | HEIGHT: 62 IN | SYSTOLIC BLOOD PRESSURE: 107 MMHG | HEART RATE: 87 BPM | BODY MASS INDEX: 31.47 KG/M2

## 2022-08-30 DIAGNOSIS — E55.9 VITAMIN D DEFICIENCY: ICD-10-CM

## 2022-08-30 DIAGNOSIS — E11.9 TYPE 2 DIABETES MELLITUS WITHOUT COMPLICATION, WITHOUT LONG-TERM CURRENT USE OF INSULIN: Primary | ICD-10-CM

## 2022-08-30 DIAGNOSIS — R12 HEARTBURN: ICD-10-CM

## 2022-08-30 DIAGNOSIS — Z78.0 MENOPAUSE: ICD-10-CM

## 2022-08-30 DIAGNOSIS — N95.2 VAGINAL ATROPHY: ICD-10-CM

## 2022-08-30 DIAGNOSIS — L73.2 HIDRADENITIS: ICD-10-CM

## 2022-08-30 DIAGNOSIS — E78.2 MIXED HYPERLIPIDEMIA: ICD-10-CM

## 2022-08-30 DIAGNOSIS — Z23 ENCOUNTER FOR IMMUNIZATION: ICD-10-CM

## 2022-08-30 DIAGNOSIS — N28.1 CYST OF KIDNEY, ACQUIRED: ICD-10-CM

## 2022-08-30 DIAGNOSIS — E66.3 OVERWEIGHT: ICD-10-CM

## 2022-08-30 PROCEDURE — 90471 IMMUNIZATION ADMIN: CPT | Performed by: FAMILY MEDICINE

## 2022-08-30 PROCEDURE — 99214 OFFICE O/P EST MOD 30 MIN: CPT | Performed by: FAMILY MEDICINE

## 2022-08-30 PROCEDURE — 90715 TDAP VACCINE 7 YRS/> IM: CPT | Performed by: FAMILY MEDICINE

## 2022-08-30 RX ORDER — DOXYCYCLINE HYCLATE 50 MG/1
50 CAPSULE ORAL DAILY
Qty: 90 CAPSULE | Refills: 1 | Status: SHIPPED | OUTPATIENT
Start: 2022-08-30

## 2022-08-30 RX ORDER — OMEPRAZOLE 40 MG/1
40 CAPSULE, DELAYED RELEASE ORAL DAILY
Qty: 30 CAPSULE | Refills: 2 | Status: SHIPPED | OUTPATIENT
Start: 2022-08-30 | End: 2023-01-06 | Stop reason: SDUPTHER

## 2022-08-30 RX ORDER — ESTRADIOL 0.1 MG/G
0.5 CREAM VAGINAL 2 TIMES WEEKLY
Qty: 42.5 G | Refills: 5 | Status: SHIPPED | OUTPATIENT
Start: 2022-09-01

## 2022-08-30 RX ORDER — VENLAFAXINE HYDROCHLORIDE 75 MG/1
75 CAPSULE, EXTENDED RELEASE ORAL DAILY
Qty: 90 CAPSULE | Refills: 0 | Status: SHIPPED | OUTPATIENT
Start: 2022-08-30 | End: 2023-01-06 | Stop reason: SDUPTHER

## 2022-08-30 RX ORDER — ATORVASTATIN CALCIUM 40 MG/1
40 TABLET, FILM COATED ORAL NIGHTLY
Qty: 90 TABLET | Refills: 1 | Status: SHIPPED | OUTPATIENT
Start: 2022-08-30 | End: 2022-10-11

## 2022-08-30 NOTE — PROGRESS NOTES
Caitlin Escalera presents to [facility].    Chief Complaint: BP and diabetes follow up    Subjective       History of Present Illness:    She presents with history of essential (primary) hypertension.  current nonpharmacologic treatment includes low sodium diet and exercise.  Her current cardiac medication regimen includes an ACE inhibitor.  Compliance with treatment has been good; she takes her medication as directed.  She is tolerating the medication well without side effects.  Home BP checks are good, 130/70s    She has a solitary kidney with a cyst off candasartan due to it causing her back pain    She has HS flare ups and is on minocycline and it is not helping her flare ups.      Patient presents with type 2 diabetes mellitus without complications. She reports home blood glucose readings have been excellent, with average fasting glucoses running <120 mg/dL.   She stopped the metformin and it is causing diarrhea.  She is on ozempic, tolerating med well, has lost over 25#'s and her BS are good, last eye exam was  >1 year ago-h/o L eye retinal bleed.  She is exercising more, feels good.            She has mixed hyperlipidemia, she is on atorvastatin 40 mg daily, tolerates med well, labs reviewed and cholesterol was 185, .    She is off vitamin D daily     Chronic GERD and she is worse with nighttime heartburn pain, she is  on omeprazole 20 mg nightly     She is menopausal and on effexor 75 mg and this helps her dysthmic disorder. She has vaginal atrophy and needs the estrogen cream filled.         Review of Systems:  Review of Systems   Constitutional: Negative for chills, fatigue and fever.   HENT: Negative for ear pain, sinus pressure and sore throat.    Eyes: Negative for blurred vision and double vision.   Respiratory: Negative for cough, shortness of breath and wheezing.    Cardiovascular: Negative for chest pain and palpitations.   Gastrointestinal: Positive for GERD.  Negative for abdominal pain, blood in stool, constipation, diarrhea, nausea and vomiting.   Skin: Negative for rash.   Neurological: Negative for dizziness and headache.   Psychiatric/Behavioral: Negative for depressed mood.        Objective   Medical History:  Past Medical History:   • Cyst of kidney, acquired   • Dyshidrosis   • Dysthymic disorder   • Elevated white blood cell count   • Essential (primary) hypertension   • Heartburn   • Hidradenitis   • HLD (hyperlipidemia)   • Low back pain   • Nicotine dependence, unspecified, uncomplicated   • Other specified menopausal and perimenopausal disorders   • Overweight   • Personal history of other diseases of urinary system   • Proteinuria   • Solitary kidney   • Type 2 diabetes mellitus without complication (HCC)     Past Surgical History:   • CHOLECYSTECTOMY   • HYSTERECTOMY    In 30s   • TUBAL ABDOMINAL LIGATION      Family History   Problem Relation Age of Onset   • Hypothyroidism Mother    • Diabetes type II Father    • Heart failure Father    • Cancer Brother         mantel cell cancer     Social History     Tobacco Use   • Smoking status: Current Every Day Smoker     Packs/day: 1.00     Years: 30.00     Pack years: 30.00     Types: Cigarettes   • Smokeless tobacco: Never Used   • Tobacco comment: checking with insurance for coverage for medication assistance    Substance Use Topics   • Alcohol use: Not Currently       Health Maintenance Due   Topic Date Due   • COVID-19 Vaccine (1) Never done   • ZOSTER VACCINE (1 of 2) Never done   • DIABETIC EYE EXAM  06/15/2022        Immunization History   Administered Date(s) Administered   • Fluzone Quad >6mos (Multi-dose) 09/01/2017   • Hepatitis B 04/07/2011, 05/17/2011, 10/22/2012   • MMR 04/07/2011   • Td 04/07/2011   • Tdap 08/30/2022       Allergies   Allergen Reactions   • Demerol [Meperidine] Unknown - High Severity   • Lisinopril Unknown - Low Severity   • Morphine Unknown - High Severity         Medications:  Current Outpatient Medications on File Prior to Visit   Medication Sig   • Accu-Chek Guide test strip 1 each by Other route 3 (Three) Times a Day. use to check glucose 3 times daily   • Accu-Chek Softclix Lancets lancets USE 1 UNIT TO CHECK GLUCOSE ONCE DAILY   • Blood Glucose Monitoring Suppl (Accu-Chek Guide) w/Device kit USE UNIT TO CHECK GLUCOSE THREE TIMES DAILY   • clindamycin (CLINDAGEL) 1 % gel APPLY TO AFFECTED AREA EVERY DAY   • promethazine (PHENERGAN) 12.5 MG tablet Take 1 tablet by mouth Every 6 (Six) Hours As Needed for Nausea or Vomiting.   • [DISCONTINUED] atorvastatin (LIPITOR) 40 MG tablet Take 1 tablet by mouth Every Night.   • [DISCONTINUED] estradiol (ESTRACE) 0.1 MG/GM vaginal cream Insert 0.5 g into the vagina 2 (Two) Times a Week.   • [DISCONTINUED] metFORMIN (GLUCOPHAGE) 500 MG tablet Take 2 tablets by mouth 2 (Two) Times a Day With Meals.   • [DISCONTINUED] minocycline (MINOCIN,DYNACIN) 100 MG capsule Take 1 capsule by mouth 2 (Two) Times a Day.   • [DISCONTINUED] omeprazole (priLOSEC) 40 MG capsule Take 1 capsule by mouth Daily.   • [DISCONTINUED] saccharomyces boulardii (Florastor) 250 MG capsule Take 1 capsule by mouth 2 (Two) Times a Day.   • [DISCONTINUED] Semaglutide,0.25 or 0.5MG/DOS, (Ozempic, 0.25 or 0.5 MG/DOSE,) 2 MG/1.5ML solution pen-injector Inject 0.5 mg under the skin into the appropriate area as directed 1 (One) Time Per Week.   • [DISCONTINUED] venlafaxine XR (EFFEXOR-XR) 75 MG 24 hr capsule Take 1 capsule by mouth Daily for 180 days.   • [DISCONTINUED] candesartan (ATACAND) 16 MG tablet TAKE 1 TABLET BY MOUTH EVERY DAY   • [DISCONTINUED] Cholecalciferol (Vitamin D3) 250 MCG (76738 UT) tablet Take  by mouth.   • [DISCONTINUED] fluticasone (Flonase) 50 MCG/ACT nasal spray 2 sprays into the nostril(s) as directed by provider Daily.   • [DISCONTINUED] Probiotic Product (PROBIOTIC PO) Take  by mouth Daily.     No current facility-administered medications on  "file prior to visit.       Vital Signs:   /76 (BP Location: Right arm, Patient Position: Sitting, Cuff Size: Adult)   Pulse 87   Ht 158.1 cm (62.24\")   Wt 77.6 kg (171 lb)   SpO2 95% Comment: Room air  BMI 31.04 kg/m²       Physical Exam:  Physical Exam  Vitals and nursing note reviewed.   Constitutional:       General: She is not in acute distress.     Appearance: Normal appearance. She is not ill-appearing, toxic-appearing or diaphoretic.   HENT:      Head: Normocephalic and atraumatic.      Right Ear: Tympanic membrane, ear canal and external ear normal.      Left Ear: Tympanic membrane, ear canal and external ear normal.      Nose: No congestion or rhinorrhea.      Mouth/Throat:      Mouth: Mucous membranes are moist.      Pharynx: Oropharynx is clear. No oropharyngeal exudate or posterior oropharyngeal erythema.   Eyes:      Extraocular Movements: Extraocular movements intact.      Conjunctiva/sclera: Conjunctivae normal.      Pupils: Pupils are equal, round, and reactive to light.   Cardiovascular:      Rate and Rhythm: Normal rate and regular rhythm.      Pulses:           Dorsalis pedis pulses are 2+ on the right side and 2+ on the left side.      Heart sounds: Normal heart sounds.   Pulmonary:      Effort: Pulmonary effort is normal.      Breath sounds: Normal breath sounds. No wheezing, rhonchi or rales.   Abdominal:      General: Abdomen is flat.      Palpations: Abdomen is soft.   Musculoskeletal:      Cervical back: Neck supple. No rigidity.   Feet:      Right foot:      Protective Sensation: 7 sites tested. 7 sites sensed.      Skin integrity: Callus present. No ulcer or blister.      Toenail Condition: Right toenails are abnormally thick.      Left foot:      Protective Sensation: 7 sites tested. 7 sites sensed.      Skin integrity: Callus present. No ulcer or blister.      Toenail Condition: Left toenails are normal.      Comments: Diabetic Foot Exam Performed and Monofilament Test " Performed     Lymphadenopathy:      Cervical: No cervical adenopathy.   Skin:     General: Skin is warm and dry.   Neurological:      Mental Status: She is alert and oriented to person, place, and time.   Psychiatric:         Mood and Affect: Mood normal.         Behavior: Behavior normal.         Result Review      The following data was reviewed by Rosalina Woodard MD on 03/24/2022.  Lab Results   Component Value Date    WBC 12.51 (H) 03/24/2022    HGB 16.5 (H) 03/24/2022    HCT 47.5 (H) 03/24/2022    MCV 93.1 03/24/2022     03/24/2022     Lab Results   Component Value Date    GLUCOSE 122 (H) 12/14/2021    BUN 12 12/14/2021    CREATININE 0.98 12/14/2021    EGFRIFNONA 60 (L) 12/14/2021    BCR 12.2 12/14/2021    K 4.4 12/14/2021    CO2 29.2 (H) 12/14/2021    CALCIUM 10.1 12/14/2021    ALBUMIN 4.60 12/14/2021    LABIL2 1.6 01/11/2021    AST 20 12/14/2021    ALT 24 12/14/2021     Lab Results   Component Value Date    CHOL 185 03/24/2022    CHLPL 197 01/11/2021    TRIG 231 (H) 03/24/2022    HDL 39 (L) 03/24/2022     (H) 03/24/2022     Lab Results   Component Value Date    TSH 1.890 12/14/2021     Lab Results   Component Value Date    HGBA1C 6.60 (H) 03/24/2022     No results found for: PSA                    Assessment and Plan:          Diagnoses and all orders for this visit:    1. Type 2 diabetes mellitus without complication, without long-term current use of insulin (HCC) (Primary)  Comments:  Pt is stable on ozempic, tolerating medications well, no changes are needed in current meds or treatment plan, eye exam UTD, foot exam noted above  Orders:  -     Discontinue: Semaglutide, 1 MG/DOSE, (OZEMPIC) 2 MG/1.5ML solution pen-injector; Inject 1 mg under the skin into the appropriate area as directed 1 (One) Time Per Week.  Dispense: 2 mL; Refill: 5  -     Discontinue: Semaglutide, 1 MG/DOSE, (OZEMPIC) 2 MG/1.5ML solution pen-injector; Inject 1 mg under the skin into the appropriate area as directed 1  (One) Time Per Week.  Dispense: 2 mL; Refill: 5  -     Semaglutide, 1 MG/DOSE, (OZEMPIC) 2 MG/1.5ML solution pen-injector; Inject 1 mg under the skin into the appropriate area as directed 1 (One) Time Per Week.  Dispense: 2 mL; Refill: 5  -     Comprehensive Metabolic Panel; Future  -     Lipid Panel; Future  -     Hemoglobin A1c; Future  -     Microalbumin / Creatinine Urine Ratio - Urine, Clean Catch; Future    2. Cyst of kidney, acquired    3. Heartburn  Comments:  stable on nexium, meds refilled  Orders:  -     omeprazole (priLOSEC) 40 MG capsule; Take 1 capsule by mouth Daily.  Dispense: 30 capsule; Refill: 2    4. Mixed hyperlipidemia  Comments:  Pt is stable on medsi-atorvastatin, tolerating medications well, no changes are needed in current meds or treatment plan  Orders:  -     atorvastatin (LIPITOR) 40 MG tablet; Take 1 tablet by mouth Every Night.  Dispense: 90 tablet; Refill: 1    5. Vitamin D deficiency  Comments:  stable on vitamin D supplementation  Orders:  -     Vitamin D 25 hydroxy; Future    6. Overweight  Comments:  will increase ozempic for her diabetes and to help weight loss    7. Hidradenitis  Comments:  minocycline is not working,will go back on doxycycline    8. Vaginal atrophy  Comments:  will start her on vaginal estrogen cream  Orders:  -     estradiol (ESTRACE) 0.1 MG/GM vaginal cream; Insert 0.5 g into the vagina 2 (Two) Times a Week.  Dispense: 42.5 g; Refill: 5    9. Menopause  Comments:  Worse with mood swings.  Will increase Effexor to 75 mg daily  Orders:  -     venlafaxine XR (EFFEXOR-XR) 75 MG 24 hr capsule; Take 1 capsule by mouth Daily for 180 days.  Dispense: 90 capsule; Refill: 0    10. Encounter for immunization  -     Tdap Vaccine Greater Than or Equal To 8yo IM    Other orders  -     doxycycline (VIBRAMYCIN) 50 MG capsule; Take 1 capsule by mouth Daily.  Dispense: 90 capsule; Refill: 1          Follow Up   Return in about 6 months (around 2/28/2023) for Annual  physical.

## 2022-09-20 ENCOUNTER — TELEPHONE (OUTPATIENT)
Dept: FAMILY MEDICINE CLINIC | Age: 52
End: 2022-09-20

## 2022-09-27 ENCOUNTER — PATIENT MESSAGE (OUTPATIENT)
Dept: FAMILY MEDICINE CLINIC | Age: 52
End: 2022-09-27

## 2022-09-29 DIAGNOSIS — E11.9 TYPE 2 DIABETES MELLITUS WITHOUT COMPLICATION, WITHOUT LONG-TERM CURRENT USE OF INSULIN: ICD-10-CM

## 2022-09-29 RX ORDER — SEMAGLUTIDE 1.34 MG/ML
0.5 INJECTION, SOLUTION SUBCUTANEOUS WEEKLY
OUTPATIENT
Start: 2022-09-29

## 2022-10-04 RX ORDER — METFORMIN HYDROCHLORIDE 500 MG/1
500 TABLET, EXTENDED RELEASE ORAL
Qty: 30 TABLET | Refills: 0 | Status: SHIPPED | OUTPATIENT
Start: 2022-10-04 | End: 2022-10-28 | Stop reason: SDUPTHER

## 2022-10-04 NOTE — TELEPHONE ENCOUNTER
Pt called back wanting to let you know yesterday her BS was 268 highest its ever been and today it was 170

## 2022-10-06 ENCOUNTER — LAB (OUTPATIENT)
Dept: LAB | Facility: HOSPITAL | Age: 52
End: 2022-10-06

## 2022-10-06 DIAGNOSIS — E11.9 TYPE 2 DIABETES MELLITUS WITHOUT COMPLICATION, WITHOUT LONG-TERM CURRENT USE OF INSULIN: ICD-10-CM

## 2022-10-06 DIAGNOSIS — E55.9 VITAMIN D DEFICIENCY: ICD-10-CM

## 2022-10-06 LAB
25(OH)D3 SERPL-MCNC: 31.8 NG/ML (ref 30–100)
ALBUMIN SERPL-MCNC: 4.3 G/DL (ref 3.5–5.2)
ALBUMIN UR-MCNC: 85.9 MG/DL
ALBUMIN/GLOB SERPL: 1.7 G/DL
ALP SERPL-CCNC: 86 U/L (ref 39–117)
ALT SERPL W P-5'-P-CCNC: 30 U/L (ref 1–33)
ANION GAP SERPL CALCULATED.3IONS-SCNC: 10.9 MMOL/L (ref 5–15)
AST SERPL-CCNC: 21 U/L (ref 1–32)
BILIRUB SERPL-MCNC: 0.7 MG/DL (ref 0–1.2)
BUN SERPL-MCNC: 14 MG/DL (ref 6–20)
BUN/CREAT SERPL: 14.9 (ref 7–25)
CALCIUM SPEC-SCNC: 9.9 MG/DL (ref 8.6–10.5)
CHLORIDE SERPL-SCNC: 96 MMOL/L (ref 98–107)
CHOLEST SERPL-MCNC: 226 MG/DL (ref 0–200)
CO2 SERPL-SCNC: 29.1 MMOL/L (ref 22–29)
CREAT SERPL-MCNC: 0.94 MG/DL (ref 0.57–1)
CREAT UR-MCNC: 119.8 MG/DL
EGFRCR SERPLBLD CKD-EPI 2021: 73.6 ML/MIN/1.73
GLOBULIN UR ELPH-MCNC: 2.5 GM/DL
GLUCOSE SERPL-MCNC: 172 MG/DL (ref 65–99)
HBA1C MFR BLD: 7.3 % (ref 4.8–5.6)
HDLC SERPL-MCNC: 45 MG/DL (ref 40–60)
LDLC SERPL CALC-MCNC: 135 MG/DL (ref 0–100)
LDLC/HDLC SERPL: 2.88 {RATIO}
MICROALBUMIN/CREAT UR: 717 MG/G
POTASSIUM SERPL-SCNC: 4.3 MMOL/L (ref 3.5–5.2)
PROT SERPL-MCNC: 6.8 G/DL (ref 6–8.5)
SODIUM SERPL-SCNC: 136 MMOL/L (ref 136–145)
TRIGL SERPL-MCNC: 257 MG/DL (ref 0–150)
VLDLC SERPL-MCNC: 46 MG/DL (ref 5–40)

## 2022-10-06 PROCEDURE — 83036 HEMOGLOBIN GLYCOSYLATED A1C: CPT

## 2022-10-06 PROCEDURE — 36415 COLL VENOUS BLD VENIPUNCTURE: CPT

## 2022-10-06 PROCEDURE — 80053 COMPREHEN METABOLIC PANEL: CPT

## 2022-10-06 PROCEDURE — 80061 LIPID PANEL: CPT

## 2022-10-06 PROCEDURE — 82570 ASSAY OF URINE CREATININE: CPT

## 2022-10-06 PROCEDURE — 82306 VITAMIN D 25 HYDROXY: CPT

## 2022-10-06 PROCEDURE — 82043 UR ALBUMIN QUANTITATIVE: CPT

## 2022-10-11 ENCOUNTER — OFFICE VISIT (OUTPATIENT)
Dept: FAMILY MEDICINE CLINIC | Age: 52
End: 2022-10-11

## 2022-10-11 VITALS
WEIGHT: 171.8 LBS | DIASTOLIC BLOOD PRESSURE: 84 MMHG | HEIGHT: 62 IN | OXYGEN SATURATION: 98 % | TEMPERATURE: 98.8 F | BODY MASS INDEX: 31.62 KG/M2 | HEART RATE: 88 BPM | SYSTOLIC BLOOD PRESSURE: 138 MMHG

## 2022-10-11 DIAGNOSIS — L73.2 HIDRADENITIS: ICD-10-CM

## 2022-10-11 DIAGNOSIS — E55.9 VITAMIN D DEFICIENCY: ICD-10-CM

## 2022-10-11 DIAGNOSIS — E78.2 MIXED HYPERLIPIDEMIA: ICD-10-CM

## 2022-10-11 DIAGNOSIS — H60.392 OTHER INFECTIVE ACUTE OTITIS EXTERNA OF LEFT EAR: ICD-10-CM

## 2022-10-11 DIAGNOSIS — E11.9 TYPE 2 DIABETES MELLITUS WITHOUT COMPLICATION, WITHOUT LONG-TERM CURRENT USE OF INSULIN: Primary | ICD-10-CM

## 2022-10-11 PROCEDURE — 99214 OFFICE O/P EST MOD 30 MIN: CPT | Performed by: FAMILY MEDICINE

## 2022-10-11 RX ORDER — COLISTIN SULFATE, NEOMYCIN SULFATE, THONZONIUM BROMIDE AND HYDROCORTISONE ACETATE 3; 3.3; .5; 1 MG/ML; MG/ML; MG/ML; MG/ML
3 SUSPENSION AURICULAR (OTIC) 4 TIMES DAILY
Qty: 10 ML | Refills: 0 | Status: SHIPPED | OUTPATIENT
Start: 2022-10-11 | End: 2023-03-15

## 2022-10-11 RX ORDER — BLOOD SUGAR DIAGNOSTIC
1 STRIP MISCELLANEOUS 2 TIMES DAILY
Qty: 200 EACH | Refills: 2 | Status: SHIPPED | OUTPATIENT
Start: 2022-10-11

## 2022-10-11 RX ORDER — ROSUVASTATIN CALCIUM 10 MG/1
10 TABLET, COATED ORAL DAILY
Qty: 90 TABLET | Refills: 1 | Status: SHIPPED | OUTPATIENT
Start: 2022-10-11 | End: 2023-03-03

## 2022-10-11 NOTE — PROGRESS NOTES
Caitlin Escalera presents to [facility].    Chief Complaint: BP and diabetes follow up    Subjective       History of Present Illness:    She presents with history of essential (primary) hypertension.  current nonpharmacologic treatment includes low sodium diet and exercise.  Her current cardiac medication regimen includes an ACE inhibitor.  Compliance with treatment has been good; she takes her medication as directed.  She is tolerating the medication well without side effects.  Home BP checks are good, 130/70s.  She has lost 20 pounds in last 6 months.     Patient presents with type 2 diabetes mellitus without complications. She reports home blood glucose readings have been excellent, with average fasting glucoses running <150 mg/dL.   She stopped the metformin and it is causing diarrhea- improved with changing her dose to one daily.  She is on ozempic 0.5 mg and she was recently increased to 1 mg and about to start this new dose, tolerating med well, last eye exam was  >1 year ago-h/o L eye retinal bleed.  She is exercising more, lost 20 #s .            She has mixed hyperlipidemia, she is on atorvastatin 40 mg daily, tolerates med well, labs reviewed and cholesterol was 226,       Chronic GERD and she is worse with nighttime heartburn pain, she is  on omeprazole 20 mg nightly     Ongoing boils in groin area despite being on doxycycline, using hibclenz and topical clindamycin       Review of Systems:  Review of Systems   Constitutional: Negative for chills, fatigue and fever.   HENT: Negative for ear pain, sinus pressure and sore throat.    Eyes: Negative for blurred vision and double vision.   Respiratory: Negative for cough, shortness of breath and wheezing.    Cardiovascular: Negative for chest pain and palpitations.   Gastrointestinal: Positive for GERD. Negative for abdominal pain, blood in stool, constipation, diarrhea, nausea and vomiting.   Skin: Negative for rash.    Neurological: Negative for dizziness and headache.   Psychiatric/Behavioral: Negative for depressed mood.        Objective   Medical History:  Past Medical History:   • Cyst of kidney, acquired   • Dyshidrosis   • Dysthymic disorder   • Elevated white blood cell count   • Essential (primary) hypertension   • Heartburn   • Hidradenitis   • HLD (hyperlipidemia)   • Low back pain   • Nicotine dependence, unspecified, uncomplicated   • Other specified menopausal and perimenopausal disorders   • Overweight   • Personal history of other diseases of urinary system   • Proteinuria   • Solitary kidney   • Type 2 diabetes mellitus without complication (HCC)     Past Surgical History:   • CHOLECYSTECTOMY   • HYSTERECTOMY    In 30s   • TUBAL ABDOMINAL LIGATION      Family History   Problem Relation Age of Onset   • Hypothyroidism Mother    • Diabetes type II Father    • Heart failure Father    • Cancer Brother         mantel cell cancer     Social History     Tobacco Use   • Smoking status: Every Day     Packs/day: 1.00     Years: 30.00     Pack years: 30.00     Types: Cigarettes   • Smokeless tobacco: Never   • Tobacco comments:     checking with insurance for coverage for medication assistance    Substance Use Topics   • Alcohol use: Not Currently       Health Maintenance Due   Topic Date Due   • ZOSTER VACCINE (1 of 2) Never done   • DIABETIC EYE EXAM  06/15/2022        Immunization History   Administered Date(s) Administered   • Fluzone Quad >6mos (Multi-dose) 09/01/2017   • Hepatitis B 04/07/2011, 05/17/2011, 10/22/2012   • MMR 04/07/2011   • Td 04/07/2011   • Tdap 08/30/2022       Allergies   Allergen Reactions   • Demerol [Meperidine] Unknown - High Severity   • Lisinopril Unknown - Low Severity   • Morphine Unknown - High Severity        Medications:  Current Outpatient Medications on File Prior to Visit   Medication Sig   • Accu-Chek Softclix Lancets lancets USE 1 UNIT TO CHECK GLUCOSE ONCE DAILY   • Blood Glucose  "Monitoring Suppl (Accu-Chek Guide) w/Device kit USE UNIT TO CHECK GLUCOSE THREE TIMES DAILY   • clindamycin (CLINDAGEL) 1 % gel APPLY TO AFFECTED AREA EVERY DAY   • doxycycline (VIBRAMYCIN) 50 MG capsule Take 1 capsule by mouth Daily.   • estradiol (ESTRACE) 0.1 MG/GM vaginal cream Insert 0.5 g into the vagina 2 (Two) Times a Week.   • metFORMIN ER (Glucophage XR) 500 MG 24 hr tablet Take 1 tablet by mouth Daily With Breakfast.   • omeprazole (priLOSEC) 40 MG capsule Take 1 capsule by mouth Daily.   • promethazine (PHENERGAN) 12.5 MG tablet Take 1 tablet by mouth Every 6 (Six) Hours As Needed for Nausea or Vomiting.   • Semaglutide, 1 MG/DOSE, (OZEMPIC) 2 MG/1.5ML solution pen-injector Inject 1 mg under the skin into the appropriate area as directed 1 (One) Time Per Week.   • venlafaxine XR (EFFEXOR-XR) 75 MG 24 hr capsule Take 1 capsule by mouth Daily for 180 days.   • [DISCONTINUED] Accu-Chek Guide test strip 1 each by Other route 3 (Three) Times a Day. use to check glucose 3 times daily   • [DISCONTINUED] atorvastatin (LIPITOR) 40 MG tablet Take 1 tablet by mouth Every Night.     No current facility-administered medications on file prior to visit.       Vital Signs:   /84 (BP Location: Right arm, Patient Position: Sitting, Cuff Size: Adult)   Pulse 88   Temp 98.8 °F (37.1 °C) (Oral)   Ht 158.1 cm (62.24\")   Wt 77.9 kg (171 lb 12.8 oz)   SpO2 98% Comment: Room air  BMI 31.18 kg/m²       Physical Exam:  Physical Exam  Vitals and nursing note reviewed.   Constitutional:       General: She is not in acute distress.     Appearance: Normal appearance. She is not ill-appearing, toxic-appearing or diaphoretic.   HENT:      Head: Normocephalic and atraumatic.      Right Ear: Tympanic membrane, ear canal and external ear normal.      Left Ear: Tympanic membrane and ear canal normal. Swelling and tenderness present.      Nose: No congestion or rhinorrhea.      Mouth/Throat:      Mouth: Mucous membranes are " moist.      Pharynx: Oropharynx is clear. No oropharyngeal exudate or posterior oropharyngeal erythema.   Eyes:      Extraocular Movements: Extraocular movements intact.      Conjunctiva/sclera: Conjunctivae normal.      Pupils: Pupils are equal, round, and reactive to light.   Cardiovascular:      Rate and Rhythm: Normal rate and regular rhythm.      Heart sounds: Normal heart sounds.   Pulmonary:      Effort: Pulmonary effort is normal.      Breath sounds: Normal breath sounds. No wheezing, rhonchi or rales.   Abdominal:      General: Abdomen is flat.      Palpations: Abdomen is soft.   Musculoskeletal:      Cervical back: Neck supple. No rigidity.   Feet:      Comments:      Lymphadenopathy:      Cervical: No cervical adenopathy.   Skin:     General: Skin is warm and dry.   Neurological:      Mental Status: She is alert and oriented to person, place, and time.   Psychiatric:         Mood and Affect: Mood normal.         Behavior: Behavior normal.         Result Review      The following data was reviewed by Rosalina Woodard MD on 03/24/2022.  Lab Results   Component Value Date    WBC 12.51 (H) 03/24/2022    HGB 16.5 (H) 03/24/2022    HCT 47.5 (H) 03/24/2022    MCV 93.1 03/24/2022     03/24/2022     Lab Results   Component Value Date    GLUCOSE 172 (H) 10/06/2022    BUN 14 10/06/2022    CREATININE 0.94 10/06/2022    EGFRIFNONA 60 (L) 12/14/2021    BCR 14.9 10/06/2022    K 4.3 10/06/2022    CO2 29.1 (H) 10/06/2022    CALCIUM 9.9 10/06/2022    ALBUMIN 4.30 10/06/2022    LABIL2 1.6 01/11/2021    AST 21 10/06/2022    ALT 30 10/06/2022     Lab Results   Component Value Date    CHOL 226 (H) 10/06/2022    CHLPL 197 01/11/2021    TRIG 257 (H) 10/06/2022    HDL 45 10/06/2022     (H) 10/06/2022     Lab Results   Component Value Date    TSH 1.890 12/14/2021     Lab Results   Component Value Date    HGBA1C 7.30 (H) 10/06/2022     No results found for: PSA                      Assessment and Plan:           Diagnoses and all orders for this visit:    1. Type 2 diabetes mellitus without complication, without long-term current use of insulin (HCC) (Primary)  Comments:  increase her ozempic to 1 mg weekly, cont metformin  Orders:  -     Accu-Chek Guide test strip; 1 each by Other route 2 (Two) Times a Day. use to check glucose 3 times daily  Dispense: 200 each; Refill: 2    2. Vitamin D deficiency  Comments:  cont vitamin D supplementation    3. Mixed hyperlipidemia  Comments:  will change lipitor to crestor    4. Hidradenitis  Comments:  will add benzoyl peroxide to her regimen, cont clindamycin and doxycycline  Orders:  -     benzoyl peroxide 5 % gel; Apply 1 application topically to the appropriate area as directed Daily.  Dispense: 42.5 g; Refill: 2    5. Other infective acute otitis externa of left ear  Comments:  will treat with topical antibiotic drops    Other orders  -     rosuvastatin (Crestor) 10 MG tablet; Take 1 tablet by mouth Daily.  Dispense: 90 tablet; Refill: 1  -     neomycin-colistin-hydrocortisone-thonzonium (Cortisporin-TC) 3.3-3-10-0.5 MG/ML otic suspension; Administer 3 drops into the left ear 4 (Four) Times a Day.  Dispense: 10 mL; Refill: 0          Follow Up   Return in about 3 months (around 1/11/2023) for Recheck.

## 2022-10-12 ENCOUNTER — TELEPHONE (OUTPATIENT)
Dept: FAMILY MEDICINE CLINIC | Age: 52
End: 2022-10-12

## 2022-10-12 NOTE — TELEPHONE ENCOUNTER
Caller: Caitlin Escalera    Relationship: Self    Best call back number: [phone]    What medication are you requesting: PCP RECOMMENDATION    What are your current symptoms: EAR INFECTION    Have you had these symptoms before:    [x] Yes  [] No    Have you been treated for these symptoms before:   [x] Yes  [] No    If a prescription is needed, what is your preferred pharmacy and phone number: [facility]/PHARMACY #[id number] - [place] - [address] [facility] [phone] [facility] [phone] FX     Additional notes:  THE PATIENT STATED SHE WOULD LIKE PCP TO PRESCRIBE A MEDICATION INSTEAD OF EAR DROPS. SHE WOULD LIKE A CALL BACK FROM [place] TO CONFIRM

## 2022-10-13 NOTE — TELEPHONE ENCOUNTER
I called the pharmacy and dr ag instructed to use the neomycin-polymixin, hydrocortisone eye drops same directions . Pharmacy informed

## 2022-10-13 NOTE — TELEPHONE ENCOUNTER
She does not warrant an oral antibiotic and it could do more harm than good.  Please call [facility] pharmacy and see if there is a cheaper eardrop for otitis externa that has antibiotic and steroid in it that we can prescribe for her instead that is more reasonable.  Thanks!  Dr. Woodard

## 2022-10-28 RX ORDER — METFORMIN HYDROCHLORIDE 500 MG/1
500 TABLET, EXTENDED RELEASE ORAL
Qty: 90 TABLET | Refills: 0 | Status: SHIPPED | OUTPATIENT
Start: 2022-10-28 | End: 2023-01-23

## 2022-12-13 RX ORDER — METFORMIN HYDROCHLORIDE 500 MG/1
TABLET, EXTENDED RELEASE ORAL
Qty: 90 TABLET | Refills: 0 | OUTPATIENT
Start: 2022-12-13

## 2023-01-06 ENCOUNTER — TELEPHONE (OUTPATIENT)
Dept: FAMILY MEDICINE CLINIC | Age: 53
End: 2023-01-06
Payer: COMMERCIAL

## 2023-01-06 DIAGNOSIS — Z78.0 MENOPAUSE: ICD-10-CM

## 2023-01-06 DIAGNOSIS — R12 HEARTBURN: ICD-10-CM

## 2023-01-06 NOTE — TELEPHONE ENCOUNTER
Caller: Caitlin Escalera    Relationship: Self    Best call back number: [phone]    Requested Prescriptions:   Requested Prescriptions     Pending Prescriptions Disp Refills   • venlafaxine XR (EFFEXOR-XR) 75 MG 24 hr capsule 90 capsule 0     Sig: Take 1 capsule by mouth Daily for 180 days.   • omeprazole (priLOSEC) 40 MG capsule 30 capsule 2     Sig: Take 1 capsule by mouth Daily.        Pharmacy where request should be sent: [facility]/PHARMACY #[id number] - [place] - [address] [facility] [phone] [facility] [phone] FX     Additional details provided by patient: PATIENT WOULD LIKE 3 MONTH SUPPLY     Does the patient have less than a 3 day supply:  [x] Yes  [] No    Would you like a call back once the refill request has been completed: [x] Yes [] No    If the office needs to give you a call back, can they leave a voicemail: [x] Yes [] No    Nathan Brown Rep   01/06/23 17:23 EST

## 2023-01-09 RX ORDER — OMEPRAZOLE 40 MG/1
40 CAPSULE, DELAYED RELEASE ORAL DAILY
Qty: 30 CAPSULE | Refills: 2 | Status: SHIPPED | OUTPATIENT
Start: 2023-01-09 | End: 2023-03-15

## 2023-01-09 RX ORDER — VENLAFAXINE HYDROCHLORIDE 75 MG/1
75 CAPSULE, EXTENDED RELEASE ORAL DAILY
Qty: 90 CAPSULE | Refills: 0 | Status: SHIPPED | OUTPATIENT
Start: 2023-01-09 | End: 2023-03-15

## 2023-01-23 RX ORDER — METFORMIN HYDROCHLORIDE 500 MG/1
TABLET, EXTENDED RELEASE ORAL
Qty: 90 TABLET | Refills: 0 | Status: SHIPPED | OUTPATIENT
Start: 2023-01-23 | End: 2023-03-15

## 2023-02-05 DIAGNOSIS — L73.2 HIDRADENITIS: ICD-10-CM

## 2023-02-06 RX ORDER — METHOCARBAMOL 750 MG/1
TABLET ORAL
Qty: 42.5 G | Refills: 2 | Status: SHIPPED | OUTPATIENT
Start: 2023-02-06

## 2023-03-03 RX ORDER — METFORMIN HYDROCHLORIDE 500 MG/1
TABLET, EXTENDED RELEASE ORAL
Qty: 90 TABLET | Refills: 0 | OUTPATIENT
Start: 2023-03-03

## 2023-03-03 RX ORDER — ROSUVASTATIN CALCIUM 10 MG/1
TABLET, COATED ORAL
Qty: 90 TABLET | Refills: 0 | Status: SHIPPED | OUTPATIENT
Start: 2023-03-03 | End: 2023-03-15

## 2023-03-15 ENCOUNTER — LAB (OUTPATIENT)
Dept: LAB | Facility: HOSPITAL | Age: 53
End: 2023-03-15
Payer: COMMERCIAL

## 2023-03-15 ENCOUNTER — OFFICE VISIT (OUTPATIENT)
Dept: FAMILY MEDICINE CLINIC | Age: 53
End: 2023-03-15
Payer: COMMERCIAL

## 2023-03-15 VITALS
HEIGHT: 62 IN | WEIGHT: 169.8 LBS | SYSTOLIC BLOOD PRESSURE: 137 MMHG | BODY MASS INDEX: 31.25 KG/M2 | OXYGEN SATURATION: 94 % | DIASTOLIC BLOOD PRESSURE: 75 MMHG | TEMPERATURE: 98.2 F | HEART RATE: 86 BPM

## 2023-03-15 DIAGNOSIS — E55.9 VITAMIN D DEFICIENCY: Primary | ICD-10-CM

## 2023-03-15 DIAGNOSIS — R12 HEARTBURN: ICD-10-CM

## 2023-03-15 DIAGNOSIS — E78.2 MIXED HYPERLIPIDEMIA: ICD-10-CM

## 2023-03-15 DIAGNOSIS — E11.9 TYPE 2 DIABETES MELLITUS WITHOUT COMPLICATION, WITHOUT LONG-TERM CURRENT USE OF INSULIN: ICD-10-CM

## 2023-03-15 DIAGNOSIS — Z78.0 MENOPAUSE: ICD-10-CM

## 2023-03-15 DIAGNOSIS — K21.9 GASTROESOPHAGEAL REFLUX DISEASE WITHOUT ESOPHAGITIS: ICD-10-CM

## 2023-03-15 LAB
ALBUMIN SERPL-MCNC: 4.3 G/DL (ref 3.5–5.2)
ALBUMIN/GLOB SERPL: 1.6 G/DL
ALP SERPL-CCNC: 82 U/L (ref 39–117)
ALT SERPL W P-5'-P-CCNC: 21 U/L (ref 1–33)
ANION GAP SERPL CALCULATED.3IONS-SCNC: 8 MMOL/L (ref 5–15)
AST SERPL-CCNC: 16 U/L (ref 1–32)
BILIRUB SERPL-MCNC: 0.5 MG/DL (ref 0–1.2)
BUN SERPL-MCNC: 15 MG/DL (ref 6–20)
BUN/CREAT SERPL: 16.3 (ref 7–25)
CALCIUM SPEC-SCNC: 9.5 MG/DL (ref 8.6–10.5)
CHLORIDE SERPL-SCNC: 101 MMOL/L (ref 98–107)
CHOLEST SERPL-MCNC: 172 MG/DL (ref 0–200)
CO2 SERPL-SCNC: 29 MMOL/L (ref 22–29)
CREAT SERPL-MCNC: 0.92 MG/DL (ref 0.57–1)
DEPRECATED RDW RBC AUTO: 44.4 FL (ref 37–54)
EGFRCR SERPLBLD CKD-EPI 2021: 75.1 ML/MIN/1.73
ERYTHROCYTE [DISTWIDTH] IN BLOOD BY AUTOMATED COUNT: 13 % (ref 12.3–15.4)
GLOBULIN UR ELPH-MCNC: 2.7 GM/DL
GLUCOSE SERPL-MCNC: 107 MG/DL (ref 65–99)
HBA1C MFR BLD: 6.8 % (ref 4.8–5.6)
HCT VFR BLD AUTO: 46.5 % (ref 34–46.6)
HDLC SERPL-MCNC: 46 MG/DL (ref 40–60)
HGB BLD-MCNC: 16 G/DL (ref 12–15.9)
LDLC SERPL CALC-MCNC: 103 MG/DL (ref 0–100)
LDLC/HDLC SERPL: 2.17 {RATIO}
MCH RBC QN AUTO: 31.6 PG (ref 26.6–33)
MCHC RBC AUTO-ENTMCNC: 34.4 G/DL (ref 31.5–35.7)
MCV RBC AUTO: 91.9 FL (ref 79–97)
PLATELET # BLD AUTO: 233 10*3/MM3 (ref 140–450)
PMV BLD AUTO: 8.4 FL (ref 6–12)
POTASSIUM SERPL-SCNC: 4.6 MMOL/L (ref 3.5–5.2)
PROT SERPL-MCNC: 7 G/DL (ref 6–8.5)
RBC # BLD AUTO: 5.06 10*6/MM3 (ref 3.77–5.28)
SODIUM SERPL-SCNC: 138 MMOL/L (ref 136–145)
TRIGL SERPL-MCNC: 131 MG/DL (ref 0–150)
VLDLC SERPL-MCNC: 23 MG/DL (ref 5–40)
WBC NRBC COR # BLD: 7.9 10*3/MM3 (ref 3.4–10.8)

## 2023-03-15 PROCEDURE — 85027 COMPLETE CBC AUTOMATED: CPT

## 2023-03-15 PROCEDURE — 99214 OFFICE O/P EST MOD 30 MIN: CPT | Performed by: FAMILY MEDICINE

## 2023-03-15 PROCEDURE — 82043 UR ALBUMIN QUANTITATIVE: CPT

## 2023-03-15 PROCEDURE — 36415 COLL VENOUS BLD VENIPUNCTURE: CPT

## 2023-03-15 PROCEDURE — 80053 COMPREHEN METABOLIC PANEL: CPT

## 2023-03-15 PROCEDURE — 82570 ASSAY OF URINE CREATININE: CPT

## 2023-03-15 PROCEDURE — 83036 HEMOGLOBIN GLYCOSYLATED A1C: CPT

## 2023-03-15 PROCEDURE — 80061 LIPID PANEL: CPT

## 2023-03-15 RX ORDER — VENLAFAXINE HYDROCHLORIDE 75 MG/1
75 CAPSULE, EXTENDED RELEASE ORAL DAILY
Qty: 90 CAPSULE | Refills: 1 | Status: SHIPPED | OUTPATIENT
Start: 2023-03-15 | End: 2023-09-11

## 2023-03-15 RX ORDER — OMEPRAZOLE 40 MG/1
40 CAPSULE, DELAYED RELEASE ORAL DAILY
Qty: 90 CAPSULE | Refills: 1 | Status: SHIPPED | OUTPATIENT
Start: 2023-03-15

## 2023-03-15 RX ORDER — ROSUVASTATIN CALCIUM 10 MG/1
10 TABLET, COATED ORAL DAILY
Qty: 90 TABLET | Refills: 1 | Status: SHIPPED | OUTPATIENT
Start: 2023-03-15

## 2023-03-15 RX ORDER — METFORMIN HYDROCHLORIDE 500 MG/1
500 TABLET, EXTENDED RELEASE ORAL
Qty: 90 TABLET | Refills: 1 | Status: SHIPPED | OUTPATIENT
Start: 2023-03-15

## 2023-03-15 NOTE — PROGRESS NOTES
Caitlin Escalera presents to [facility].    Chief Complaint: HTN and diabetes follow.   Subjective       History of Present Illness:  HPI    She presents with history of essential (primary) hypertension,   current nonpharmacologic treatment includes low sodium diet and exercise.  Her current cardiac medication regimen includes an ACE inhibitor.  Compliance with treatment has been good; she takes her medication as directed.  She is tolerating the medication well without side effects.  Home BP checks are good, 130/70s.  She has lost 20 pounds in last 6 months.     She also has type 2 diabetes mellitus without complications. She reports home blood glucose readings have been excellent, with average fasting glucoses running <120 mg/dL.   On low dose metformin 1 500 mg daily and no diarrhea (higher doses cause diarrhea), and ozempic 1 mg weekly, doing well, has lost 32#s.   Recommend yearly eye exams,  last eye exam was  2 months ago, no diabetic retinopathy, lazy R eye, h/o  L eye retinal bleed.  Denies depression/anxiety on venlafexine and she is happy and thriving.             H/O mixed hyperlipidemia, she is on atorvastatin 40 mg daily, tolerates med well, labs reviewed and cholesterol was 226,        Chronic GERD and she is stable on omeprazole 20 mg nightly     H/O intermittent boils in groin area stable on daily doxycycline, using hibclenz and topical clindamycin       Review of Systems:  Review of Systems   Constitutional: Negative for chills, fatigue and fever.   HENT: Negative for ear pain, sinus pressure and sore throat.    Eyes: Negative for blurred vision and double vision.   Respiratory: Negative for cough, shortness of breath and wheezing.    Cardiovascular: Negative for chest pain, palpitations and leg swelling.   Gastrointestinal: Negative for abdominal pain, blood in stool, constipation, diarrhea, nausea and vomiting.   Skin: Negative for rash.   Neurological: Negative  for dizziness and headache.   Psychiatric/Behavioral: Negative for sleep disturbance and suicidal ideas.        Objective   Medical History:  Past Medical History:   • Cyst of kidney, acquired   • Dyshidrosis   • Dysthymic disorder   • Elevated white blood cell count   • Essential (primary) hypertension   • Heartburn   • Hidradenitis   • HLD (hyperlipidemia)   • Low back pain   • Nicotine dependence, unspecified, uncomplicated   • Other specified menopausal and perimenopausal disorders   • Overweight   • Personal history of other diseases of urinary system   • Proteinuria   • Solitary kidney   • Type 2 diabetes mellitus without complication (HCC)     Past Surgical History:   • CHOLECYSTECTOMY   • HYSTERECTOMY    In 30s   • TUBAL ABDOMINAL LIGATION      Family History   Problem Relation Age of Onset   • Hypothyroidism Mother    • Diabetes type II Father    • Heart failure Father    • Cancer Brother         mantel cell cancer     Social History     Tobacco Use   • Smoking status: Every Day     Packs/day: 1.00     Years: 30.00     Pack years: 30.00     Types: Cigarettes   • Smokeless tobacco: Never   • Tobacco comments:     checking with insurance for coverage for medication assistance    Substance Use Topics   • Alcohol use: Not Currently       Health Maintenance Due   Topic Date Due   • ZOSTER VACCINE (1 of 2) Never done   • LUNG CANCER SCREENING  Never done   • MAMMOGRAM  11/17/2022   • ANNUAL PHYSICAL  12/14/2022   • COLORECTAL CANCER SCREENING  12/14/2022        Immunization History   Administered Date(s) Administered   • Fluzone Quad >6mos (Multi-dose) 09/01/2017   • Hepatitis B 04/07/2011, 05/17/2011, 10/22/2012   • MMR 04/07/2011   • Td 04/07/2011   • Tdap 08/30/2022       Allergies   Allergen Reactions   • Demerol [Meperidine] Unknown - High Severity   • Lisinopril Unknown - Low Severity   • Morphine Unknown - High Severity        Medications:  Current Outpatient Medications on File Prior to Visit   Medication  "Sig   • Accu-Chek Guide test strip 1 each by Other route 2 (Two) Times a Day. use to check glucose 3 times daily   • Accu-Chek Softclix Lancets lancets USE 1 UNIT TO CHECK GLUCOSE ONCE DAILY   • Acne Medication 5 5 % gel APPLY 1 APPLICATION TOPICALLY TO THE APPROPRIATE AREA AS DIRECTED DAILY.   • Blood Glucose Monitoring Suppl (Accu-Chek Guide) w/Device kit USE UNIT TO CHECK GLUCOSE THREE TIMES DAILY   • clindamycin (CLINDAGEL) 1 % gel APPLY TO AFFECTED AREA EVERY DAY   • doxycycline (VIBRAMYCIN) 50 MG capsule Take 1 capsule by mouth Daily.   • estradiol (ESTRACE) 0.1 MG/GM vaginal cream Insert 0.5 g into the vagina 2 (Two) Times a Week.   • [DISCONTINUED] metFORMIN ER (GLUCOPHAGE-XR) 500 MG 24 hr tablet TAKE 1 TABLET BY MOUTH EVERY DAY WITH BREAKFAST   • [DISCONTINUED] omeprazole (priLOSEC) 40 MG capsule Take 1 capsule by mouth Daily.   • [DISCONTINUED] rosuvastatin (CRESTOR) 10 MG tablet TAKE 1 TABLET BY MOUTH EVERY DAY   • [DISCONTINUED] Semaglutide, 1 MG/DOSE, (OZEMPIC) 2 MG/1.5ML solution pen-injector Inject 1 mg under the skin into the appropriate area as directed 1 (One) Time Per Week.   • [DISCONTINUED] venlafaxine XR (EFFEXOR-XR) 75 MG 24 hr capsule Take 1 capsule by mouth Daily for 180 days.   • [DISCONTINUED] neomycin-colistin-hydrocortisone-thonzonium (Cortisporin-TC) 3.3-3-10-0.5 MG/ML otic suspension Administer 3 drops into the left ear 4 (Four) Times a Day.   • [DISCONTINUED] promethazine (PHENERGAN) 12.5 MG tablet Take 1 tablet by mouth Every 6 (Six) Hours As Needed for Nausea or Vomiting.     No current facility-administered medications on file prior to visit.       Vital Signs:   /75 (BP Location: Left arm, Patient Position: Sitting, Cuff Size: Adult)   Pulse 86   Temp 98.2 °F (36.8 °C) (Oral)   Ht 158.1 cm (62.24\")   Wt 77 kg (169 lb 12.8 oz)   SpO2 94% Comment: Room air  BMI 30.82 kg/m²       Physical Exam:  Physical Exam  Vitals and nursing note reviewed.   Constitutional:       " General: She is not in acute distress.     Appearance: Normal appearance. She is not ill-appearing, toxic-appearing or diaphoretic.   HENT:      Head: Normocephalic and atraumatic.      Right Ear: Tympanic membrane, ear canal and external ear normal.      Left Ear: Tympanic membrane, ear canal and external ear normal.      Nose: No congestion or rhinorrhea.      Mouth/Throat:      Mouth: Mucous membranes are moist.      Pharynx: Oropharynx is clear. No oropharyngeal exudate or posterior oropharyngeal erythema.   Eyes:      Extraocular Movements: Extraocular movements intact.      Conjunctiva/sclera: Conjunctivae normal.      Pupils: Pupils are equal, round, and reactive to light.   Cardiovascular:      Rate and Rhythm: Normal rate and regular rhythm.      Heart sounds: Normal heart sounds.   Pulmonary:      Effort: Pulmonary effort is normal.      Breath sounds: Normal breath sounds. No wheezing, rhonchi or rales.   Abdominal:      General: Abdomen is flat. There is no distension.      Palpations: Abdomen is soft. There is no mass.      Tenderness: There is no abdominal tenderness.   Musculoskeletal:      Cervical back: Neck supple. No rigidity.   Lymphadenopathy:      Cervical: No cervical adenopathy.   Skin:     General: Skin is warm and dry.   Neurological:      General: No focal deficit present.      Mental Status: She is alert and oriented to person, place, and time.   Psychiatric:         Mood and Affect: Mood normal.         Behavior: Behavior normal.         Thought Content: Thought content normal.         Judgment: Judgment normal.         Result Review      The following data was reviewed by Rosalina Woodard MD on 03/15/2023.  Lab Results   Component Value Date    WBC 12.51 (H) 03/24/2022    HGB 16.5 (H) 03/24/2022    HCT 47.5 (H) 03/24/2022    MCV 93.1 03/24/2022     03/24/2022     Lab Results   Component Value Date    GLUCOSE 172 (H) 10/06/2022    BUN 14 10/06/2022    CREATININE 0.94 10/06/2022     EGFR 73.6 10/06/2022    BCR 14.9 10/06/2022    K 4.3 10/06/2022    CO2 29.1 (H) 10/06/2022    CALCIUM 9.9 10/06/2022    ALBUMIN 4.30 10/06/2022    BILITOT 0.7 10/06/2022    AST 21 10/06/2022    ALT 30 10/06/2022     Lab Results   Component Value Date    CHOL 226 (H) 10/06/2022    CHLPL 197 01/11/2021    TRIG 257 (H) 10/06/2022    HDL 45 10/06/2022     (H) 10/06/2022     Lab Results   Component Value Date    TSH 1.890 12/14/2021     Lab Results   Component Value Date    HGBA1C 7.30 (H) 10/06/2022     No results found for: PSA                    Assessment and Plan:          Diagnoses and all orders for this visit:    1. Vitamin D deficiency (Primary)    2. Heartburn  Comments:  stable on nexium, meds refilled  Orders:  -     omeprazole (priLOSEC) 40 MG capsule; Take 1 capsule by mouth Daily.  Dispense: 90 capsule; Refill: 1    3. Type 2 diabetes mellitus without complication, without long-term current use of insulin ([facility])  Comments:  Pt is stable on ozempic, tolerating medications well, no changes are needed in current meds or treatment plan, eye exam UTD, foot exam noted above  Orders:  -     metFORMIN ER (GLUCOPHAGE-XR) 500 MG 24 hr tablet; Take 1 tablet by mouth Daily With Breakfast.  Dispense: 90 tablet; Refill: 1  -     Semaglutide, 1 MG/DOSE, (OZEMPIC) 2 MG/1.5ML solution pen-injector; Inject 1 mg under the skin into the appropriate area as directed 1 (One) Time Per Week.  Dispense: 6 mL; Refill: 5  -     Comprehensive Metabolic Panel; Future  -     CBC (No Diff); Future  -     Hemoglobin A1c; Future  -     Microalbumin / Creatinine Urine Ratio - Urine, Clean Catch; Future    4. Menopause  Comments:  Worse with mood swings.  Will increase Effexor to 75 mg daily  Orders:  -     venlafaxine XR (EFFEXOR-XR) 75 MG 24 hr capsule; Take 1 capsule by mouth Daily for 180 days.  Dispense: 90 capsule; Refill: 1    5. Gastroesophageal reflux disease without esophagitis    6. Mixed hyperlipidemia  -      rosuvastatin (CRESTOR) 10 MG tablet; Take 1 tablet by mouth Daily.  Dispense: 90 tablet; Refill: 1  -     Lipid Panel; Future          Follow Up   Return in about 3 months (around 6/15/2023) for Annual physical.

## 2023-03-16 LAB
ALBUMIN UR-MCNC: 51.9 MG/DL
CREAT UR-MCNC: 45.1 MG/DL
MICROALBUMIN/CREAT UR: 1150.8 MG/G

## 2023-04-24 RX ORDER — DOXYCYCLINE HYCLATE 50 MG/1
CAPSULE ORAL
Qty: 90 CAPSULE | Refills: 1 | Status: SHIPPED | OUTPATIENT
Start: 2023-04-24

## 2023-06-12 ENCOUNTER — TELEPHONE (OUTPATIENT)
Dept: FAMILY MEDICINE CLINIC | Age: 53
End: 2023-06-12
Payer: COMMERCIAL

## 2023-06-12 NOTE — TELEPHONE ENCOUNTER
Caller: Caitlin Escalera    Relationship: Self    Best call back number: [phone]     What medication are you requesting: CREAM    What are your current symptoms: CONTACT DERMATITIS     Have you had these symptoms before:    [x] Yes  [] No    Have you been treated for these symptoms before:   [x] Yes  [] No    If a prescription is needed, what is your preferred pharmacy and phone number: [id number] - [address] AT [facility] & [address] - [phone] [facility] [phone]      Additional notes:    PATIENT STATES SHE CONTACTED HER DERMATOLOGIST AND WAS INSTRUCTED TO REQUEST THE MEDICATION FOR DR WASHINGTON.

## 2023-06-13 ENCOUNTER — OFFICE VISIT (OUTPATIENT)
Dept: FAMILY MEDICINE CLINIC | Age: 53
End: 2023-06-13
Payer: COMMERCIAL

## 2023-06-13 VITALS
WEIGHT: 171 LBS | OXYGEN SATURATION: 96 % | HEART RATE: 100 BPM | DIASTOLIC BLOOD PRESSURE: 71 MMHG | SYSTOLIC BLOOD PRESSURE: 125 MMHG | HEIGHT: 62 IN | BODY MASS INDEX: 31.47 KG/M2

## 2023-06-13 DIAGNOSIS — S60.519A ABRASION, HAND W/O INFECTION: ICD-10-CM

## 2023-06-13 DIAGNOSIS — L25.3 CONTACT DERMATITIS DUE TO OTHER CHEMICAL PRODUCT, UNSPECIFIED CONTACT DERMATITIS TYPE: Primary | ICD-10-CM

## 2023-06-13 NOTE — PROGRESS NOTES
"Chief Complaint  Rash (Sx started 2 weeks ago )    Subjective    Patient is in today with complaints of itchy rash on hands that started 2 weeks ago.  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  She has tried over-the-counter calamine, hydrocortisone cream, Benadryl cream without improvement.  Patient denies any other symptoms at this time.        Caitlin Escalera presents to [facility]  Rash  This is a new problem. The current episode started 1 to 4 weeks ago. The problem has been gradually worsening since onset. The affected locations include the left hand and right hand. The rash is characterized by blistering, itchiness and redness. She was exposed to chemicals. Pertinent negatives include no fatigue or shortness of breath. Past treatments include antihistamine, anti-itch cream and cold compress. The treatment provided mild relief.     Objective   Vital Signs:  /71 (BP Location: Left arm, Patient Position: Sitting, Cuff Size: Adult)   Pulse 100   Ht 158.1 cm (62.24\")   Wt 77.6 kg (171 lb)   SpO2 96% Comment: room air  BMI 31.04 kg/m²   Estimated body mass index is 31.04 kg/m² as calculated from the following:    Height as of this encounter: 158.1 cm (62.24\").    Weight as of this encounter: 77.6 kg (171 lb).             Physical Exam  HENT:      Head: Normocephalic.   Cardiovascular:      Rate and Rhythm: Normal rate.   Pulmonary:      Effort: Pulmonary effort is normal.   Skin:     General: Skin is warm and dry.      Findings: Rash present.   Neurological:      Mental Status: She is alert and oriented to person, place, and time.   Psychiatric:         Mood and Affect: Mood normal.      Result Review :                   Assessment and Plan   Diagnoses and all orders for this visit:    1. Contact dermatitis due to other chemical product, unspecified contact dermatitis type (Primary)  Comments:  Avoid direct contact with " irritant  Orders:  -     triamcinolone (KENALOG) 0.1 % ointment; Apply 1 application topically to the appropriate area as directed 2 (Two) Times a Day.  Dispense: 80 g; Refill: 3    2. Abrasion, hand w/o infection  Comments:  Monitor for signs of infection, notify office of redness, warmth or drainage  Orders:  -     mupirocin (BACTROBAN) 2 % ointment; Apply 1 application topically to the appropriate area as directed 3 (Three) Times a Day.  Dispense: 30 g; Refill: 0             Follow Up   Return if symptoms worsen or fail to improve.  Patient was given instructions and counseling regarding her condition or for health maintenance advice. Please see specific information pulled into the AVS if appropriate.

## 2023-07-24 ENCOUNTER — TELEPHONE (OUTPATIENT)
Dept: FAMILY MEDICINE CLINIC | Age: 53
End: 2023-07-24
Payer: COMMERCIAL

## 2023-07-24 NOTE — TELEPHONE ENCOUNTER
Left message about overdue Mammogram, DEXA, and low dose CT of Chest cancer screening with scheduling number. Kp

## 2023-07-24 NOTE — TELEPHONE ENCOUNTER
----- Message from Kami Ziegler sent at 7/10/2023  9:08 AM EDT -----      ----- Message -----  From: SYSTEM  Sent: 7/10/2023   1:36 AM EDT  To: isaiah Villavicencio [facility]

## 2023-09-10 DIAGNOSIS — E11.9 TYPE 2 DIABETES MELLITUS WITHOUT COMPLICATION, WITHOUT LONG-TERM CURRENT USE OF INSULIN: ICD-10-CM

## 2023-09-10 DIAGNOSIS — L40.9 PSORIASIS: ICD-10-CM

## 2023-09-11 RX ORDER — SEMAGLUTIDE 2.68 MG/ML
2 INJECTION, SOLUTION SUBCUTANEOUS WEEKLY
OUTPATIENT
Start: 2023-09-11

## 2023-09-12 NOTE — TELEPHONE ENCOUNTER
Please contact patient and find out what is going on with her rash because she has gone through a lot of steroid medication quickly she may need to be reevaluated in a different treatment plan placed.  Dr. Woodard

## 2023-09-18 DIAGNOSIS — E11.9 TYPE 2 DIABETES MELLITUS WITHOUT COMPLICATION, WITHOUT LONG-TERM CURRENT USE OF INSULIN: ICD-10-CM

## 2023-09-18 RX ORDER — SEMAGLUTIDE 2.68 MG/ML
2 INJECTION, SOLUTION SUBCUTANEOUS WEEKLY
Qty: 3 ML | Refills: 2 | Status: SHIPPED | OUTPATIENT
Start: 2023-09-18

## 2023-10-04 DIAGNOSIS — E11.9 TYPE 2 DIABETES MELLITUS WITHOUT COMPLICATION, WITHOUT LONG-TERM CURRENT USE OF INSULIN: ICD-10-CM

## 2023-10-04 RX ORDER — METFORMIN HYDROCHLORIDE 500 MG/1
TABLET, EXTENDED RELEASE ORAL
Qty: 90 TABLET | Refills: 1 | Status: SHIPPED | OUTPATIENT
Start: 2023-10-04

## 2023-10-18 ENCOUNTER — PATIENT MESSAGE (OUTPATIENT)
Dept: FAMILY MEDICINE CLINIC | Age: 53
End: 2023-10-18
Payer: COMMERCIAL

## 2023-10-19 ENCOUNTER — OFFICE VISIT (OUTPATIENT)
Dept: FAMILY MEDICINE CLINIC | Age: 53
End: 2023-10-19
Payer: COMMERCIAL

## 2023-10-19 VITALS
BODY MASS INDEX: 32.2 KG/M2 | TEMPERATURE: 98.5 F | WEIGHT: 175 LBS | OXYGEN SATURATION: 96 % | HEART RATE: 93 BPM | SYSTOLIC BLOOD PRESSURE: 160 MMHG | HEIGHT: 62 IN | DIASTOLIC BLOOD PRESSURE: 74 MMHG

## 2023-10-19 DIAGNOSIS — L20.9 ATOPIC DERMATITIS, UNSPECIFIED TYPE: Primary | ICD-10-CM

## 2023-10-19 DIAGNOSIS — L40.9 PSORIASIS: ICD-10-CM

## 2023-10-19 PROCEDURE — 99213 OFFICE O/P EST LOW 20 MIN: CPT | Performed by: FAMILY MEDICINE

## 2023-10-19 RX ORDER — PREDNISONE 20 MG/1
40 TABLET ORAL DAILY
Qty: 6 TABLET | Refills: 0 | Status: SHIPPED | OUTPATIENT
Start: 2023-10-19 | End: 2023-10-22

## 2023-10-19 RX ORDER — TRIAMCINOLONE ACETONIDE 1 MG/G
OINTMENT TOPICAL
COMMUNITY
Start: 2023-10-03 | End: 2023-10-19

## 2023-10-19 NOTE — PROGRESS NOTES
"Caitlin Escalera presents to [facility].    Chief Complaint: Hand erythema and pain    Subjective     History of Present Illness:  Hand Injury   Pertinent negatives include no chest pain.  Caitlin presents with recurrent severe palmar surface edema and pain with scalding sores.  She works with epoxy and uses double gloves and this time does not think it was the epoxy but states it was after she cleaned her work area with alcohol.  However it still could be the epoxy because she cleaned her work area without gloves and epoxy was probably still present everywhere.  Her hands are very painful.  She has used betamethasone ointment in the past that has worked well for her.      Result Review   The following data was reviewed by Rosalina Woodard MD on 10/19/2023.  Lab Results   Component Value Date    WBC 7.90 03/15/2023    HGB 16.0 (H) 03/15/2023    HCT 46.5 03/15/2023    MCV 91.9 03/15/2023     03/15/2023     Lab Results   Component Value Date    GLUCOSE 107 (H) 03/15/2023    BUN 15 03/15/2023    CREATININE 0.92 03/15/2023    EGFR 75.1 03/15/2023    BCR 16.3 03/15/2023    K 4.6 03/15/2023    CO2 29.0 03/15/2023    CALCIUM 9.5 03/15/2023    ALBUMIN 4.3 03/15/2023    BILITOT 0.5 03/15/2023    AST 16 03/15/2023    ALT 21 03/15/2023     Lab Results   Component Value Date    CHOL 172 03/15/2023    CHLPL 197 01/11/2021    TRIG 131 03/15/2023    HDL 46 03/15/2023     (H) 03/15/2023     Lab Results   Component Value Date    TSH 1.890 12/14/2021     Lab Results   Component Value Date    HGBA1C 6.80 (H) 03/15/2023     No results found for: \"PSA\"      Lab Results   Component Value Date    CIYD42UE 31.8 10/06/2022               Assessment and Plan:   Diagnoses and all orders for this visit:    1. Atopic dermatitis, unspecified type (Primary)  Comments:  We will treat with oral prednisone 40 mg x 3 days and refill her topical betamethasone and she is aware she is not to use this more than " "10 days  Orders:  -     predniSONE (DELTASONE) 20 MG tablet; Take 2 tablets by mouth Daily for 3 days.  Dispense: 6 tablet; Refill: 0  -     betamethasone valerate (VALISONE) 0.1 % ointment; Apply  topically to the appropriate area as directed 2 (Two) Times a Day.  Dispense: 45 g; Refill: 0    2. Psoriasis  Comments:  We will treat with oral prednisone 40 mg x 3 days and refill her topical betamethasone and she is aware she is not to use this more than 10 days              Objective     Medications:  Current Outpatient Medications   Medication Instructions   • Accu-Chek Guide test strip 1 each, Other, 2 Times Daily, use to check glucose 3 times daily   • Accu-Chek Softclix Lancets lancets USE 1 UNIT TO CHECK GLUCOSE ONCE DAILY   • Acne Medication 5 5 % gel APPLY 1 APPLICATION TOPICALLY TO THE APPROPRIATE AREA AS DIRECTED DAILY.   • betamethasone valerate (VALISONE) 0.1 % ointment Topical, 2 Times Daily   • Blood Glucose Monitoring Suppl (Accu-Chek Guide) w/Device kit USE UNIT TO CHECK GLUCOSE THREE TIMES DAILY   • clindamycin (CLINDAGEL) 1 % gel APPLY TO AFFECTED AREA EVERY DAY   • estradiol (ESTRACE) 0.5 g, Vaginal, 2 Times Weekly   • gentamicin (GARAMYCIN) 0.1 % ointment 1 application , Topical, 3 Times Daily   • metFORMIN ER (GLUCOPHAGE-XR) 500 MG 24 hr tablet TAKE 1 TABLET BY MOUTH EVERY DAY WITH BREAKFAST   • omeprazole (PRILOSEC) 40 mg, Oral, Daily   • ondansetron ODT (ZOFRAN-ODT) 4 mg, Translingual, Every 8 Hours PRN   • Ozempic (2 MG/DOSE) 2 mg, Subcutaneous, Weekly   • predniSONE (DELTASONE) 40 mg, Oral, Daily   • rosuvastatin (CRESTOR) 10 mg, Oral, Daily   • venlafaxine XR (EFFEXOR-XR) 75 mg, Oral, Daily        Vital Signs:   /74 (BP Location: Right arm, Patient Position: Sitting)   Pulse 93   Temp 98.5 °F (36.9 °C) (Oral)   Ht 158.1 cm (62.24\")   Wt 79.4 kg (175 lb)   SpO2 96%   BMI 31.76 kg/m²             Physical Exam:  Physical Exam  Vitals reviewed.   Constitutional:       General: She is " not in acute distress.     Appearance: Normal appearance. She is normal weight. She is not ill-appearing.   HENT:      Head: Normocephalic and atraumatic.   Eyes:      Extraocular Movements: Extraocular movements intact.      Pupils: Pupils are equal, round, and reactive to light.   Cardiovascular:      Rate and Rhythm: Normal rate and regular rhythm.   Pulmonary:      Effort: Pulmonary effort is normal.      Breath sounds: Normal breath sounds.   Skin:         Neurological:      General: No focal deficit present.      Mental Status: She is alert and oriented to person, place, and time.   Psychiatric:         Mood and Affect: Mood normal.         Behavior: Behavior normal.         Thought Content: Thought content normal.         Judgment: Judgment normal.       Review of Systems:  Review of Systems   Constitutional:  Negative for chills and fever.   HENT:  Negative for ear pain, sinus pressure and sore throat.    Eyes:  Negative for blurred vision and double vision.   Respiratory:  Negative for cough, shortness of breath and wheezing.    Cardiovascular:  Negative for chest pain and palpitations.   Gastrointestinal:  Negative for abdominal pain, blood in stool, constipation, diarrhea, nausea and vomiting.   Neurological:  Negative for dizziness and headache.   Psychiatric/Behavioral:  Negative for sleep disturbance, suicidal ideas and depressed mood. The patient is not nervous/anxious.             Follow Up   No follow-ups on file.    Part of this note may be an electronic transcription/translation of spoken language to printed   text using the Dragon Dictation System.            Medical History:  Medications Discontinued During This Encounter   Medication Reason   • triamcinolone (KENALOG) 0.1 % ointment *Error   • betamethasone valerate (VALISONE) 0.1 % ointment Reorder      Past Medical History:   • Allergic    Allergic to morphine/ demerol   • Anxiety    Sometimes   • Arthritis    I had a bone density 10 years  ago  and a xray afew month ago   • Asthma    I get short of breath easy   • Cholelithiasis    Removed on 2/3/88   • Cyst of kidney, acquired   • Dyshidrosis   • Dysthymic disorder   • Elevated white blood cell count   • Essential (primary) hypertension   • GERD (gastroesophageal reflux disease)    About 10 years ago   • Heartburn   • Hidradenitis   • History of medical problems    My brother passed on at 51 canc   • HL (hearing loss)    Ringing in ears mild   • HLD (hyperlipidemia)   • Low back pain   • Nicotine dependence, unspecified, uncomplicated   • Obesity    Cant loose bit dont gain   • Other specified menopausal and perimenopausal disorders   • Overweight   • Personal history of other diseases of urinary system   • Proteinuria   • Renal insufficiency    I have a left kidney only with a cyst in it   • Solitary kidney   • Type 2 diabetes mellitus without complication   • Visual impairment    Noticed i some times get blurry vision     Past Surgical History:   • CHOLECYSTECTOMY   • HYSTERECTOMY    In 30s   • SUBTOTAL HYSTERECTOMY    18 years ago i still have ovaries   • TUBAL ABDOMINAL LIGATION      Family History   Problem Relation Age of Onset   • Hypothyroidism Mother    • Anxiety disorder Mother         Hers is really bad   • Depression Mother    • Diabetes Mother    • Hearing loss Mother    • Hyperlipidemia Mother    • Miscarriages / Stillbirths Mother         Still birth at almost 9 month's gestation   • Thyroid disease Mother    • Vision loss Mother    • Diabetes type II Father    • Heart failure Father    • Alcohol abuse Father         He became bipolar when he drank   • Arthritis Father    • Asthma Father    • COPD Father    • Depression Father    • Diabetes Father    • Heart disease Father         Congestive heart failure/ pacemaker   • Hyperlipidemia Father    • Mental illness Father         Bipolar/paranoid scitsofrania   • Vision loss Father    • Cancer Brother         Lymphoma/ bone   • Early death  Brother         Found cancer past 7 to 8 weeks later   • Hearing loss Brother    • Vision loss Brother    • Cancer Maternal Grandfather         Passed away of Throat cancer   • Cancer Maternal Grandmother         Pancreatic cancer   • COPD Paternal Grandfather          from it   • Diabetes Paternal Grandmother    • Stroke Paternal Grandmother         Its what made her pass away   • Anxiety disorder Sister         Hers is bad   • Arthritis Sister    • Asthma Sister    • Depression Sister    • Diabetes Sister    • Learning disabilities Sister         Adhd   • Vision loss Sister    • Cancer Brother         Kidney cancer / healed no more cancer   • Diabetes Brother    • Hearing loss Brother    • Vision loss Brother      Social History     Tobacco Use   • Smoking status: Every Day     Packs/day: 1.00     Years: 30.00     Additional pack years: 0.00     Total pack years: 30.00     Types: Cigarettes   • Smokeless tobacco: Never   • Tobacco comments:     Tried everything to quit   Substance Use Topics   • Alcohol use: Not Currently     Comment: I probably have a taste once or twice a year       Health Maintenance Due   Topic Date Due   • LUNG CANCER SCREENING  Never done   • MAMMOGRAM  2022   • INFLUENZA VACCINE  2023   • DIABETIC FOOT EXAM  2023   • HEMOGLOBIN A1C  09/15/2023        Immunization History   Administered Date(s) Administered   • Fluzone (or Fluarix & Flulaval for VFC) >6mos 2017   • Fluzone Quad >6mos (Multi-dose) 2017   • Hepatitis B Adult/Adolescent IM 2011, 2011, 10/22/2012   • MMR 2011   • Td (TDVAX) 1998, 2011   • Tdap 2022       Allergies   Allergen Reactions   • Demerol [Meperidine] Unknown - High Severity   • Lisinopril Unknown - Low Severity   • Morphine Unknown - High Severity

## 2023-10-26 RX ORDER — PREDNISONE 20 MG/1
20 TABLET ORAL 2 TIMES DAILY
Qty: 6 TABLET | Refills: 0 | Status: SHIPPED | OUTPATIENT
Start: 2023-10-26

## 2023-11-29 ENCOUNTER — TELEPHONE (OUTPATIENT)
Dept: FAMILY MEDICINE CLINIC | Age: 53
End: 2023-11-29
Payer: COMMERCIAL

## 2023-11-29 NOTE — TELEPHONE ENCOUNTER
Pt is calling and she wants something to help her stop smoking .      And    Her  had a heart attack and she would like for you to be his new pcp please advise

## 2023-11-30 NOTE — TELEPHONE ENCOUNTER
Happy to take him as a patient but it may be 3-4 months before I can get him in, and she can try the nicotine patches OTC, start with 21 mg for 3 weeks, then 14 mg for 3 weeks there 7 mg for 3 weeks and she can call [address] [place], [place] [id number] for assistance, she will need to be seen for medications like buspar. Dr Woodard

## 2023-11-30 NOTE — TELEPHONE ENCOUNTER
Pt inf she wants an RX sent she said that her insurance covers it . I scheduled her  for an appt in feb but I did recommend he been seen for a TCM sooner with current provider Brenda Esqueda

## 2023-12-04 NOTE — TELEPHONE ENCOUNTER
Is she wanting a RX for the nicotine patches?  I can send that in for her, if she wants the buspar she will need to be seen. Dr Woodard

## 2023-12-05 DIAGNOSIS — Z72.0 TOBACCO ABUSE: Primary | ICD-10-CM

## 2023-12-05 RX ORDER — NICOTINE 21 MG/24HR
1 PATCH, TRANSDERMAL 24 HOURS TRANSDERMAL EVERY 24 HOURS
Qty: 21 PATCH | Refills: 2 | Status: SHIPPED | OUTPATIENT
Start: 2023-12-05

## 2023-12-11 ENCOUNTER — OFFICE VISIT (OUTPATIENT)
Dept: CARDIOLOGY | Facility: CLINIC | Age: 53
End: 2023-12-11
Payer: COMMERCIAL

## 2023-12-11 VITALS
BODY MASS INDEX: 32.57 KG/M2 | HEART RATE: 77 BPM | WEIGHT: 177 LBS | DIASTOLIC BLOOD PRESSURE: 89 MMHG | SYSTOLIC BLOOD PRESSURE: 135 MMHG | HEIGHT: 62 IN

## 2023-12-11 DIAGNOSIS — R00.2 PALPITATIONS: ICD-10-CM

## 2023-12-11 DIAGNOSIS — E78.2 MIXED HYPERLIPIDEMIA: ICD-10-CM

## 2023-12-11 DIAGNOSIS — R07.9 CHEST PAIN, UNSPECIFIED TYPE: ICD-10-CM

## 2023-12-11 DIAGNOSIS — R06.09 DYSPNEA ON EXERTION: Primary | ICD-10-CM

## 2023-12-11 PROCEDURE — 99204 OFFICE O/P NEW MOD 45 MIN: CPT | Performed by: INTERNAL MEDICINE

## 2023-12-11 NOTE — PROGRESS NOTES
"Chief Complaint  Establish Care, Rapid Heart Rate, Chest Pain, and Shortness of Breath    Subjective        Catilin Escalera presents to [facility]  History of present illness:    Patient is a 53-year-old female with past medical history significant for diabetes and hyperlipidemia.  She has a remote smoking history before recently quitting.  She presents with some shortness of breath with exertion.  She also notes some chest pain.  She describes it as \"like a soda going down the wrong way.\"  This can occur just sitting still.  She notes this has been going on for years.  She also notes an episode of palpitations where she was sitting out on her porch and felt her heart racing.  She felt like it was going to beat out of her chest.  Her watch estimated her heart rate in the 200 bpm range.  She has only had 1 episode of this and none since.  Her father has a history of heart attack at age 39, pacemaker and dying at age 58.  She notes no alcohol use.  Mother has no heart disease.    Past Medical History:   Diagnosis Date    Allergic 10 years ago    Allergic to morphine/ demerol    Anxiety     Sometimes    Arthritis     I had a bone density 10 years ago  and a xray afew month ago    Asthma June 2021    I get short of breath easy    Cholelithiasis Nov 1987    Removed on 2/3/88    Cyst of kidney, acquired     Dyshidrosis     Dysthymic disorder     Elevated white blood cell count     Essential (primary) hypertension     GERD (gastroesophageal reflux disease)     About 10 years ago    Heartburn     Hidradenitis     History of medical problems     My brother passed on at 51 canc    HL (hearing loss) Feb 2021    Ringing in ears mild    HLD (hyperlipidemia)     Low back pain     Nicotine dependence, unspecified, uncomplicated     Obesity 20 years ago    Cant loose bit dont gain    Other specified menopausal and perimenopausal disorders     Overweight     Personal history of other diseases of urinary " system     Proteinuria     Renal insufficiency     I have a left kidney only with a cyst in it    Solitary kidney     Type 2 diabetes mellitus without complication     Visual impairment Feb 2021    Noticed i some times get blurry vision         Past Surgical History:   Procedure Laterality Date    CHOLECYSTECTOMY      HYSTERECTOMY      In 30s    SUBTOTAL HYSTERECTOMY      18 years ago i still have ovaries    TUBAL ABDOMINAL LIGATION Bilateral           Social History     Socioeconomic History    Marital status:     Number of children: 3   Tobacco Use    Smoking status: Former     Packs/day: 1.00     Years: 30.00     Additional pack years: 0.00     Total pack years: 30.00     Types: Cigarettes    Smokeless tobacco: Never    Tobacco comments:     Tried everything to quit   Vaping Use    Vaping Use: Never used   Substance and Sexual Activity    Alcohol use: Not Currently     Comment: I probably have a taste once or twice a year    Drug use: Never    Sexual activity: Yes     Partners: Male     Birth control/protection: Post-menopausal     Comment: Its getting  to not be pleasant  or pleasurable         Family History   Problem Relation Age of Onset    Hypothyroidism Mother     Anxiety disorder Mother         Hers is really bad    Depression Mother     Diabetes Mother     Hearing loss Mother     Hyperlipidemia Mother     Miscarriages / Stillbirths Mother         Still birth at almost 9 month's gestation    Thyroid disease Mother     Vision loss Mother     Diabetes type II Father     Heart failure Father     Alcohol abuse Father         He became bipolar when he drank    Arthritis Father     Asthma Father     COPD Father     Depression Father     Diabetes Father     Heart disease Father         Congestive heart failure/ pacemaker    Hyperlipidemia Father     Mental illness Father         Bipolar/paranoid scitsofrania    Vision loss Father     Cancer Brother         Lymphoma/ bone    Early death Brother          Found cancer past 7 to 8 weeks later    Hearing loss Brother     Vision loss Brother     Cancer Maternal Grandfather         Passed away of Throat cancer    Cancer Maternal Grandmother         Pancreatic cancer    COPD Paternal Grandfather          from it    Diabetes Paternal Grandmother     Stroke Paternal Grandmother         Its what made her pass away    Anxiety disorder Sister         Hers is bad    Arthritis Sister     Asthma Sister     Depression Sister     Diabetes Sister     Learning disabilities Sister         Adhd    Vision loss Sister     Cancer Brother         Kidney cancer / healed no more cancer    Diabetes Brother     Hearing loss Brother     Vision loss Brother           Allergies   Allergen Reactions    Demerol [Meperidine] Unknown - High Severity    Lisinopril Unknown - Low Severity    Morphine Unknown - High Severity            Current Outpatient Medications:     Accu-Chek Guide test strip, 1 each by Other route 2 (Two) Times a Day. use to check glucose 3 times daily, Disp: 200 each, Rfl: 2    Accu-Chek Softclix Lancets lancets, USE 1 UNIT TO CHECK GLUCOSE ONCE DAILY, Disp: , Rfl:     Acne Medication 5 5 % gel, APPLY 1 APPLICATION TOPICALLY TO THE APPROPRIATE AREA AS DIRECTED DAILY., Disp: 42.5 g, Rfl: 2    betamethasone valerate (VALISONE) 0.1 % ointment, Apply  topically to the appropriate area as directed 2 (Two) Times a Day., Disp: 45 g, Rfl: 0    Blood Glucose Monitoring Suppl (Accu-Chek Guide) w/Device kit, USE UNIT TO CHECK GLUCOSE THREE TIMES DAILY, Disp: , Rfl:     estradiol (ESTRACE) 0.1 MG/GM vaginal cream, Insert 0.5 g into the vagina 2 (Two) Times a Week., Disp: 42.5 g, Rfl: 5    gentamicin (GARAMYCIN) 0.1 % ointment, Apply 1 application  topically to the appropriate area as directed 3 (Three) Times a Day., Disp: 30 g, Rfl: 0    metFORMIN ER (GLUCOPHAGE-XR) 500 MG 24 hr tablet, TAKE 1 TABLET BY MOUTH EVERY DAY WITH BREAKFAST, Disp: 90 tablet, Rfl: 1    nicotine (Nicoderm CQ) 21  "MG/24HR patch, Place 1 patch on the skin as directed by provider Daily., Disp: 21 patch, Rfl: 2    omeprazole (priLOSEC) 40 MG capsule, Take 1 capsule by mouth Daily., Disp: 90 capsule, Rfl: 1    ondansetron ODT (ZOFRAN-ODT) 4 MG disintegrating tablet, Place 1 tablet on the tongue Every 8 (Eight) Hours As Needed for Nausea or Vomiting., Disp: 20 tablet, Rfl: 0    rosuvastatin (CRESTOR) 10 MG tablet, Take 1 tablet by mouth Daily., Disp: 90 tablet, Rfl: 1    Semaglutide, 2 MG/DOSE, (Ozempic, 2 MG/DOSE,) 8 MG/3ML solution pen-injector, Inject 2 mg under the skin into the appropriate area as directed 1 (One) Time Per Week., Disp: 3 mL, Rfl: 2    venlafaxine XR (EFFEXOR-XR) 75 MG 24 hr capsule, Take 1 capsule by mouth Daily for 180 days., Disp: 90 capsule, Rfl: 1      ROS:  Cardiac review of systems positive for atypical chest pain, dyspnea on exertion, palpitations    Objective     /89   Pulse 77   Ht 157.5 cm (62\")   Wt 80.3 kg (177 lb)   BMI 32.37 kg/m²       General Appearance:   well developed  well nourished  HENT:   oropharynx moist  lips not cyanotic  Respiratory:  no respiratory distress  normal breath sounds  no rales  Cardiovascular:  no jugular venous distention  regular rhythm  S1 normal, S2 normal  no S3, no S4   no murmur  no rub, no thrill  No carotid bruit  pedal pulses normal  lower extremity edema: none    Musculoskeletal:  no clubbing of fingers.   normocephalic, head atraumatic  Skin:   warm, dry  Psychiatric:  judgement and insight appropriate  normal mood and affect    ECHO:    STRESS:    CATH:  No results found for this or any previous visit.    BMP:     Glucose   Date Value Ref Range Status   03/15/2023 107 (H) 65 - 99 mg/dL Final     BUN   Date Value Ref Range Status   03/15/2023 15 6 - 20 mg/dL Final     Creatinine   Date Value Ref Range Status   03/15/2023 0.92 0.57 - 1.00 mg/dL Final     Sodium   Date Value Ref Range Status   03/15/2023 138 136 - 145 mmol/L Final     Potassium "   Date Value Ref Range Status   03/15/2023 4.6 3.5 - 5.2 mmol/L Final     Chloride   Date Value Ref Range Status   03/15/2023 101 98 - 107 mmol/L Final     CO2   Date Value Ref Range Status   03/15/2023 29.0 22.0 - 29.0 mmol/L Final     Calcium   Date Value Ref Range Status   03/15/2023 9.5 8.6 - 10.5 mg/dL Final     BUN/Creatinine Ratio   Date Value Ref Range Status   03/15/2023 16.3 7.0 - 25.0 Final     Anion Gap   Date Value Ref Range Status   03/15/2023 8.0 5.0 - 15.0 mmol/L Final     eGFR   Date Value Ref Range Status   03/15/2023 75.1 >60.0 mL/min/1.73 Final     LIPIDS:  Total Cholesterol   Date Value Ref Range Status   03/15/2023 172 0 - 200 mg/dL Final     Triglycerides   Date Value Ref Range Status   03/15/2023 131 0 - 150 mg/dL Final     HDL Cholesterol   Date Value Ref Range Status   03/15/2023 46 40 - 60 mg/dL Final     LDL Cholesterol    Date Value Ref Range Status   03/15/2023 103 (H) 0 - 100 mg/dL Final     VLDL Cholesterol   Date Value Ref Range Status   03/15/2023 23 5 - 40 mg/dL Final     LDL/HDL Ratio   Date Value Ref Range Status   03/15/2023 2.17  Final         Procedures             ASSESSMENT:  Diagnoses and all orders for this visit:    1. Dyspnea on exertion (Primary)  -     Stress Test With Myocardial Perfusion One Day; Future    2. Chest pain, unspecified type  -     Stress Test With Myocardial Perfusion One Day; Future    3. Mixed hyperlipidemia    4. Palpitations         PLAN:    1.  Patient's episode of palpitations was probably consistent with an SVT.  Her watch estimated her heart rate in the 200 bpm range.  She has not had this since.  I did describe to her the Valsalva maneuver if it occurs again.  If it does occur again she will call us and we will consider placing an event recorder at that time.  2.  For patient's dyspnea on exertion and atypical chest pain along with multiple risk factors including strong family history I have asked her to get a Cardiolite stress test.  3.   Patient's last A1c was 6.8.  4.  Continue the Crestor.  Patient had cholesterol checked 3/15/2023 with , HDL 46, and triglycerides 131.  These are under good control.  5.  If the stress test looks normal at that time we are going to ask her to start a regular exercise program walking 30 minutes 5 days a week.  6.  Encouraged continued smoking cessation.      Return in about 6 weeks (around 1/22/2024) for vielka feng.     Patient was given instructions and counseling regarding her condition or for health maintenance advice. Please see specific information pulled into the AVS if appropriate.         Tushar St MD   12/11/2023  14:25 EST

## 2023-12-15 ENCOUNTER — PATIENT ROUNDING (BHMG ONLY) (OUTPATIENT)
Dept: CARDIOLOGY | Facility: CLINIC | Age: 53
End: 2023-12-15
Payer: COMMERCIAL

## 2024-01-08 ENCOUNTER — OFFICE VISIT (OUTPATIENT)
Dept: FAMILY MEDICINE CLINIC | Age: 54
End: 2024-01-08
Payer: COMMERCIAL

## 2024-01-08 VITALS
DIASTOLIC BLOOD PRESSURE: 92 MMHG | HEART RATE: 96 BPM | HEIGHT: 62 IN | OXYGEN SATURATION: 98 % | WEIGHT: 175.6 LBS | BODY MASS INDEX: 32.31 KG/M2 | SYSTOLIC BLOOD PRESSURE: 138 MMHG

## 2024-01-08 DIAGNOSIS — R53.83 OTHER FATIGUE: ICD-10-CM

## 2024-01-08 DIAGNOSIS — E11.9 TYPE 2 DIABETES MELLITUS WITHOUT COMPLICATION, WITHOUT LONG-TERM CURRENT USE OF INSULIN: Primary | ICD-10-CM

## 2024-01-08 DIAGNOSIS — E55.9 VITAMIN D DEFICIENCY: ICD-10-CM

## 2024-01-08 DIAGNOSIS — Z78.0 MENOPAUSE: ICD-10-CM

## 2024-01-08 DIAGNOSIS — R12 HEARTBURN: ICD-10-CM

## 2024-01-08 DIAGNOSIS — Z72.0 TOBACCO ABUSE: ICD-10-CM

## 2024-01-08 DIAGNOSIS — Z23 ENCOUNTER FOR IMMUNIZATION: ICD-10-CM

## 2024-01-08 DIAGNOSIS — E11.9 TYPE 2 DIABETES MELLITUS WITHOUT COMPLICATION, WITHOUT LONG-TERM CURRENT USE OF INSULIN: ICD-10-CM

## 2024-01-08 DIAGNOSIS — E78.2 MIXED HYPERLIPIDEMIA: ICD-10-CM

## 2024-01-08 PROCEDURE — 99214 OFFICE O/P EST MOD 30 MIN: CPT | Performed by: FAMILY MEDICINE

## 2024-01-08 RX ORDER — METFORMIN HYDROCHLORIDE 500 MG/1
500 TABLET, EXTENDED RELEASE ORAL
Qty: 90 TABLET | Refills: 1 | Status: SHIPPED | OUTPATIENT
Start: 2024-01-08

## 2024-01-08 RX ORDER — OMEPRAZOLE 40 MG/1
40 CAPSULE, DELAYED RELEASE ORAL DAILY
Qty: 90 CAPSULE | Refills: 1 | Status: SHIPPED | OUTPATIENT
Start: 2024-01-08

## 2024-01-08 RX ORDER — ROSUVASTATIN CALCIUM 10 MG/1
10 TABLET, COATED ORAL DAILY
Qty: 90 TABLET | Refills: 1 | Status: SHIPPED | OUTPATIENT
Start: 2024-01-08

## 2024-01-08 RX ORDER — SEMAGLUTIDE 2.68 MG/ML
2 INJECTION, SOLUTION SUBCUTANEOUS WEEKLY
Qty: 3 ML | Refills: 2 | Status: SHIPPED | OUTPATIENT
Start: 2024-01-08

## 2024-01-08 RX ORDER — VENLAFAXINE HYDROCHLORIDE 75 MG/1
75 CAPSULE, EXTENDED RELEASE ORAL DAILY
Qty: 90 CAPSULE | Refills: 1 | Status: SHIPPED | OUTPATIENT
Start: 2024-01-08 | End: 2024-07-06

## 2024-01-08 RX ORDER — TRIAMCINOLONE ACETONIDE 1 MG/G
OINTMENT TOPICAL
COMMUNITY
Start: 2023-12-17

## 2024-01-08 NOTE — PROGRESS NOTES
Caitlin Escalera presents to [facility].    Chief Complaint:   diabetes follow up    Subjective     History of Present Illness:  Diabetes  Pertinent negatives for hypoglycemia include no dizziness or nervousness/anxiousness. Pertinent negatives for diabetes include no blurred vision, no chest pain and no fatigue.        She presents for f/u for her essential (primary) hypertension,   current nonpharmacologic treatment includes low sodium diet and exercise.  Her current cardiac medication regimen includes an ACE inhibitor.  Compliance with treatment has been good; she takes her medication as directed.  She is tolerating the medication well without side effects.  Home BP checks are good.     She also has type 2 diabetes mellitus without complications. She reports home blood glucose readings have been excellent, with average fasting glucoses running <120 mg/dL.   On low dose metformin 1- 500 mg daily and no diarrhea (higher doses cause diarrhea), and ozempic 1 mg weekly (typically is on 2 mg but unable to get dosing so had to decrease to 1 mg), doing well, has lost 37#s total..   Recommend yearly eye exams, she has no no diabetic retinopathy, lazy R eye, h/o  L eye retinal bleed.  Denies depression/anxiety on venlafexine and she is happy and thriving, her anxiety is stable and well-controlled.  She is sleeping well at night..             She presents with mixed hyperlipidemia, she is on atorvastatin 40 mg daily and tolerates med well.  She is due for labs.  Will check labs and adjust medication pending results     Chronic GERD and she is stable on omeprazole 40 mg nightly and tolerates medication well    Caitlin has intermittent rash, and she is doing better, she has an epoxy allergic reaction with contact dermatitis. She is using OTC hydrocortisone cream, Benadryl cream and topical cerave lotion.    She is trying to quit smoking and has had contact dermatitis with the nicotine patches.   "Rec she try nicorette gum           Result Review   The following data was reviewed by Rosalina Woodard MD on 01/08/2024.  Lab Results   Component Value Date    WBC 7.90 03/15/2023    HGB 16.0 (H) 03/15/2023    HCT 46.5 03/15/2023    MCV 91.9 03/15/2023     03/15/2023     Lab Results   Component Value Date    GLUCOSE 107 (H) 03/15/2023    BUN 15 03/15/2023    CREATININE 0.92 03/15/2023    EGFR 75.1 03/15/2023    BCR 16.3 03/15/2023    K 4.6 03/15/2023    CO2 29.0 03/15/2023    CALCIUM 9.5 03/15/2023    ALBUMIN 4.3 03/15/2023    BILITOT 0.5 03/15/2023    AST 16 03/15/2023    ALT 21 03/15/2023     Lab Results   Component Value Date    CHOL 172 03/15/2023    CHLPL 197 01/11/2021    TRIG 131 03/15/2023    HDL 46 03/15/2023     (H) 03/15/2023     Lab Results   Component Value Date    TSH 1.890 12/14/2021     Lab Results   Component Value Date    HGBA1C 6.80 (H) 03/15/2023     No results found for: \"PSA\"      Lab Results   Component Value Date    KKAF88YZ 31.8 10/06/2022               Assessment and Plan:   Diagnoses and all orders for this visit:    1. Type 2 diabetes mellitus without complication, without long-term current use of insulin (Primary)  Comments:  stable and well controlled on ozempic and metformin, taco meds well, BS running < 130, recc yearly eye exam, foot exam WNL-callus noted  Orders:  -     Comprehensive Metabolic Panel; Future  -     Lipid Panel; Future  -     Hemoglobin A1c; Future  -     Microalbumin / Creatinine Urine Ratio - Urine, Clean Catch; Future  -     metFORMIN ER (GLUCOPHAGE-XR) 500 MG 24 hr tablet; Take 1 tablet by mouth Daily With Breakfast.  Dispense: 90 tablet; Refill: 1  -     Semaglutide, 2 MG/DOSE, (Ozempic, 2 MG/DOSE,) 8 MG/3ML solution pen-injector; Inject 2 mg under the skin into the appropriate area as directed 1 (One) Time Per Week.  Dispense: 3 mL; Refill: 2    2. Vitamin D deficiency  Comments:  not on vitamin D supplementation  Orders:  -     Vitamin " D,25-Hydroxy; Future    3. Tobacco abuse  Comments:  She did not tolerate the nicotine patch.  Recommend Nicorette gum instead and 1 800 quit now.  She did not tolerate BuSpar    4. Type 2 diabetes mellitus without complication, without long-term current use of insulin  Comments:  Pt is stable on ozempic, tolerating medications well, no changes are needed in current meds or treatment plan, eye exam UTD, foot exam noted above  Orders:  -     Comprehensive Metabolic Panel; Future  -     Lipid Panel; Future  -     Hemoglobin A1c; Future  -     Microalbumin / Creatinine Urine Ratio - Urine, Clean Catch; Future  -     metFORMIN ER (GLUCOPHAGE-XR) 500 MG 24 hr tablet; Take 1 tablet by mouth Daily With Breakfast.  Dispense: 90 tablet; Refill: 1  -     Semaglutide, 2 MG/DOSE, (Ozempic, 2 MG/DOSE,) 8 MG/3ML solution pen-injector; Inject 2 mg under the skin into the appropriate area as directed 1 (One) Time Per Week.  Dispense: 3 mL; Refill: 2    5. Heartburn  Comments:  stable on omeprazole, meds refilled  Orders:  -     omeprazole (priLOSEC) 40 MG capsule; Take 1 capsule by mouth Daily.  Dispense: 90 capsule; Refill: 1    6. Mixed hyperlipidemia  Comments:  Stable on rosuvastatin, tolerates medication well.  We will check labs in 6 mo and adjust medication pending labs  Orders:  -     rosuvastatin (CRESTOR) 10 MG tablet; Take 1 tablet by mouth Daily.  Dispense: 90 tablet; Refill: 1    7. Menopause  Comments:  Stable and doing very well on Effexor to 75 mg daily  Orders:  -     venlafaxine XR (EFFEXOR-XR) 75 MG 24 hr capsule; Take 1 capsule by mouth Daily for 180 days.  Dispense: 90 capsule; Refill: 1    8. Other fatigue  Comments:  Will check a B12, CBC and TSH for her fatigue  Orders:  -     Vitamin B12; Future  -     CBC No Differential; Future    9. Encounter for immunization  Comments:  Recommend shingles vaccine, flu vaccine and COVID booster.  Orders:  -     Cancel: Shingrix Vaccine              Objective  "    Medications:  Current Outpatient Medications   Medication Instructions    Accu-Chek Guide test strip 1 each, Other, 2 Times Daily, use to check glucose 3 times daily    Accu-Chek Softclix Lancets lancets USE 1 UNIT TO CHECK GLUCOSE ONCE DAILY    Acne Medication 5 5 % gel APPLY 1 APPLICATION TOPICALLY TO THE APPROPRIATE AREA AS DIRECTED DAILY.    betamethasone valerate (VALISONE) 0.1 % ointment Topical, 2 Times Daily    Blood Glucose Monitoring Suppl (Accu-Chek Guide) w/Device kit USE UNIT TO CHECK GLUCOSE THREE TIMES DAILY    estradiol (ESTRACE) 0.5 g, Vaginal, 2 Times Weekly    gentamicin (GARAMYCIN) 0.1 % ointment 1 application , Topical, 3 Times Daily    metFORMIN ER (GLUCOPHAGE-XR) 500 mg, Oral, Daily With Breakfast    omeprazole (PRILOSEC) 40 mg, Oral, Daily    ondansetron ODT (ZOFRAN-ODT) 4 mg, Translingual, Every 8 Hours PRN    Ozempic (2 MG/DOSE) 2 mg, Subcutaneous, Weekly    rosuvastatin (CRESTOR) 10 mg, Oral, Daily    triamcinolone (KENALOG) 0.1 % ointment APPLY TO AFFECTED AREA TWICE A DAY INTO RIGHT EAR    venlafaxine XR (EFFEXOR-XR) 75 mg, Oral, Daily        Vital Signs:   /92 (BP Location: Right arm, Patient Position: Sitting)   Pulse 96   Ht 157.5 cm (62.01\")   Wt 79.7 kg (175 lb 9.6 oz)   SpO2 98%   BMI 32.11 kg/m²             Physical Exam:  Physical Exam  Vitals and nursing note reviewed.   Constitutional:       General: She is not in acute distress.     Appearance: Normal appearance. She is not ill-appearing, toxic-appearing or diaphoretic.   HENT:      Head: Normocephalic and atraumatic.      Right Ear: Tympanic membrane, ear canal and external ear normal.      Left Ear: Tympanic membrane, ear canal and external ear normal.      Nose: No congestion or rhinorrhea.      Mouth/Throat:      Mouth: Mucous membranes are moist.      Pharynx: Oropharynx is clear. No oropharyngeal exudate or posterior oropharyngeal erythema.   Eyes:      Extraocular Movements: Extraocular movements intact. "      Conjunctiva/sclera: Conjunctivae normal.      Pupils: Pupils are equal, round, and reactive to light.   Cardiovascular:      Rate and Rhythm: Normal rate and regular rhythm.      Pulses:           Dorsalis pedis pulses are 2+ on the right side and 2+ on the left side.      Heart sounds: Normal heart sounds.   Pulmonary:      Effort: Pulmonary effort is normal.      Breath sounds: Normal breath sounds. No wheezing, rhonchi or rales.   Abdominal:      General: Abdomen is flat.      Palpations: Abdomen is soft. There is no mass.      Tenderness: There is no abdominal tenderness.      Hernia: No hernia is present.   Musculoskeletal:      Cervical back: Neck supple. No rigidity.      Right lower leg: No edema.      Left lower leg: No edema.   Feet:      Right foot:      Protective Sensation: 7 sites tested.  7 sites sensed.      Skin integrity: Skin integrity normal. No ulcer or blister.      Toenail Condition: Right toenails are normal.      Left foot:      Protective Sensation: 7 sites tested.  7 sites sensed.      Skin integrity: Skin integrity normal. No ulcer or blister.      Toenail Condition: Left toenails are normal.      Comments: Diabetic Foot Exam Performed and Monofilament Test Performed     Lymphadenopathy:      Cervical: No cervical adenopathy.   Skin:     General: Skin is warm and dry.   Neurological:      General: No focal deficit present.      Mental Status: She is alert and oriented to person, place, and time. Mental status is at baseline.   Psychiatric:         Mood and Affect: Mood normal.         Behavior: Behavior normal.         Thought Content: Thought content normal.         Judgment: Judgment normal.           Review of Systems:  Review of Systems   Constitutional:  Negative for chills, fatigue and fever.   HENT:  Negative for ear pain, sinus pressure and sore throat.    Eyes:  Negative for blurred vision and double vision.   Respiratory:  Negative for cough, shortness of breath and  wheezing.    Cardiovascular:  Negative for chest pain, palpitations and leg swelling.   Gastrointestinal:  Negative for abdominal pain, blood in stool, constipation, diarrhea, nausea and vomiting.   Neurological:  Negative for dizziness and headache.   Psychiatric/Behavioral:  Negative for sleep disturbance, suicidal ideas and depressed mood. The patient is not nervous/anxious.               Follow Up   Return in about 6 months (around 7/8/2024) for Annual physical.    Part of this note may be an electronic transcription/translation of spoken language to printed   text using the Dragon Dictation System.            Medical History:  Medications Discontinued During This Encounter   Medication Reason    nicotine (Nicoderm CQ) 21 MG/24HR patch *Therapy completed    omeprazole (priLOSEC) 40 MG capsule Reorder    rosuvastatin (CRESTOR) 10 MG tablet Reorder    venlafaxine XR (EFFEXOR-XR) 75 MG 24 hr capsule Reorder    Semaglutide, 2 MG/DOSE, (Ozempic, 2 MG/DOSE,) 8 MG/3ML solution pen-injector Reorder    metFORMIN ER (GLUCOPHAGE-XR) 500 MG 24 hr tablet Reorder      Past Medical History:    Allergic    Allergic to morphine/ demerol    Anxiety    Sometimes    Arthritis    I had a bone density 10 years ago  and a xray afew month ago    Asthma    I get short of breath easy    Cholelithiasis    Removed on 2/3/88    Cyst of kidney, acquired    Dyshidrosis    Dysthymic disorder    Elevated white blood cell count    Essential (primary) hypertension    GERD (gastroesophageal reflux disease)    About 10 years ago    Heartburn    Hidradenitis    History of medical problems    My brother passed on at 51 canc    HL (hearing loss)    Ringing in ears mild    HLD (hyperlipidemia)    Low back pain    Nicotine dependence, unspecified, uncomplicated    Obesity    Cant loose bit dont gain    Other specified menopausal and perimenopausal disorders    Overweight    Personal history of other diseases of urinary system    Proteinuria    Renal  insufficiency    I have a left kidney only with a cyst in it    Solitary kidney    Type 2 diabetes mellitus without complication    Visual impairment    Noticed i some times get blurry vision     Past Surgical History:    CHOLECYSTECTOMY    HYSTERECTOMY    In 30s    SUBTOTAL HYSTERECTOMY    18 years ago i still have ovaries    TUBAL ABDOMINAL LIGATION      Family History   Problem Relation Age of Onset    Hypothyroidism Mother     Anxiety disorder Mother         Hers is really bad    Depression Mother     Diabetes Mother     Hearing loss Mother     Hyperlipidemia Mother     Miscarriages / Stillbirths Mother         Still birth at almost 9 month's gestation    Thyroid disease Mother     Vision loss Mother     Diabetes type II Father     Heart failure Father     Alcohol abuse Father         He became bipolar when he drank    Arthritis Father     Asthma Father     COPD Father     Depression Father     Diabetes Father     Heart disease Father         Congestive heart failure/ pacemaker    Hyperlipidemia Father     Mental illness Father         Bipolar/paranoid scitsofrania    Vision loss Father     Cancer Brother         Lymphoma/ bone    Early death Brother         Found cancer past 7 to 8 weeks later    Hearing loss Brother     Vision loss Brother     Cancer Maternal Grandfather         Passed away of Throat cancer    Cancer Maternal Grandmother         Pancreatic cancer    COPD Paternal Grandfather          from it    Diabetes Paternal Grandmother     Stroke Paternal Grandmother         Its what made her pass away    Anxiety disorder Sister         Hers is bad    Arthritis Sister     Asthma Sister     Depression Sister     Diabetes Sister     Learning disabilities Sister         Adhd    Vision loss Sister     Cancer Brother         Kidney cancer / healed no more cancer    Diabetes Brother     Hearing loss Brother     Vision loss Brother      Social History     Tobacco Use    Smoking status: Every Day      Packs/day: 0.50     Years: 30.00     Additional pack years: 0.00     Total pack years: 15.00     Types: Cigarettes    Smokeless tobacco: Never    Tobacco comments:     Tried everything to quit   Substance Use Topics    Alcohol use: Not Currently     Comment: I probably have a taste once or twice a year       Health Maintenance Due   Topic Date Due    MAMMOGRAM  11/17/2022    HEMOGLOBIN A1C  09/15/2023        Immunization History   Administered Date(s) Administered    Fluzone (or Fluarix & Flulaval for VFC) >6mos 09/18/2017    Fluzone Quad >6mos (Multi-dose) 09/01/2017    Hepatitis B Adult/Adolescent IM 04/07/2011, 05/17/2011, 10/22/2012    MMR 04/07/2011    Td (TDVAX) 09/29/1998, 04/07/2011    Tdap 08/30/2022       Allergies   Allergen Reactions    Demerol [Meperidine] Unknown - High Severity    Lisinopril Unknown - Low Severity    Morphine Unknown - High Severity

## 2024-02-07 ENCOUNTER — TELEPHONE (OUTPATIENT)
Dept: FAMILY MEDICINE CLINIC | Age: 54
End: 2024-02-07
Payer: COMMERCIAL

## 2024-02-07 NOTE — LETTER
February 12, 2024    Caitlin Escalera  [address] [facility] [address]      Dear MsMiranda Jw    Just a friendly reminder you have active lab orders pending from your last office visit.  These orders are in your chart, simply come to the lab at your convenience between 7am-6pm Mon-Fri to have these completed. No appointment is needed. These are fasting labs meaning nothing to eat or drink after midnight the night before your test; however you may drink all the water or black coffee you would like the morning of your lab work.        Thank you,     [facility]     Rosalina Woodard MD

## 2024-02-23 ENCOUNTER — LAB (OUTPATIENT)
Dept: LAB | Facility: HOSPITAL | Age: 54
End: 2024-02-23
Payer: COMMERCIAL

## 2024-02-23 DIAGNOSIS — R53.83 OTHER FATIGUE: ICD-10-CM

## 2024-02-23 DIAGNOSIS — E55.9 VITAMIN D DEFICIENCY: ICD-10-CM

## 2024-02-23 DIAGNOSIS — E11.9 TYPE 2 DIABETES MELLITUS WITHOUT COMPLICATION, WITHOUT LONG-TERM CURRENT USE OF INSULIN: ICD-10-CM

## 2024-02-23 LAB
25(OH)D3 SERPL-MCNC: 20.4 NG/ML (ref 30–100)
ALBUMIN SERPL-MCNC: 4.1 G/DL (ref 3.5–5.2)
ALBUMIN UR-MCNC: 43.2 MG/DL
ALBUMIN/GLOB SERPL: 1.5 G/DL
ALP SERPL-CCNC: 89 U/L (ref 39–117)
ALT SERPL W P-5'-P-CCNC: 22 U/L (ref 1–33)
ANION GAP SERPL CALCULATED.3IONS-SCNC: 13.4 MMOL/L (ref 5–15)
AST SERPL-CCNC: 20 U/L (ref 1–32)
BILIRUB SERPL-MCNC: 0.5 MG/DL (ref 0–1.2)
BUN SERPL-MCNC: 15 MG/DL (ref 6–20)
BUN/CREAT SERPL: 14.9 (ref 7–25)
CALCIUM SPEC-SCNC: 9.8 MG/DL (ref 8.6–10.5)
CHLORIDE SERPL-SCNC: 101 MMOL/L (ref 98–107)
CHOLEST SERPL-MCNC: 194 MG/DL (ref 0–200)
CO2 SERPL-SCNC: 26.6 MMOL/L (ref 22–29)
CREAT SERPL-MCNC: 1.01 MG/DL (ref 0.57–1)
CREAT UR-MCNC: 31.7 MG/DL
DEPRECATED RDW RBC AUTO: 43.4 FL (ref 37–54)
EGFRCR SERPLBLD CKD-EPI 2021: 66.7 ML/MIN/1.73
ERYTHROCYTE [DISTWIDTH] IN BLOOD BY AUTOMATED COUNT: 12.4 % (ref 12.3–15.4)
GLOBULIN UR ELPH-MCNC: 2.7 GM/DL
GLUCOSE SERPL-MCNC: 171 MG/DL (ref 65–99)
HBA1C MFR BLD: 8.5 % (ref 4.8–5.6)
HCT VFR BLD AUTO: 48 % (ref 34–46.6)
HDLC SERPL-MCNC: 45 MG/DL (ref 40–60)
HGB BLD-MCNC: 16.4 G/DL (ref 12–15.9)
LDLC SERPL CALC-MCNC: 116 MG/DL (ref 0–100)
LDLC/HDLC SERPL: 2.46 {RATIO}
MCH RBC QN AUTO: 31.9 PG (ref 26.6–33)
MCHC RBC AUTO-ENTMCNC: 34.2 G/DL (ref 31.5–35.7)
MCV RBC AUTO: 93.4 FL (ref 79–97)
MICROALBUMIN/CREAT UR: 1362.8 MG/G (ref 0–29)
PLATELET # BLD AUTO: 260 10*3/MM3 (ref 140–450)
PMV BLD AUTO: 8.6 FL (ref 6–12)
POTASSIUM SERPL-SCNC: 5.3 MMOL/L (ref 3.5–5.2)
PROT SERPL-MCNC: 6.8 G/DL (ref 6–8.5)
RBC # BLD AUTO: 5.14 10*6/MM3 (ref 3.77–5.28)
SODIUM SERPL-SCNC: 141 MMOL/L (ref 136–145)
TRIGL SERPL-MCNC: 191 MG/DL (ref 0–150)
VIT B12 BLD-MCNC: 376 PG/ML (ref 211–946)
VLDLC SERPL-MCNC: 33 MG/DL (ref 5–40)
WBC NRBC COR # BLD AUTO: 9.23 10*3/MM3 (ref 3.4–10.8)

## 2024-02-23 PROCEDURE — 83036 HEMOGLOBIN GLYCOSYLATED A1C: CPT

## 2024-02-23 PROCEDURE — 80053 COMPREHEN METABOLIC PANEL: CPT

## 2024-02-23 PROCEDURE — 82306 VITAMIN D 25 HYDROXY: CPT

## 2024-02-23 PROCEDURE — 36415 COLL VENOUS BLD VENIPUNCTURE: CPT

## 2024-02-23 PROCEDURE — 85027 COMPLETE CBC AUTOMATED: CPT

## 2024-02-23 PROCEDURE — 82570 ASSAY OF URINE CREATININE: CPT

## 2024-02-23 PROCEDURE — 82607 VITAMIN B-12: CPT

## 2024-02-23 PROCEDURE — 80061 LIPID PANEL: CPT

## 2024-02-23 PROCEDURE — 82043 UR ALBUMIN QUANTITATIVE: CPT

## 2024-02-24 DIAGNOSIS — L20.9 ATOPIC DERMATITIS, UNSPECIFIED TYPE: ICD-10-CM

## 2024-03-01 ENCOUNTER — TELEPHONE (OUTPATIENT)
Dept: FAMILY MEDICINE CLINIC | Age: 54
End: 2024-03-01
Payer: COMMERCIAL

## 2024-03-01 DIAGNOSIS — E11.9 TYPE 2 DIABETES MELLITUS WITHOUT COMPLICATION, WITHOUT LONG-TERM CURRENT USE OF INSULIN: ICD-10-CM

## 2024-03-01 DIAGNOSIS — E87.5 SERUM POTASSIUM ELEVATED: ICD-10-CM

## 2024-03-01 DIAGNOSIS — E11.9 TYPE 2 DIABETES MELLITUS WITHOUT COMPLICATION, WITHOUT LONG-TERM CURRENT USE OF INSULIN: Primary | ICD-10-CM

## 2024-03-01 DIAGNOSIS — E55.9 VITAMIN D DEFICIENCY: Primary | ICD-10-CM

## 2024-03-01 RX ORDER — GLIPIZIDE 5 MG/1
5 TABLET, FILM COATED, EXTENDED RELEASE ORAL DAILY
Qty: 90 TABLET | Refills: 1 | Status: SHIPPED | OUTPATIENT
Start: 2024-03-01

## 2024-03-01 RX ORDER — ERGOCALCIFEROL 1.25 MG/1
50000 CAPSULE ORAL WEEKLY
Qty: 13 CAPSULE | Refills: 0 | Status: SHIPPED | OUTPATIENT
Start: 2024-03-01

## 2024-03-01 NOTE — TELEPHONE ENCOUNTER
Patient is calling back stating that insurance is requesting for prior authorization for the Dexa com. 90 DAY SUPPLY.   FAX- [phone]

## 2024-03-01 NOTE — TELEPHONE ENCOUNTER
Caller: Caitlin Escalera    Relationship: Self    Best call back number: [phone]    What is the best time to reach you: ANYTIME     Who are you requesting to speak with (clinical staff, provider,  specific staff member): CLINICAL         What was the call regarding:       THE PATIENT SAID SHE RECEIVED A MESSAGE FROM PCP PADMINI  THAT SHE WILL BE NEEDING A DIABETIC CONTINUOUS MONITOR. SHE IS WANTING TO KNOW WHEN SHE WILL BE ABLE TO RECEIVE IT.         [id number] [place] - [address] - [phone] [facility] [phone]  [phone]     PLEASE ADVISE PATIENT

## 2024-03-04 DIAGNOSIS — E11.9 TYPE 2 DIABETES MELLITUS WITHOUT COMPLICATION, WITHOUT LONG-TERM CURRENT USE OF INSULIN: Primary | ICD-10-CM

## 2024-03-04 NOTE — TELEPHONE ENCOUNTER
I will set up a referral to PA [facility]YA but it is just an initial screening DEXA for menopause and her insurance may not be willing to cover it at this time.  In addition I will need to refer her to our diabetic educator to get her initiated on the continuous glucose monitoring.  Referral placed.  Dr. Woodard

## 2024-03-05 DIAGNOSIS — E11.9 TYPE 2 DIABETES MELLITUS WITHOUT COMPLICATION, WITHOUT LONG-TERM CURRENT USE OF INSULIN: Primary | ICD-10-CM

## 2024-03-05 RX ORDER — FLASH GLUCOSE SCANNING READER
1 EACH MISCELLANEOUS ONCE
Qty: 1 EACH | Refills: 0 | Status: SHIPPED | OUTPATIENT
Start: 2024-03-05 | End: 2024-03-05

## 2024-03-05 RX ORDER — FLASH GLUCOSE SENSOR
1 KIT MISCELLANEOUS
Qty: 6 EACH | Refills: 3 | Status: SHIPPED | OUTPATIENT
Start: 2024-03-05

## 2024-03-08 ENCOUNTER — TELEPHONE (OUTPATIENT)
Dept: FAMILY MEDICINE CLINIC | Age: 54
End: 2024-03-08
Payer: COMMERCIAL

## 2024-03-08 NOTE — TELEPHONE ENCOUNTER
PATIENT CALLED BACK AND IS STATING AS LONG AS A PA IS SUBMITTED PATIENT WILL BE ABLE TO GET THE FREE STYLE AT ABOUT A CO PAY OF  $30.00 REF NUMBER [id number].

## 2024-03-08 NOTE — TELEPHONE ENCOUNTER
Caller: Caitlin Escalera    Relationship: Self    Best call back number: [phone]     What medication are you requesting: ALTERNATIVE TO FREESTYLE MARILYN MONITOR    What are your current symptoms: PHARMACY CAN'T GET THIS MONITOR    If a prescription is needed, what is your preferred pharmacy and phone number: [id number] - [place] [facility] [address] [phone] [facility] [phone] FX

## 2024-03-13 NOTE — TELEPHONE ENCOUNTER
Please let her know that her insurance company will not pay for the continuous glucose monitor unless she is on insulin.  Dr. Woodard

## 2024-03-14 DIAGNOSIS — E11.9 TYPE 2 DIABETES MELLITUS WITHOUT COMPLICATION, WITHOUT LONG-TERM CURRENT USE OF INSULIN: Primary | ICD-10-CM

## 2024-03-14 RX ORDER — BLOOD-GLUCOSE SENSOR
1 EACH MISCELLANEOUS
Qty: 2 EACH | Refills: 11 | Status: SHIPPED | OUTPATIENT
Start: 2024-03-14

## 2024-03-14 RX ORDER — BLOOD-GLUCOSE,RECEIVER,CONT
1 EACH MISCELLANEOUS
Qty: 1 EACH | Refills: 0 | Status: SHIPPED | OUTPATIENT
Start: 2024-03-14

## 2024-04-05 ENCOUNTER — TELEPHONE (OUTPATIENT)
Dept: FAMILY MEDICINE CLINIC | Age: 54
End: 2024-04-05
Payer: COMMERCIAL

## 2024-04-05 NOTE — TELEPHONE ENCOUNTER
----- Message from Liliya Escalera sent at 4/5/2024  8:49 AM EDT -----      ----- Message -----  From: SYSTEM  Sent: 4/5/2024   1:58 AM EDT  To: [facility] Jaiden Villavicencio [facility]

## 2024-04-05 NOTE — TELEPHONE ENCOUNTER
Spoke with patient states she will compete overdue [facility] next week. Jesus

## 2024-04-22 ENCOUNTER — OFFICE VISIT (OUTPATIENT)
Dept: FAMILY MEDICINE CLINIC | Age: 54
End: 2024-04-22
Payer: COMMERCIAL

## 2024-04-22 ENCOUNTER — LAB (OUTPATIENT)
Dept: LAB | Facility: HOSPITAL | Age: 54
End: 2024-04-22
Payer: COMMERCIAL

## 2024-04-22 VITALS
HEART RATE: 97 BPM | SYSTOLIC BLOOD PRESSURE: 151 MMHG | WEIGHT: 173 LBS | BODY MASS INDEX: 31.83 KG/M2 | DIASTOLIC BLOOD PRESSURE: 86 MMHG | HEIGHT: 62 IN

## 2024-04-22 DIAGNOSIS — S20.469A TICK BITE OF BACK WALL OF THORAX, UNSPECIFIED LOCATION, INITIAL ENCOUNTER: Primary | ICD-10-CM

## 2024-04-22 DIAGNOSIS — E87.5 SERUM POTASSIUM ELEVATED: ICD-10-CM
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
## 2024-04-22 LAB
ANION GAP SERPL CALCULATED.3IONS-SCNC: 11 MMOL/L (ref 5–15)
BUN SERPL-MCNC: 13 MG/DL (ref 6–20)
BUN/CREAT SERPL: 14.1 (ref 7–25)
CALCIUM SPEC-SCNC: 9.5 MG/DL (ref 8.6–10.5)
CHLORIDE SERPL-SCNC: 101 MMOL/L (ref 98–107)
CO2 SERPL-SCNC: 29 MMOL/L (ref 22–29)
CREAT SERPL-MCNC: 0.92 MG/DL (ref 0.57–1)
EGFRCR SERPLBLD CKD-EPI 2021: 74.6 ML/MIN/1.73
GLUCOSE SERPL-MCNC: 179 MG/DL (ref 65–99)
POTASSIUM SERPL-SCNC: 4.6 MMOL/L (ref 3.5–5.2)
SODIUM SERPL-SCNC: 141 MMOL/L (ref 136–145)

## 2024-04-22 PROCEDURE — 36415 COLL VENOUS BLD VENIPUNCTURE: CPT

## 2024-04-22 PROCEDURE — 99213 OFFICE O/P EST LOW 20 MIN: CPT | Performed by: PHYSICIAN ASSISTANT

## 2024-04-22 PROCEDURE — 80048 BASIC METABOLIC PNL TOTAL CA: CPT

## 2024-04-22 RX ORDER — TRIAMCINOLONE ACETONIDE 1 MG/G
OINTMENT TOPICAL 2 TIMES DAILY
Qty: 30 G | Refills: 0 | Status: SHIPPED | OUTPATIENT
Start: 2024-04-22

## 2024-04-22 NOTE — PROGRESS NOTES
"Subjective     CHIEF COMPLAINT    Chief Complaint   Patient presents with    Insect Bite     Pt states she removed a tick from her back about a week ago and it is very itchy.             History of Present Illness  This is a 54-year-old female presenting the clinic with complaint of tick bite to her back.  She states she removed it 1 week ago and thinks it was on for \"a day or 2.\"  It has been very itchy since then but is not painful.  She has not had any fever, chills, headaches, joint pain, rashes or any other acute concerns.  She has tried multiple over-the-counter medications with minimal improvement of her symptoms.            Review of Systems   Constitutional:  Negative for chills and fever.   Gastrointestinal:  Negative for nausea and vomiting.   Musculoskeletal:  Negative for arthralgias, joint swelling and myalgias.   Skin:  Positive for wound (Insect bite). Negative for rash.   Neurological:  Negative for headaches.            Past Medical History:   Diagnosis Date    Allergic 10 years ago    Allergic to morphine/ demerol    Anxiety     Sometimes    Arthritis     I had a bone density 10 years ago  and a xray afew month ago    Asthma June 2021    I get short of breath easy    Cholelithiasis Nov 1987    Removed on 2/3/88    Cyst of kidney, acquired     Dyshidrosis     Dysthymic disorder     Elevated white blood cell count     Essential (primary) hypertension     GERD (gastroesophageal reflux disease)     About 10 years ago    Heartburn     Hidradenitis     History of medical problems     My brother passed on at 51 canc    HL (hearing loss) Feb 2021    Ringing in ears mild    HLD (hyperlipidemia)     Low back pain     Nicotine dependence, unspecified, uncomplicated     Obesity 20 years ago    Cant loose bit dont gain    Other specified menopausal and perimenopausal disorders     Overweight     Personal history of other diseases of urinary system     Proteinuria     Renal insufficiency     I have a left " kidney only with a cyst in it    Solitary kidney     Type 2 diabetes mellitus without complication     Visual impairment Feb 2021    Noticed i some times get blurry vision            Allergies   Allergen Reactions    Demerol [Meperidine] Unknown - High Severity    Lisinopril Unknown - Low Severity    Morphine Unknown - High Severity            Current Outpatient Medications on File Prior to Visit   Medication Sig Dispense Refill    Accu-Chek Guide test strip 1 each by Other route 2 (Two) Times a Day. use to check glucose 3 times daily 200 each 2    Accu-Chek Softclix Lancets lancets USE 1 UNIT TO CHECK GLUCOSE ONCE DAILY      betamethasone valerate (VALISONE) 0.1 % ointment APPLY TOPICALLY TO THE APPROPRIATE AREA TWICE A DAY AS DIRECTED 45 g 0    Blood Glucose Monitoring Suppl (Accu-Chek Guide) w/Device kit USE UNIT TO CHECK GLUCOSE THREE TIMES DAILY      estradiol (ESTRACE) 0.1 MG/GM vaginal cream Insert 0.5 g into the vagina 2 (Two) Times a Week. 42.5 g 5    gentamicin (GARAMYCIN) 0.1 % ointment Apply 1 application  topically to the appropriate area as directed 3 (Three) Times a Day. 30 g 0    glipizide (GLUCOTROL XL) 5 MG ER tablet Take 1 tablet by mouth Daily. 90 tablet 1    metFORMIN ER (GLUCOPHAGE-XR) 500 MG 24 hr tablet Take 1 tablet by mouth Daily With Breakfast. 90 tablet 1    omeprazole (priLOSEC) 40 MG capsule Take 1 capsule by mouth Daily. 90 capsule 1    ondansetron ODT (ZOFRAN-ODT) 4 MG disintegrating tablet Place 1 tablet on the tongue Every 8 (Eight) Hours As Needed for Nausea or Vomiting. 20 tablet 0    rosuvastatin (CRESTOR) 10 MG tablet Take 1 tablet by mouth Daily. 90 tablet 1    Semaglutide, 2 MG/DOSE, (Ozempic, 2 MG/DOSE,) 8 MG/3ML solution pen-injector Inject 2 mg under the skin into the appropriate area as directed 1 (One) Time Per Week. 3 mL 2    venlafaxine XR (EFFEXOR-XR) 75 MG 24 hr capsule Take 1 capsule by mouth Daily for 180 days. 90 capsule 1    vitamin D (ERGOCALCIFEROL) 1.25 MG  "(96784 UT) capsule capsule Take 1 capsule by mouth 1 (One) Time Per Week. 13 capsule 0    [DISCONTINUED] Acne Medication 5 5 % gel APPLY 1 APPLICATION TOPICALLY TO THE APPROPRIATE AREA AS DIRECTED DAILY. 42.5 g 2    [DISCONTINUED] Continuous Blood Gluc  (FreeStyle Chantal 3 [place]) device Use 1 each Every 30 (Thirty) Days. 1 each 0    [DISCONTINUED] Continuous Blood Gluc Sensor (FreeStyle Chantal 3 Sensor) misc Use 1 each Every 14 (Fourteen) Days. 2 each 11    [DISCONTINUED] triamcinolone (KENALOG) 0.1 % ointment APPLY TO AFFECTED AREA TWICE A DAY INTO RIGHT EAR       No current facility-administered medications on file prior to visit.            /86 (BP Location: Right arm, Patient Position: Sitting)   Pulse 97   Ht 157.5 cm (62.01\")   Wt 78.5 kg (173 lb)   BMI 31.63 kg/m²          Objective     Physical Exam  Vitals and nursing note reviewed.   Constitutional:       General: She is not in acute distress.     Appearance: Normal appearance.   Pulmonary:      Effort: Pulmonary effort is normal. No respiratory distress.   Skin:     General: Skin is warm and dry.      Findings: Erythema present.   Neurological:      Mental Status: She is alert and oriented to person, place, and time.   Psychiatric:         Mood and Affect: Mood normal.                 Assessment & Plan  Tick bite of back wall of thorax, unspecified location, initial encounter  Not concerning for infection.  No erythema migrans.  Redness surrounding bite is blanchable and more consistent with bruising from scratching than true erythema.  Will treat with Kenalog ointment as below.  Caitlin was advised to contact the office for any fevers/chills, nausea or vomiting, rashes, headaches, joint pain or other acute concerns.     New Medications Ordered This Visit   Medications    triamcinolone (KENALOG) 0.1 % ointment     Sig: Apply  topically to the appropriate area as directed 2 (Two) Times a Day.     Dispense:  30 g     Refill:  0          "       FOR FULL DISCHARGE INSTRUCTIONS/COMMENTS/HANDOUTS please see the   AVS

## 2024-04-23 DIAGNOSIS — E11.9 TYPE 2 DIABETES MELLITUS WITHOUT COMPLICATION, WITHOUT LONG-TERM CURRENT USE OF INSULIN: Primary | ICD-10-CM

## 2024-05-20 DIAGNOSIS — E11.9 TYPE 2 DIABETES MELLITUS WITHOUT COMPLICATION, WITHOUT LONG-TERM CURRENT USE OF INSULIN: ICD-10-CM

## 2024-05-20 DIAGNOSIS — E55.9 VITAMIN D DEFICIENCY: ICD-10-CM

## 2024-05-21 RX ORDER — ERGOCALCIFEROL 1.25 MG/1
50000 CAPSULE ORAL
Qty: 13 CAPSULE | Refills: 0 | Status: SHIPPED | OUTPATIENT
Start: 2024-05-21

## 2024-05-21 RX ORDER — SEMAGLUTIDE 2.68 MG/ML
2 INJECTION, SOLUTION SUBCUTANEOUS WEEKLY
Qty: 3 ML | Refills: 2 | Status: SHIPPED | OUTPATIENT
Start: 2024-05-21

## 2024-05-29 ENCOUNTER — TELEPHONE (OUTPATIENT)
Dept: FAMILY MEDICINE CLINIC | Age: 54
End: 2024-05-29
Payer: COMMERCIAL

## 2024-05-31 ENCOUNTER — LAB (OUTPATIENT)
Dept: LAB | Facility: HOSPITAL | Age: 54
End: 2024-05-31
Payer: COMMERCIAL

## 2024-05-31 DIAGNOSIS — E11.9 TYPE 2 DIABETES MELLITUS WITHOUT COMPLICATION, WITHOUT LONG-TERM CURRENT USE OF INSULIN: ICD-10-CM

## 2024-05-31 LAB — HBA1C MFR BLD: 7.5 % (ref 4.8–5.6)

## 2024-05-31 PROCEDURE — 36415 COLL VENOUS BLD VENIPUNCTURE: CPT

## 2024-05-31 PROCEDURE — 83036 HEMOGLOBIN GLYCOSYLATED A1C: CPT

## 2024-06-09 DIAGNOSIS — S20.469A TICK BITE OF BACK WALL OF THORAX, UNSPECIFIED LOCATION, INITIAL ENCOUNTER: ICD-10-CM

## 2024-06-09 DIAGNOSIS — R12 HEARTBURN: ICD-10-CM

## 2024-06-09 DIAGNOSIS — E78.2 MIXED HYPERLIPIDEMIA: ICD-10-CM
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
## 2024-06-10 RX ORDER — ROSUVASTATIN CALCIUM 10 MG/1
10 TABLET, COATED ORAL DAILY
Qty: 90 TABLET | Refills: 0 | Status: SHIPPED | OUTPATIENT
Start: 2024-06-10

## 2024-06-10 RX ORDER — OMEPRAZOLE 40 MG/1
40 CAPSULE, DELAYED RELEASE ORAL DAILY
Qty: 90 CAPSULE | Refills: 1 | Status: SHIPPED | OUTPATIENT
Start: 2024-06-10

## 2024-06-10 RX ORDER — TRIAMCINOLONE ACETONIDE 1 MG/G
OINTMENT TOPICAL
Qty: 80 G | Refills: 1 | Status: SHIPPED | OUTPATIENT
Start: 2024-06-10

## 2024-06-30 DIAGNOSIS — E11.9 TYPE 2 DIABETES MELLITUS WITHOUT COMPLICATION, WITHOUT LONG-TERM CURRENT USE OF INSULIN: ICD-10-CM

## 2024-06-30 DIAGNOSIS — L40.9 PSORIASIS: ICD-10-CM

## 2024-06-30 DIAGNOSIS — R11.0 NAUSEA: ICD-10-CM

## 2024-07-01 RX ORDER — ONDANSETRON 4 MG/1
TABLET, ORALLY DISINTEGRATING ORAL
Qty: 10 TABLET | Refills: 0 | Status: SHIPPED | OUTPATIENT
Start: 2024-07-01

## 2024-07-01 RX ORDER — GENTAMICIN SULFATE 1 MG/G
1 OINTMENT TOPICAL 3 TIMES DAILY
Qty: 30 G | Refills: 0 | Status: SHIPPED | OUTPATIENT
Start: 2024-07-01

## 2024-07-01 RX ORDER — BLOOD SUGAR DIAGNOSTIC
STRIP MISCELLANEOUS
Qty: 100 EACH | Refills: 5 | Status: SHIPPED | OUTPATIENT
Start: 2024-07-01

## 2024-07-09 ENCOUNTER — OFFICE VISIT (OUTPATIENT)
Dept: FAMILY MEDICINE CLINIC | Age: 54
End: 2024-07-09
Payer: COMMERCIAL

## 2024-07-09 VITALS
BODY MASS INDEX: 31.28 KG/M2 | WEIGHT: 170 LBS | OXYGEN SATURATION: 99 % | SYSTOLIC BLOOD PRESSURE: 129 MMHG | DIASTOLIC BLOOD PRESSURE: 71 MMHG | HEIGHT: 62 IN | HEART RATE: 81 BPM

## 2024-07-09 DIAGNOSIS — E55.9 VITAMIN D DEFICIENCY: ICD-10-CM

## 2024-07-09 DIAGNOSIS — E11.9 TYPE 2 DIABETES MELLITUS WITHOUT COMPLICATION, WITHOUT LONG-TERM CURRENT USE OF INSULIN: ICD-10-CM

## 2024-07-09 DIAGNOSIS — N95.1 VAGINAL DRYNESS, MENOPAUSAL: ICD-10-CM

## 2024-07-09 DIAGNOSIS — E78.2 MIXED HYPERLIPIDEMIA: ICD-10-CM

## 2024-07-09 DIAGNOSIS — L40.9 PSORIASIS: ICD-10-CM

## 2024-07-09 DIAGNOSIS — Z23 ENCOUNTER FOR IMMUNIZATION: ICD-10-CM

## 2024-07-09 DIAGNOSIS — Z12.11 ENCOUNTER FOR SCREENING COLONOSCOPY: ICD-10-CM

## 2024-07-09 DIAGNOSIS — Z00.00 ANNUAL PHYSICAL EXAM: Primary | ICD-10-CM

## 2024-07-09 DIAGNOSIS — R53.83 OTHER FATIGUE: ICD-10-CM

## 2024-07-09 DIAGNOSIS — Z72.0 TOBACCO ABUSE: ICD-10-CM

## 2024-07-09 DIAGNOSIS — Z12.31 SCREENING MAMMOGRAM FOR BREAST CANCER: ICD-10-CM

## 2024-07-09 DIAGNOSIS — R12 HEARTBURN: ICD-10-CM

## 2024-07-09 DIAGNOSIS — L73.2 HYDRADENITIS: ICD-10-CM

## 2024-07-09 DIAGNOSIS — R68.82 LIBIDO, DECREASED: ICD-10-CM

## 2024-07-09 DIAGNOSIS — E66.09 CLASS 1 OBESITY DUE TO EXCESS CALORIES WITH SERIOUS COMORBIDITY AND BODY MASS INDEX (BMI) OF 31.0 TO 31.9 IN ADULT: ICD-10-CM

## 2024-07-09 DIAGNOSIS — Z78.0 MENOPAUSE: ICD-10-CM

## 2024-07-09 DIAGNOSIS — Z01.419 WELL WOMAN EXAM: ICD-10-CM

## 2024-07-09 LAB
DEVELOPER EXPIRATION DATE: NORMAL
DEVELOPER LOT NUMBER: NORMAL
EXPIRATION DATE: NORMAL
FECAL OCCULT BLOOD SCREEN, POC: NEGATIVE
Lab: 232
NEGATIVE CONTROL: NEGATIVE
POSITIVE CONTROL: POSITIVE

## 2024-07-09 PROCEDURE — 90471 IMMUNIZATION ADMIN: CPT | Performed by: FAMILY MEDICINE

## 2024-07-09 PROCEDURE — 99214 OFFICE O/P EST MOD 30 MIN: CPT | Performed by: FAMILY MEDICINE

## 2024-07-09 PROCEDURE — 82270 OCCULT BLOOD FECES: CPT | Performed by: FAMILY MEDICINE

## 2024-07-09 PROCEDURE — 99396 PREV VISIT EST AGE 40-64: CPT | Performed by: FAMILY MEDICINE

## 2024-07-09 RX ORDER — ESTRADIOL 0.1 MG/G
2 CREAM VAGINAL DAILY
Qty: 42.5 G | Refills: 2 | Status: SHIPPED | OUTPATIENT
Start: 2024-07-09

## 2024-07-09 NOTE — PROGRESS NOTES
Caitlin Escalera presents to [facility].    Chief Complaint: WWE    Subjective     History of Present Illness:  HPI  Patient is in today for yearly physical and WWE.  Last Menses: s/p hysterectomy  M0  Last mammogram:   Last DEXA: never  Last colonoscopy: never  Urinary symptoms: none  Sexual concerns: none  EtOH use: NONE  Tobacco use: current 1/2 ppd  She wears a seatbelt in the car  Drug use: NONE  Last eye exam: 8 months ago, has a lazy eye  Dental exams:recc every 6 months, SHE GOES ONCE A YEAR  IMMUNIZATIONS:  ,PREVNAR 20, COVID BOOSTER, FLU in FALL, SHINGRIX   DIET: HEALTHY  EXERCISE: WALKS AND SWIMS MOST DAYS  HEARING: NO ISSUES    H/O dyshydrandenitis, well controlled with topical gentamicin, she needs refills    She presents with intermittent psoriasis and hidradenitis, she has overall been stable for a while now she is currently on topical steroid and gentamicin cream as needed.  She tolerates meds well and uses sparingly.  No rash present on hands today.  She does have a couple sores in the axilla area    She presents for f/u for her chronic essential (primary) hypertension, she is on healthy diet and losing weight, current nonpharmacologic treatment includes low sodium diet and daily aerobic exercise.  Her current cardiac medication regimen includes an ACE inhibitor.  Compliance with treatment has been good; she takes her medication as directed.  She is tolerating the medication well without side effects.  Home BP checks are good.     She also has type 2 diabetes mellitus without complications. She reports home blood glucose readings have been excellent, with average fasting glucoses running <120 mg/dL.   On low dose metformin 1- 500 mg daily and no diarrhea (higher doses cause diarrhea), stopped glipizide due to brain fog, also onozempic 1 mg weekly , doing well, has lost 36#s total.   Recommend yearly eye exams, she has no no diabetic retinopathy, lazy R eye, h/o   L eye retinal bleed.  Denies depression/anxiety on venlafexine and she is happy and thriving, her anxiety is stable and well-controlled.  Not sleeping well at night, feels tired during the day, ok to check for sleep apnea.            She presents with mixed hyperlipidemia, stable on atorvastatin 40 mg daily and she tolerates med well.  Recent labs reviewed and WNL.  Will check labs every 6 months and adjust medication pending results     Chronic GERD and she is stable on omeprazole 40 mg nightly and tolerates medication well, she is to avoid spicy and acidic foods     She is trying to quit smoking and has had contact dermatitis with the nicotine patches.  Recc she try nicorette gum.  Recommend 1 800 quit NOW for counseling          Review of Systems:  Review of Systems   Constitutional:  Positive for fatigue. Negative for chills and fever.   HENT:  Negative for congestion, ear pain, rhinorrhea, sinus pressure and sore throat.    Eyes:  Negative for blurred vision, double vision and visual disturbance.   Respiratory:  Negative for cough, shortness of breath and wheezing.    Cardiovascular:  Negative for chest pain, palpitations and leg swelling.   Gastrointestinal:  Positive for GERD. Negative for abdominal pain, blood in stool, constipation, diarrhea, nausea and vomiting.   Endocrine: Negative for polyuria.   Genitourinary:  Negative for dysuria and hematuria.   Musculoskeletal:  Negative for arthralgias and myalgias.   Skin:  Negative for rash.   Neurological:  Negative for dizziness, weakness and headache.   Psychiatric/Behavioral:  Positive for sleep disturbance. Negative for suicidal ideas and depressed mood. The patient is not nervous/anxious.         Objective     Medications:  Current Outpatient Medications on File Prior to Visit   Medication Sig    Accu-Chek Guide test strip USE TO CHECK GLUCOSE 3 TIMES DAILY    Accu-Chek Softclix Lancets lancets USE 1 UNIT TO CHECK GLUCOSE ONCE DAILY    betamethasone  "valerate (VALISONE) 0.1 % ointment APPLY TOPICALLY TO THE APPROPRIATE AREA TWICE A DAY AS DIRECTED    Blood Glucose Monitoring Suppl (Accu-Chek Guide) w/Device kit USE UNIT TO CHECK GLUCOSE THREE TIMES DAILY    gentamicin (GARAMYCIN) 0.1 % ointment APPLY 1 APPLICATION TOPICALLY TO THE APPROPRIATE AREA AS DIRECTED 3 (THREE) TIMES A DAY.    metFORMIN ER (GLUCOPHAGE-XR) 500 MG 24 hr tablet Take 1 tablet by mouth Daily With Breakfast.    omeprazole (priLOSEC) 40 MG capsule TAKE 1 CAPSULE BY MOUTH EVERY DAY    ondansetron ODT (ZOFRAN-ODT) 4 MG disintegrating tablet PLACE 1 TABLET ON THE TONGUE EVERY 8 HOURS AS NEEDED FOR NAUSEA AND VOMITING    rosuvastatin (CRESTOR) 10 MG tablet TAKE 1 TABLET BY MOUTH EVERY DAY    Semaglutide, 2 MG/DOSE, (Ozempic, 2 MG/DOSE,) 8 MG/3ML solution pen-injector INJECT 2 MG UNDER THE SKIN INTO THE APPROPRIATE AREA AS DIRECTED 1 (ONE) TIME PER WEEK.    triamcinolone (KENALOG) 0.1 % ointment APPLY TO AFFECTED AREA TWICE A DAY INTO RIGHT EAR    venlafaxine XR (EFFEXOR-XR) 75 MG 24 hr capsule Take 1 capsule by mouth Daily for 180 days.    vitamin D (ERGOCALCIFEROL) 1.25 MG (29564 UT) capsule capsule TAKE 1 CAPSULE BY MOUTH 1 TIME PER WEEK.     No current facility-administered medications on file prior to visit.       Vital Signs:   /71   Pulse 81   Ht 157.5 cm (62.01\")   Wt 77.1 kg (170 lb)   SpO2 99%   BMI 31.08 kg/m²       Physical Exam:  Physical Exam  Constitutional:       General: She is not in acute distress.     Appearance: She is normal weight. She is not ill-appearing.   HENT:      Head: Normocephalic.      Right Ear: Tympanic membrane normal. There is no impacted cerumen.      Left Ear: Tympanic membrane normal. There is no impacted cerumen.      Mouth/Throat:      Mouth: Mucous membranes are moist.      Pharynx: Oropharynx is clear.   Eyes:      Extraocular Movements: Extraocular movements intact.      Pupils: Pupils are equal, round, and reactive to light.   Neck:      " Vascular: No carotid bruit.   Cardiovascular:      Rate and Rhythm: Normal rate and regular rhythm.      Pulses: Normal pulses.      Heart sounds: No murmur heard.  Pulmonary:      Effort: Pulmonary effort is normal.      Breath sounds: No wheezing, rhonchi or rales.   Chest:   Breasts:     Oscar Score is 5.      Right: Normal.      Left: Normal.   Abdominal:      General: Bowel sounds are normal.      Palpations: Abdomen is soft.      Tenderness: There is no abdominal tenderness.      Hernia: There is no hernia in the left inguinal area or right inguinal area.   Genitourinary:     General: Normal vulva.      Exam position: Knee-chest position.      Labia:         Right: No rash, tenderness, lesion or injury.         Left: No rash, tenderness, lesion or injury.       Urethra: No prolapse, urethral pain, urethral swelling or urethral lesion.      Vagina: Normal.      Uterus: Absent.       Adnexa: Right adnexa normal and left adnexa normal.      Rectum: Normal.   Musculoskeletal:         General: No swelling. Normal range of motion.      Cervical back: No rigidity or tenderness.   Lymphadenopathy:      Cervical: No cervical adenopathy.      Upper Body:      Right upper body: No axillary adenopathy.      Left upper body: No axillary adenopathy.      Lower Body: No right inguinal adenopathy. No left inguinal adenopathy.   Skin:     General: Skin is warm and dry.   Neurological:      Mental Status: She is alert.      Gait: Gait normal.   Psychiatric:         Mood and Affect: Mood normal.         Behavior: Behavior normal.         Judgment: Judgment normal.         Result Review   The following data was reviewed by Rosalina Woodard MD on 07/05/2023.  Lab Results   Component Value Date    WBC 9.23 02/23/2024    HGB 16.4 (H) 02/23/2024    HCT 48.0 (H) 02/23/2024    MCV 93.4 02/23/2024     02/23/2024     Lab Results   Component Value Date    GLUCOSE 179 (H) 04/22/2024    BUN 13 04/22/2024    CREATININE 0.92  "04/22/2024    EGFR 74.6 04/22/2024    BCR 14.1 04/22/2024    K 4.6 04/22/2024    CO2 29.0 04/22/2024    CALCIUM 9.5 04/22/2024    ALBUMIN 4.1 02/23/2024    BILITOT 0.5 02/23/2024    AST 20 02/23/2024    ALT 22 02/23/2024     Lab Results   Component Value Date    CHOL 194 02/23/2024    CHLPL 197 01/11/2021    TRIG 191 (H) 02/23/2024    HDL 45 02/23/2024     (H) 02/23/2024     Lab Results   Component Value Date    TSH 1.890 12/14/2021     Lab Results   Component Value Date    HGBA1C 7.50 (H) 05/31/2024     No results found for: \"PSA\"               Assessment and Plan:       Diagnoses and all orders for this visit:    1. Annual physical exam (Primary)    2. Menopause  Comments:  DEXA referral placed  Orders:  -     DEXA Bone Density Axial; Future    3. Screening mammogram for breast cancer  Comments:  Screening mammogram referral placed, breast exam WNL  Orders:  -     Mammo Screening Digital Tomosynthesis Bilateral With CAD; Future    4. Encounter for screening colonoscopy  Comments:  Screening colonoscopy referral placed  Orders:  -     Ambulatory Referral For Screening Colonoscopy  -     POC Occult Blood Stool    5. Well woman exam  Comments:  Pelvic exam WNL.  No Pap needed    6. Encounter for immunization  Comments:  Recommend Shingrix vaccination and COVID booster, recommend flu vaccine in the fall  Orders:  -     Pneumococcal Conjugate Vaccine 20-Valent (PCV20)    7. Tobacco abuse  Comments:  Counseled on cessation and she defers.  She is to call [employer] now for assistance in F/u if she wants to quit.  CT low-dose lungs ordered  Orders:  -      CT Chest Low Dose Cancer Screening WO; Future    8. Other fatigue  Comments:  Will check labs  Orders:  -     Ambulatory Referral to Sleep Medicine    9. Class 1 obesity due to excess calories with serious comorbidity and body mass index (BMI) of 31.0 to 31.9 in adult  Comments:  Recommend referral to dietitian to help her with healthy diet and she defers.  " Recommend daily exercise 30 minutes aerobic activity.  She has lost 36 pounds  Orders:  -     Ambulatory Referral to Sleep Medicine    10. Psoriasis  Comments:  Stable with topical gentamicin and steroid cream that she uses as needed and sparingly    11. Type 2 diabetes mellitus without complication, without long-term current use of insulin  Comments:  Well-controlled with Ozempic and metformin.  She did not tolerate glipizide.  Foot exam WNL.  Rec yearly eye exams    12. Mixed hyperlipidemia  Comments:  Stable on Crestor and tolerates meds well.  No changes in current meds or treatment plan    13. Vitamin D deficiency  Comments:  Continue vitamin D supplementation.  Will continue to check labs and adjust dosing pending results    14. Heartburn  Comments:  Stable on omeprazole.  She tolerates med well.  She is to avoid spicy and acidic food    15. Hydradenitis  Comments:  will refill her gentamycin, she is worse since she has been out of medication    16. Libido, decreased  Comments:  She may need HRT but should not get HRT with her tobacco current use.  Will start her on topical estrogen which is low risk    17. Vaginal dryness, menopausal  Comments:  She may need HRT but should not get HRT with her tobacco current use. Will start her on topical estrogen which is low risk  Orders:  -     estradiol (ESTRACE VAGINAL) 0.1 MG/GM vaginal cream; Insert 2 g into the vagina Daily.  Dispense: 42.5 g; Refill: 2    18. Psoriasis  Comments:  We will change her topical steroid cream to betamethasone and topical antibiotic to gentamicin            Follow Up   Return in about 6 months (around 1/9/2025) for Recheck.           Medical History:  Past Medical History:    ADHD (attention deficit hyperactivity disorder)    Problems focusing    Allergic    Allergic to morphine/ demerol    Anxiety    Sometimes    Arthritis    I had a bone density 10 years ago  and a xray afew month ago    Asthma    I get short of breath easy     Cholelithiasis    Removed on 2/3/88    Cyst of kidney, acquired    Dyshidrosis    Dysthymic disorder    Elevated white blood cell count    Essential (primary) hypertension    GERD (gastroesophageal reflux disease)    About 10 years ago    Heartburn    Hidradenitis    History of medical problems    My brother passed on at 51 canc    HL (hearing loss)    Ringing in ears mild    HLD (hyperlipidemia)    Low back pain    Nicotine dependence, unspecified, uncomplicated    Obesity    Cant loose bit dont gain    Other specified menopausal and perimenopausal disorders    Overweight    Personal history of other diseases of urinary system    Proteinuria    Renal insufficiency    I have a left kidney only with a cyst in it    Solitary kidney    Type 2 diabetes mellitus without complication    Visual impairment    Noticed i some times get blurry vision     Past Surgical History:    CHOLECYSTECTOMY    HYSTERECTOMY    In 30s    SUBTOTAL HYSTERECTOMY    18 years ago i still have ovaries    TUBAL ABDOMINAL LIGATION      Family History   Problem Relation Age of Onset    Hypothyroidism Mother     Anxiety disorder Mother         Hers is really bad    Depression Mother     Diabetes Mother     Hearing loss Mother     Hyperlipidemia Mother     Miscarriages / Stillbirths Mother         Still birth at almost 9 month's gestation    Thyroid disease Mother     Vision loss Mother     Diabetes type II Father     Heart failure Father     Alcohol abuse Father         He became bipolar when he drank    Arthritis Father     Asthma Father     COPD Father     Depression Father     Diabetes Father     Heart disease Father         Congestive heart failure/ pacemaker    Hyperlipidemia Father     Mental illness Father         Bipolar/paranoid scitsofrania    Vision loss Father     Cancer Brother         Lymphoma/ bone    Early death Brother         Found cancer past 7 to 8 weeks later    Hearing loss Brother     Vision loss Brother     Cancer Maternal  Grandfather         Passed away of Throat cancer    Cancer Maternal Grandmother         Pancreatic cancer    COPD Paternal Grandfather          from it    Diabetes Paternal Grandmother     Stroke Paternal Grandmother         Its what made her pass away    Anxiety disorder Sister         Hers is bad    Arthritis Sister     Asthma Sister     Depression Sister     Diabetes Sister     Learning disabilities Sister         Adhd    Vision loss Sister     Cancer Brother         Kidney cancer / healed no more cancer    Diabetes Brother     Hearing loss Brother     Vision loss Brother      Social History     Tobacco Use    Smoking status: Every Day     Current packs/day: 0.50     Average packs/day: 0.7 packs/day for 45.0 years (30.0 ttl pk-yrs)     Types: Cigarettes    Smokeless tobacco: Never    Tobacco comments:     Tried everything to quit   Substance Use Topics    Alcohol use: Not Currently     Comment: I probably have a taste once or twice a year       Health Maintenance Due   Topic Date Due    LUNG CANCER SCREENING  Never done    MAMMOGRAM  2022    COVID-19 Vaccine ( season) Never done    DIABETIC EYE EXAM  2024        Immunization History   Administered Date(s) Administered    Fluzone (or Fluarix & Flulaval for VFC) >6mos 2017    Fluzone Quad >6mos (Multi-dose) 2017    Hepatitis B Adult/Adolescent IM 2011, 2011, 10/22/2012    MMR 2011    Pneumococcal Conjugate 20-Valent (PCV20) 07/10/2024    Td (TDVAX) 1998, 2011    Tdap 2022       Allergies   Allergen Reactions    Morphine Anaphylaxis    Demerol [Meperidine] Dizziness and Myalgia    Lisinopril Dizziness and Myalgia

## 2024-07-10 PROCEDURE — 90677 PCV20 VACCINE IM: CPT | Performed by: FAMILY MEDICINE

## 2024-07-18 DIAGNOSIS — E11.9 TYPE 2 DIABETES MELLITUS WITHOUT COMPLICATION, WITHOUT LONG-TERM CURRENT USE OF INSULIN: ICD-10-CM

## 2024-07-18 RX ORDER — METFORMIN HYDROCHLORIDE 500 MG/1
500 TABLET, EXTENDED RELEASE ORAL 2 TIMES DAILY WITH MEALS
Qty: 180 TABLET | Refills: 1 | Status: SHIPPED | OUTPATIENT
Start: 2024-07-18

## 2024-07-30 ENCOUNTER — APPOINTMENT (OUTPATIENT)
Dept: CT IMAGING | Facility: HOSPITAL | Age: 54
End: 2024-07-30
Payer: COMMERCIAL

## 2024-07-30 ENCOUNTER — HOSPITAL ENCOUNTER (OUTPATIENT)
Dept: BONE DENSITY | Facility: HOSPITAL | Age: 54
Discharge: HOME | End: 2024-07-30
Admitting: FAMILY MEDICINE
Payer: COMMERCIAL

## 2024-07-30 DIAGNOSIS — Z78.0 MENOPAUSE: ICD-10-CM

## 2024-07-30 PROCEDURE — 77080 DXA BONE DENSITY AXIAL: CPT

## 2024-08-06 ENCOUNTER — TELEPHONE (OUTPATIENT)
Dept: SLEEP MEDICINE | Facility: HOSPITAL | Age: 54
End: 2024-08-06
Payer: COMMERCIAL

## 2024-08-06 NOTE — TELEPHONE ENCOUNTER
----- Message from [facility] sent at 8/4/2024  8:49 AM EDT -----  Regarding: Appointment canceled  Contact: [phone]  Appointment canceled for Caitlin Escalera ([id number])  Visit Type: NEW PATIENT - SLEEP  Date        Time      Length    Provider                  Department  8/6/2024     1:45 PM  30 mins.  Daphne Eric            [facility]    Reason for Cancellation: Other    Patient Comments: Let's schedule another appt my car is in the shop.

## 2024-08-06 NOTE — TELEPHONE ENCOUNTER
----- Message from [facility] sent at 8/4/2024  8:49 AM EDT -----  Regarding: Appointment canceled  Contact: [phone]  Appointment canceled for Caitlin Escalera ([id number])  Visit Type: NEW PATIENT - SLEEP  Date        Time      Length    Provider                  Department  8/6/2024     1:45 PM  30 mins.  Daphne Eric            [facility]    Reason for Cancellation: Other    Patient Comments: Let's schedule another appt my car is in the shop.

## 2024-08-07 ENCOUNTER — HOSPITAL ENCOUNTER (OUTPATIENT)
Dept: MAMMOGRAPHY | Facility: HOSPITAL | Age: 54
Discharge: HOME | End: 2024-08-07
Admitting: FAMILY MEDICINE
Payer: COMMERCIAL

## 2024-08-07 DIAGNOSIS — Z12.31 SCREENING MAMMOGRAM FOR BREAST CANCER: ICD-10-CM

## 2024-08-07 PROCEDURE — 77067 SCR MAMMO BI INCL CAD: CPT

## 2024-08-07 PROCEDURE — 77063 BREAST TOMOSYNTHESIS BI: CPT

## 2024-08-12 DIAGNOSIS — E11.9 TYPE 2 DIABETES MELLITUS WITHOUT COMPLICATION, WITHOUT LONG-TERM CURRENT USE OF INSULIN: ICD-10-CM

## 2024-08-12 RX ORDER — SEMAGLUTIDE 2.68 MG/ML
2 INJECTION, SOLUTION SUBCUTANEOUS WEEKLY
Qty: 9 ML | Refills: 0 | Status: SHIPPED | OUTPATIENT
Start: 2024-08-12

## 2024-08-19 ENCOUNTER — TELEPHONE (OUTPATIENT)
Dept: FAMILY MEDICINE CLINIC | Age: 54
End: 2024-08-19
Payer: COMMERCIAL

## 2024-08-20 DIAGNOSIS — E55.9 VITAMIN D DEFICIENCY: ICD-10-CM

## 2024-08-21 RX ORDER — ERGOCALCIFEROL 1.25 MG/1
50000 CAPSULE ORAL
Qty: 13 CAPSULE | Refills: 0 | Status: SHIPPED | OUTPATIENT
Start: 2024-08-21

## 2024-09-11 DIAGNOSIS — E11.9 TYPE 2 DIABETES MELLITUS WITHOUT COMPLICATION, WITHOUT LONG-TERM CURRENT USE OF INSULIN: ICD-10-CM

## 2024-09-11 RX ORDER — METFORMIN HCL 500 MG
500 TABLET, EXTENDED RELEASE 24 HR ORAL
Qty: 90 TABLET | Refills: 1 | OUTPATIENT
Start: 2024-09-11

## 2024-09-21 DIAGNOSIS — Z78.0 MENOPAUSE: ICD-10-CM

## 2024-09-22 NOTE — TELEPHONE ENCOUNTER
Caller: Caitlin Escalera GEMINI    Relationship: Self    Best call back number: [phone]    Requested Prescriptions:   Requested Prescriptions     Pending Prescriptions Disp Refills   • venlafaxine XR (EFFEXOR-XR) 75 MG 24 hr capsule 90 capsule 1     Sig: Take 1 capsule by mouth Daily for 180 days.   • minocycline (MINOCIN,DYNACIN) 100 MG capsule 180 capsule 1     Sig: Take 1 capsule by mouth 2 (Two) Times a Day.      Ozempic, 0.25 or 0.5 MG/DOSE, 2 MG/1.5ML solution pen-injector  Pharmacy where request should be sent: [facility]ARMACY #[id number] - [place], [place] - [address] [facility] [phone] [facility] [phone] FX     Additional details provided by patient:  [facility] IS NOW HER PRIMARY PHARMACY FOR ALL MEDICATIONS     Does the patient have less than a 3 day supply:  [] Yes  [x] No     Nathan Shankar   06/13/22 14:08 EDT            HPI   Chief Complaint   Patient presents with    Vaginal Itching         History provided by:  Patient   used: No      38-year-old female presents the ED for evaluation of vaginal itching, urinary frequency and yellow discharge with odor onset 2 days ago.  Patient believes that it is trichomonas and would like STD testing as she is having unprotected sexual intercourse.  She is not on birth control but denies any concern for pregnancy.  She denies any fevers, chills, abdominal pain, back pain, additional urinary symptoms or any additional symptoms or complaints this time.  Has taken no medications at home for symptoms except for using Monistat but was treated for STDs approximately 2 weeks ago even though she was negative at the time.  Denies any additional symptoms or complaints this time.    ROS is otherwise negative unless stated above.      Patient History   Past Medical History:   Diagnosis Date    Chlamydial infection, unspecified 2013    Disease due to chlamydiae    Complete or unspecified spontaneous  without complication ([facility]) 2013    Complete spontaneous     Dysuria 2014    Burning with urination    Encounter for initial prescription of injectable contraceptive 2020    Encounter for initial prescription of injectable contraceptive    Encounter for other general counseling and advice on contraception 2018    General counseling and advice on female contraception    Encounter for removal of intrauterine contraceptive device 2018    Encounter for IUD removal    Encounter for routine checking of intrauterine contraceptive device 10/23/2017    IUD check up    Encounter for routine postpartum follow-up ([facility]) 2017    Postpartum exam    Encounter for screening for infections with a predominantly sexual mode of transmission 2021    Routine screening for STI (sexually transmitted infection)    Encounter for screening for  infections with a predominantly sexual mode of transmission 2022    Routine screening for STI (sexually transmitted infection)    Encounter for surveillance of contraceptives, unspecified 10/08/2015    Contraceptive use    Encounter for surveillance of vaginal ring hormonal contraceptive device     Encounter for surveillance of vaginal ring hormonal contraceptive device    Excessive and frequent menstruation with irregular cycle 2018    Breakthrough bleeding with IUD     affected by slow intrauterine growth, unspecified ([facility]) 2017    Fetal growth retardation, 2,000-2,499 grams    Nicotine dependence, unspecified, uncomplicated 10/16/2018    Needs smoking cessation education    Other conditions influencing health status     Menstruation    Other conditions influencing health status     History of pregnancy    Other specified conditions associated with female genital organs and menstrual cycle 2022    Pain of ovary    Personal history of other diseases of the female genital tract 2014    History of vaginitis    Personal history of other diseases of the female genital tract 2015    History of amenorrhea    Personal history of other diseases of the female genital tract 2014    History of vaginal discharge    Personal history of other diseases of the female genital tract 2018    History of abnormal uterine bleeding    Personal history of other diseases of the female genital tract     History of vaginal discharge    Personal history of other diseases of the respiratory system 2014    History of pharyngitis    Personal history of other infectious and parasitic diseases 2022    History of trichomoniasis    Personal history of other infectious and parasitic diseases 2022    History of candidiasis of vagina    Personal history of other infectious and parasitic diseases     History of gonorrhea    Personal history of other specified conditions  2018    History of dysuria    Personal history of other venous thrombosis and embolism     History of deep venous thrombosis    Unspecified condition associated with female genital organs and menstrual cycle 2013    Genital symptoms, female    Urgency of urination 2015    Urgency of urination     Past Surgical History:   Procedure Laterality Date     SECTION, CLASSIC  2014     Section    DILATION AND CURETTAGE OF UTERUS  2014    Dilation And Curettage     Family History   Problem Relation Name Age of Onset    Asthma Mother      Hypertension Mother      Diabetes Father's Sister      Asthma Cousin      Asthma Other      Asthma Other      Hypertension Other      Asthma Other      Cancer Other      Cancer Other      Macular degeneration Neg Hx      Glaucoma Neg Hx       Social History     Tobacco Use    Smoking status: Every Day     Current packs/day: 0.50     Types: Cigarettes    Smokeless tobacco: Never   Substance Use Topics    Alcohol use: Yes     Comment: rarely    Drug use: Never       Physical Exam   ED Triage Vitals [24 1018]   Temperature Heart Rate Respirations BP   36.8 °C (98.2 °F) 89 18 108/74      Pulse Ox Temp Source Heart Rate Source Patient Position   98 % Oral -- --      BP Location FiO2 (%)     -- --       Physical Exam  VS: As documented in the triage note and EMR flowsheet from this visit were reviewed.    GEN: NAD, nontoxic, well-appearing, ambulates without difficulty  ABDOMEN: Abdomen soft, non-distended, no rebound, no guarding. Bowel sounds normal in all 4 quadrants. No tenderness to palpation.  No CVA tenderness.  No masses or organomegaly noted.  No evidence of peritonitis. Negative Iqbal's and McBurney's point tenderness.  No Rovsing's.  : deferred  MUSK: Spine appears normal, range of motion is not limited, no muscle or joint tenderness. Strength 5 out of 5 equal bilaterally throughout.  No step-offs, deformities or additional signs of  trauma noted.  No spinal/midline tenderness to palpation  SKIN: Skin normal color for race, warm, dry and intact. No evidence of trauma. No rash noted.  LYMPH: No adenopathy or splenomegaly. No cervical, supraclavicular or inguinal lymphadenopathy.    ED Course & MDM   Diagnoses as of 09/22/24 1139   UTI (urinary tract infection), bacterial   Vaginal itching                 No data recorded     Dawn Coma Scale Score: 15 (09/22/24 1019 : Radha Toussaint RN)                           Medical Decision Making    upon assessment patient was a healthy non-toxic appearing female in no apparent distress.  She was ambulating independently in the ED without difficulty. No abdominal tenderness to palpation. Assessment benign. pelvic exam was deferred at this time due to the patient being asymptomatic and I had no suspicion for PID/TOA at this time due to her assessment.  See physical exam section for my assessment. Physical exam concerning for but not limited to STD vs BV vs yeast infection vs UTI vs well exam.  Due to prior history and current physical exam, I ordered STD swabs, patient performed self swab after education, urinalysis and upreg.  Declined HIV and syphilis blood draws at this time.   Patient was educated that she would receive a call in 2-3 days if her chlamydia/gonorrhea results came back positive and needed treatment at that time. .    Workup was reviewed and unremarkable except for a UTI, did have budding yeast which I will give her a dose of Diflucan for in addition.    There is no indication for prophylactic STD treatment while in the emergency department, she was previously treated and was negative, I educated patient that I do not want her to become antibiotic resistant and she verbalized understanding.    Patient remained hemodynamically stable throughout ED visit.  Previous consult/ED visit notes were reviewed.    Findings were discussed with patient and patient agreeable for plan to discharge home  with primary care provider/OBGYN follow-up in 1-2 weeks  for further management.  She was educated to take over-the-counter ibuprofen and Tylenol p.o. at home as needed for any pain. prior prescriptions reviewed and patient prescribed a course of Macrobid p.o. for UTI treatment and an additional dose of Diflucan p.o. if needed for continued symptoms, educated that she receive a call with her urine culture result and if she needs a change in antibiotic at that time .She was educated to refrain from sex at this time until she receives her results.  She was educated on safe sex practices.  Return precautions discussed and patient acknowledged understanding.  All questions and concerns answered prior to discharge.      *Please note that portions of this note may have been completed with a voice recognition program.  Efforts were made to edit the dictations but occasionally, words are mis-transcribed.  Procedure  Procedures     Betsy Hill, SCOTT-CNP  09/22/24 1141       SCOTT Menchaca-PIOTR  09/22/24 1152       SCOTT Menchaca-CNP  09/22/24 1205

## 2024-09-23 RX ORDER — VENLAFAXINE HYDROCHLORIDE 75 MG/1
75 CAPSULE, EXTENDED RELEASE ORAL DAILY
Qty: 90 CAPSULE | Refills: 1 | Status: SHIPPED | OUTPATIENT
Start: 2024-09-23 | End: 2025-03-22

## 2024-10-07 DIAGNOSIS — S20.469A TICK BITE OF BACK WALL OF THORAX, UNSPECIFIED LOCATION, INITIAL ENCOUNTER: ICD-10-CM
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
## 2024-10-07 RX ORDER — TRIAMCINOLONE ACETONIDE 1 MG/G
OINTMENT TOPICAL SEE ADMIN INSTRUCTIONS
Qty: 30 G | OUTPATIENT
Start: 2024-10-07

## 2024-10-16 ENCOUNTER — TELEPHONE (OUTPATIENT)
Dept: FAMILY MEDICINE CLINIC | Age: 54
End: 2024-10-16
Payer: COMMERCIAL

## 2024-10-16 DIAGNOSIS — N89.8 VAGINAL ITCHING: Primary | ICD-10-CM

## 2024-10-16 RX ORDER — FLUCONAZOLE 150 MG/1
150 TABLET ORAL ONCE
Qty: 1 TABLET | Refills: 0 | Status: SHIPPED | OUTPATIENT
Start: 2024-10-16 | End: 2024-10-16

## 2024-10-16 NOTE — TELEPHONE ENCOUNTER
Caller: Caitlin Escalera    Relationship: Self    Best call back number: [phone]    What medication are you requesting: MEDICATION TO TREAT YEAST INFECTION CAUSED BY ANTIBIOTICS    What are your current symptoms: ITCHING AND BURNING     How long have you been experiencing symptoms: SINCE LAST WEEK     Have you had these symptoms before:    [x] Yes  [] No    Have you been treated for these symptoms before:   [x] Yes  [] No    If a prescription is needed, what is your preferred pharmacy and phone number: [facility] #[id number] - [address] & [place]  [phone] [facility] [phone]      Additional notes: PATIENT SENT A MESSAGE IN MY CHART LAST WEEK REGARDING THIS SHE HAS STOPPED THE ANTIBIOTICS FOR NOW AND SHE WOULD LIKE THIS PRESCRIPTION TO INCLUDE REFILLS SO SHE CAN GET IT IN THE FUTURE WHEN NEEDED SHE HAS USED SOME OTC MEDICATIONS WHICH HAVE HELPED BUT HAVE NOT CLEARED IT UP

## 2024-10-16 NOTE — TELEPHONE ENCOUNTER
Sorry I do not do refills for the Diflucan but I did send her in 1 for this acute episode.  Dr. Woodard

## 2024-11-06 DIAGNOSIS — E11.9 TYPE 2 DIABETES MELLITUS WITHOUT COMPLICATION, WITHOUT LONG-TERM CURRENT USE OF INSULIN: ICD-10-CM

## 2024-11-06 NOTE — TELEPHONE ENCOUNTER
Left a message to return call pt needs an appointment. Hub to relay schedule appointment with PCP.

## 2024-11-08 NOTE — TELEPHONE ENCOUNTER
Left a message to return call pt needs an appointment. Hub to relay schedule appointment with PCP.     no

## 2024-11-11 RX ORDER — SEMAGLUTIDE 2.68 MG/ML
2 INJECTION, SOLUTION SUBCUTANEOUS WEEKLY
Qty: 3 ML | Refills: 1 | Status: SHIPPED | OUTPATIENT
Start: 2024-11-11

## 2024-12-23 DIAGNOSIS — E55.9 VITAMIN D DEFICIENCY: ICD-10-CM

## 2024-12-26 RX ORDER — ERGOCALCIFEROL 1.25 MG/1
50000 CAPSULE, LIQUID FILLED ORAL
Qty: 13 CAPSULE | Refills: 0 | Status: SHIPPED | OUTPATIENT
Start: 2024-12-26

## 2025-01-02 DIAGNOSIS — E11.9 TYPE 2 DIABETES MELLITUS WITHOUT COMPLICATION, WITHOUT LONG-TERM CURRENT USE OF INSULIN: ICD-10-CM

## 2025-01-02 NOTE — TELEPHONE ENCOUNTER
Caller: Caitlin Escalera    Relationship: Self    Best call back number: [phone]     Requested Prescriptions:   Requested Prescriptions     Pending Prescriptions Disp Refills    Ozempic, 2 MG/DOSE, 8 MG/3ML solution pen-injector [Pharmacy Med Name: OZEMPIC 8 MG/3 ML (2 MG/DOSE)]  1     Sig: INJECT 2 MG UNDER THE SKIN INTO THE APPROPRIATE AREA AS DIRECTED 1 (ONE) TIME PER WEEK.        Pharmacy where request should be sent: [facility]/PHARMACY #[id number] - [place], [place] - [address] [facility] [phone] [facility] [phone] FX     Last office visit with prescribing clinician: 7/9/2024   Last telemedicine visit with prescribing clinician: Visit date not found   Next office visit with prescribing clinician: 3/3/2025     Additional details provided by patient: TWO SHOTS LET ON HAND, TWO WEEKS HAD TO R/S 01/06/2025 APPOINTMENT NEXT AVAILABLE 03/2025.     Does the patient have less than a 3 day supply:  [x] Yes  [] No      Nathan Yoo Rep   01/02/25 16:19 EST

## 2025-01-03 RX ORDER — SEMAGLUTIDE 2.68 MG/ML
2 INJECTION, SOLUTION SUBCUTANEOUS WEEKLY
Refills: 1 | OUTPATIENT
Start: 2025-01-03

## 2025-01-06 RX ORDER — SEMAGLUTIDE 2.68 MG/ML
2 INJECTION, SOLUTION SUBCUTANEOUS WEEKLY
Qty: 3 ML | Refills: 1 | Status: SHIPPED | OUTPATIENT
Start: 2025-01-06

## 2025-02-17 ENCOUNTER — TELEMEDICINE (OUTPATIENT)
Dept: FAMILY MEDICINE CLINIC | Age: 55
End: 2025-02-17
Payer: COMMERCIAL

## 2025-02-17 DIAGNOSIS — B37.2 CANDIDIASIS OF SKIN: ICD-10-CM

## 2025-02-17 DIAGNOSIS — E11.9 TYPE 2 DIABETES MELLITUS WITHOUT COMPLICATION, WITHOUT LONG-TERM CURRENT USE OF INSULIN: ICD-10-CM

## 2025-02-17 DIAGNOSIS — L73.2 HYDRADENITIS: ICD-10-CM

## 2025-02-17 DIAGNOSIS — L03.311 ABDOMINAL WALL CELLULITIS: Primary | ICD-10-CM

## 2025-02-17 PROCEDURE — 99214 OFFICE O/P EST MOD 30 MIN: CPT | Performed by: NURSE PRACTITIONER

## 2025-02-17 RX ORDER — KETOCONAZOLE 20 MG/G
1 CREAM TOPICAL DAILY
Qty: 15 G | Refills: 0 | Status: SHIPPED | OUTPATIENT
Start: 2025-02-17 | End: 2025-02-24

## 2025-02-17 RX ORDER — DOXYCYCLINE 100 MG/1
100 CAPSULE ORAL 2 TIMES DAILY
Qty: 20 CAPSULE | Refills: 0 | Status: SHIPPED | OUTPATIENT
Start: 2025-02-17 | End: 2025-02-27

## 2025-02-17 RX ORDER — FLUCONAZOLE 150 MG/1
150 TABLET ORAL ONCE
Qty: 1 TABLET | Refills: 0 | Status: SHIPPED | OUTPATIENT
Start: 2025-02-17 | End: 2025-02-17

## 2025-02-17 NOTE — PROGRESS NOTES
Chief Complaint  Caitlin Escalera presents to [facility] for Abscess (Sore spot along front of panty line. Noticed yesterday. )    Subjective     Abscess  Mode of Visit: Video  You have chosen to receive care through a telehealth visit.  Do you consent to use a video/audio connection for your medical care today? YES   Identity has been verified with 2 identifiers - (name and date of birth).    The visit included audio and video interaction. Patient is currently located : home and provider located :  Office  technical issues occurred during this visit cuasing us to have to switch to audio on 2 occasions, freezing of the video occurred .     Caitlin noticed yesterday.  It is red across/  putting polysporin and not working.  She does have HS but does not think it is similar to her previous flare ups.  She states that the area is not hard, it is however, very tender to touch- burning is reported.  Denies fever    Assessment and Plan     Diagnoses and all orders for this visit:    1. Abdominal wall cellulitis (Primary)  Comments:  difficult to determine extent based on video- unable to fully assess location.  Will treat with oral antibotic - advised if no improvement, to office or UC  Orders:  -     doxycycline (MONODOX) 100 MG capsule; Take 1 capsule by mouth 2 (Two) Times a Day for 10 days.  Dispense: 20 capsule; Refill: 0  -     fluconazole (Diflucan) 150 MG tablet; Take 1 tablet by mouth 1 (One) Time for 1 dose.  Dispense: 1 tablet; Refill: 0    2. Candidiasis of skin  Comments:  will treat as appears fungal although difficult to assess fully based on video visit  advise to follow up if persists, not resolved after 10 days  Orders:  -     ketoconazole (NIZORAL) 2 % cream; Apply 1 Application topically to the appropriate area as directed Daily for 7 days.  Dispense: 15 g; Refill: 0  -     fluconazole (Diflucan) 150 MG tablet; Take 1 tablet by mouth 1 (One) Time for 1 dose.  Dispense: 1  tablet; Refill: 0    3. Hydradenitis  Comments:  history of  consider when treatment prescribed based on lack of physical assessment    4. Type 2 diabetes mellitus without complication, without long-term current use of insulin  Comments:  at risk for potential complications based on history            Follow Up   Return if symptoms worsen or fail to improve.  Future Appointments   Date Time Provider Department Center   3/3/2025  4:30 PM Rosalina Woodard MD [facility]       New Medications Ordered This Visit   Medications    doxycycline (MONODOX) 100 MG capsule     Sig: Take 1 capsule by mouth 2 (Two) Times a Day for 10 days.     Dispense:  20 capsule     Refill:  0    ketoconazole (NIZORAL) 2 % cream     Sig: Apply 1 Application topically to the appropriate area as directed Daily for 7 days.     Dispense:  15 g     Refill:  0    fluconazole (Diflucan) 150 MG tablet     Sig: Take 1 tablet by mouth 1 (One) Time for 1 dose.     Dispense:  1 tablet     Refill:  0       There are no discontinued medications.       Review of Systems    Objective     There were no vitals filed for this visit.      Physical Exam  Constitutional:       Appearance: Normal appearance.   HENT:      Head: Normocephalic.   Musculoskeletal:         General: Normal range of motion.      Cervical back: Normal range of motion.   Skin:            Comments: Erythematous area noted on video with poor video quality    Neurological:      General: No focal deficit present.      Mental Status: She is alert and oriented to person, place, and time.   Psychiatric:         Mood and Affect: Mood normal.         Thought Content: Thought content normal.               Result Review          Allergies   Allergen Reactions    Morphine Anaphylaxis    Demerol [Meperidine] Dizziness and Myalgia    Lisinopril Dizziness and Myalgia      Past Medical History:   Diagnosis Date    ADHD (attention deficit hyperactivity disorder) 1980    Problems focusing    Allergic  10 years ago    Allergic to morphine/ demerol    Anxiety     Sometimes    Arthritis     I had a bone density 10 years ago  and a xray afew month ago    Asthma June 2021    I get short of breath easy    Cholelithiasis Nov 1987    Removed on 2/3/88    Cyst of kidney, acquired     Dyshidrosis     Dysthymic disorder     Elevated white blood cell count     Essential (primary) hypertension     GERD (gastroesophageal reflux disease)     About 10 years ago    Heartburn     Hidradenitis     History of medical problems     My brother passed on at 51 canc    HL (hearing loss) Feb 2021    Ringing in ears mild    HLD (hyperlipidemia)     Low back pain     Nicotine dependence, unspecified, uncomplicated     Obesity 20 years ago    Cant loose bit dont gain    Other specified menopausal and perimenopausal disorders     Overweight     Personal history of other diseases of urinary system     Proteinuria     Renal insufficiency     I have a left kidney only with a cyst in it    Solitary kidney     Type 2 diabetes mellitus without complication     Visual impairment Feb 2021    Noticed i some times get blurry vision     Current Outpatient Medications   Medication Sig Dispense Refill    Accu-Chek Guide test strip USE TO CHECK GLUCOSE 3 TIMES DAILY 100 each 5    Accu-Chek Softclix Lancets lancets USE 1 UNIT TO CHECK GLUCOSE ONCE DAILY      betamethasone valerate (VALISONE) 0.1 % ointment APPLY TOPICALLY TO THE APPROPRIATE AREA TWICE A DAY AS DIRECTED 45 g 0    Blood Glucose Monitoring Suppl (Accu-Chek Guide) w/Device kit USE UNIT TO CHECK GLUCOSE THREE TIMES DAILY      metFORMIN ER (GLUCOPHAGE-XR) 500 MG 24 hr tablet Take 1 tablet by mouth 2 (Two) Times a Day With Meals. 180 tablet 1    omeprazole (priLOSEC) 40 MG capsule TAKE 1 CAPSULE BY MOUTH EVERY DAY 90 capsule 1    ondansetron ODT (ZOFRAN-ODT) 4 MG disintegrating tablet PLACE 1 TABLET ON THE TONGUE EVERY 8 HOURS AS NEEDED FOR NAUSEA AND VOMITING 10 tablet 0    rosuvastatin (CRESTOR)  10 MG tablet TAKE 1 TABLET BY MOUTH EVERY DAY 90 tablet 0    Semaglutide, 2 MG/DOSE, (Ozempic, 2 MG/DOSE,) 8 MG/3ML solution pen-injector Inject 2 mg under the skin into the appropriate area as directed 1 (One) Time Per Week. 3 mL 1    triamcinolone (KENALOG) 0.1 % ointment APPLY TO AFFECTED AREA TWICE A DAY INTO RIGHT EAR 80 g 1    venlafaxine XR (EFFEXOR-XR) 75 MG 24 hr capsule TAKE 1 CAPSULE BY MOUTH DAILY  DAYS. 90 capsule 1    vitamin D (ERGOCALCIFEROL) 1.25 MG (85797 UT) capsule capsule TAKE 1 CAPSULE BY MOUTH 1 TIME PER WEEK. 13 capsule 0    doxycycline (MONODOX) 100 MG capsule Take 1 capsule by mouth 2 (Two) Times a Day for 10 days. 20 capsule 0    estradiol (ESTRACE VAGINAL) 0.1 MG/GM vaginal cream Insert 2 g into the vagina Daily. 42.5 g 2    fluconazole (Diflucan) 150 MG tablet Take 1 tablet by mouth 1 (One) Time for 1 dose. 1 tablet 0    gentamicin (GARAMYCIN) 0.1 % ointment APPLY 1 APPLICATION TOPICALLY TO THE APPROPRIATE AREA AS DIRECTED 3 (THREE) TIMES A DAY. (Patient not taking: Reported on 2/17/2025) 30 g 0    ketoconazole (NIZORAL) 2 % cream Apply 1 Application topically to the appropriate area as directed Daily for 7 days. 15 g 0     No current facility-administered medications for this visit.     Past Surgical History:   Procedure Laterality Date    CHOLECYSTECTOMY      HYSTERECTOMY      In 30s    SUBTOTAL HYSTERECTOMY      18 years ago i still have ovaries    TUBAL ABDOMINAL LIGATION Bilateral       Health Maintenance Due   Topic Date Due    ZOSTER VACCINE (1 of 2) Never done    LUNG CANCER SCREENING  Never done    DIABETIC EYE EXAM  05/16/2024    INFLUENZA VACCINE  07/01/2024    COVID-19 Vaccine (1 - 2024-25 season) Never done    HEMOGLOBIN A1C  11/30/2024    DIABETIC FOOT EXAM  01/08/2025    LIPID PANEL  02/23/2025    URINE MICROALBUMIN-CREATININE RATIO (uACR)  02/23/2025      Immunization History   Administered Date(s) Administered    Fluzone (or Fluarix & Flulaval for VFC) >6mos  09/18/2017    Fluzone Quad >6mos (Multi-dose) 09/01/2017    Hepatitis B Adult/Adolescent IM 04/07/2011, 05/17/2011, 10/22/2012    MMR 04/07/2011    Pneumococcal Conjugate 20-Valent (PCV20) 07/10/2024    Td (TDVAX) 09/29/1998, 04/07/2011    Tdap 08/30/2022         Part of this note may be an electronic transcription/translation of spoken language to printed   text using the Dragon Dictation System.      Sylvia Ervin, APRN

## 2025-02-21 DIAGNOSIS — L40.9 PSORIASIS: ICD-10-CM

## 2025-02-21 DIAGNOSIS — R11.0 NAUSEA: ICD-10-CM

## 2025-02-24 RX ORDER — GENTAMICIN SULFATE 1 MG/G
1 OINTMENT TOPICAL 3 TIMES DAILY
Qty: 30 G | Refills: 0 | Status: SHIPPED | OUTPATIENT
Start: 2025-02-24 | End: 2025-03-03 | Stop reason: SDUPTHER

## 2025-02-24 RX ORDER — ONDANSETRON 4 MG/1
4 TABLET, ORALLY DISINTEGRATING ORAL EVERY 8 HOURS PRN
Qty: 10 TABLET | Refills: 0 | Status: SHIPPED | OUTPATIENT
Start: 2025-02-24

## 2025-03-03 ENCOUNTER — OFFICE VISIT (OUTPATIENT)
Dept: FAMILY MEDICINE CLINIC | Age: 55
End: 2025-03-03
Payer: COMMERCIAL

## 2025-03-03 VITALS
SYSTOLIC BLOOD PRESSURE: 125 MMHG | TEMPERATURE: 97.6 F | HEART RATE: 100 BPM | WEIGHT: 161 LBS | BODY MASS INDEX: 29.63 KG/M2 | DIASTOLIC BLOOD PRESSURE: 76 MMHG | HEIGHT: 62 IN | OXYGEN SATURATION: 94 %

## 2025-03-03 DIAGNOSIS — E78.2 MIXED HYPERLIPIDEMIA: ICD-10-CM

## 2025-03-03 DIAGNOSIS — Z78.0 MENOPAUSE: ICD-10-CM

## 2025-03-03 DIAGNOSIS — E55.9 VITAMIN D DEFICIENCY: ICD-10-CM

## 2025-03-03 DIAGNOSIS — E11.9 TYPE 2 DIABETES MELLITUS WITHOUT COMPLICATION, WITHOUT LONG-TERM CURRENT USE OF INSULIN: Primary | ICD-10-CM

## 2025-03-03 DIAGNOSIS — R12 HEARTBURN: ICD-10-CM

## 2025-03-03 DIAGNOSIS — E53.8 B12 DEFICIENCY: ICD-10-CM

## 2025-03-03 DIAGNOSIS — F17.210 CIGARETTE NICOTINE DEPENDENCE WITHOUT COMPLICATION: ICD-10-CM

## 2025-03-03 DIAGNOSIS — E11.9 TYPE 2 DIABETES MELLITUS WITHOUT COMPLICATION, WITHOUT LONG-TERM CURRENT USE OF INSULIN: ICD-10-CM

## 2025-03-03 DIAGNOSIS — L40.9 PSORIASIS: ICD-10-CM

## 2025-03-03 PROCEDURE — 99214 OFFICE O/P EST MOD 30 MIN: CPT | Performed by: FAMILY MEDICINE

## 2025-03-03 RX ORDER — OMEPRAZOLE 40 MG/1
40 CAPSULE, DELAYED RELEASE ORAL DAILY
Qty: 90 CAPSULE | Refills: 1 | Status: SHIPPED | OUTPATIENT
Start: 2025-03-03

## 2025-03-03 RX ORDER — METFORMIN HYDROCHLORIDE 500 MG/1
500 TABLET, EXTENDED RELEASE ORAL 2 TIMES DAILY WITH MEALS
Qty: 180 TABLET | Refills: 1 | Status: SHIPPED | OUTPATIENT
Start: 2025-03-03

## 2025-03-03 RX ORDER — VENLAFAXINE HYDROCHLORIDE 75 MG/1
75 CAPSULE, EXTENDED RELEASE ORAL DAILY
Qty: 90 CAPSULE | Refills: 1 | Status: SHIPPED | OUTPATIENT
Start: 2025-03-03 | End: 2025-08-30

## 2025-03-03 RX ORDER — ROSUVASTATIN CALCIUM 10 MG/1
10 TABLET, COATED ORAL DAILY
Qty: 90 TABLET | Refills: 0 | Status: SHIPPED | OUTPATIENT
Start: 2025-03-03

## 2025-03-03 RX ORDER — SEMAGLUTIDE 2.68 MG/ML
2 INJECTION, SOLUTION SUBCUTANEOUS WEEKLY
Qty: 3 ML | Refills: 1 | Status: SHIPPED | OUTPATIENT
Start: 2025-03-03

## 2025-03-03 RX ORDER — GENTAMICIN SULFATE 1 MG/G
1 OINTMENT TOPICAL 3 TIMES DAILY
Qty: 30 G | Refills: 0 | Status: SHIPPED | OUTPATIENT
Start: 2025-03-03

## 2025-03-03 RX ORDER — FLUCONAZOLE 150 MG/1
TABLET ORAL
COMMUNITY
Start: 2025-02-17 | End: 2025-03-03

## 2025-03-03 RX ORDER — ERGOCALCIFEROL 1.25 MG/1
50000 CAPSULE, LIQUID FILLED ORAL
Qty: 13 CAPSULE | Refills: 0 | Status: SHIPPED | OUTPATIENT
Start: 2025-03-03

## 2025-03-03 NOTE — ASSESSMENT & PLAN NOTE
Well-controlled on omeprazole.  She tolerates well.  She is avoid spicy and acidic food in her diet  Orders:    omeprazole (priLOSEC) 40 MG capsule; Take 1 capsule by mouth Daily.

## 2025-03-03 NOTE — ASSESSMENT & PLAN NOTE
Stable on B12 supplementation.  Will check labs and adjust Tx plan pending results  Orders:    Vitamin B12; Future

## 2025-03-03 NOTE — ASSESSMENT & PLAN NOTE
Orders:    Comprehensive Metabolic Panel; Future    CBC (No Diff); Future    Lipid Panel; Future    Hemoglobin A1c; Future    Microalbumin / Creatinine Urine Ratio - Urine, Clean Catch; Future    metFORMIN ER (GLUCOPHAGE-XR) 500 MG 24 hr tablet; Take 1 tablet by mouth 2 (Two) Times a Day With Meals.    Semaglutide, 2 MG/DOSE, (Ozempic, 2 MG/DOSE,) 8 MG/3ML solution pen-injector; Inject 2 mg under the skin into the appropriate area as directed 1 (One) Time Per Week.

## 2025-03-03 NOTE — PROGRESS NOTES
Answers submitted by the patient for this visit:  Diabetes Questionnaire (Submitted on 2/24/2025)  Chief Complaint: Diabetes problem  Below 70: never  Caitlin Escalera presents to [facility].    Chief Complaint: diabetes follow up    Subjective     History of Present Illness:  Obesity  Pertinent negative symptoms include no abdominal pain, no chest pain, no chills, no cough, no fever, no nausea, no rash, no sore throat, no vomiting and no weakness.   Hand Injury   Pertinent negatives include no chest pain.     She presents for f/u for her chronic essential (primary) hypertension, she is on healthy diet and losing weight, current nonpharmacologic treatment includes low sodium diet and daily aerobic exercise.  She is also on Ozempic for weight loss.  Her current cardiac medication regimen includes an ACE inhibitor.  Compliance with treatment has been good; she takes her medication as directed.  She is tolerating the medication well without side effects.       She also presents with chronic type 2 diabetes mellitus without complications. She reports home blood glucose readings have been excellent, with average fasting glucoses running <120 mg/dL.   On low dose metformin 1- 500 mg daily and no diarrhea (higher doses cause diarrhea), Ozempic 2 mg weekly, and she stopped glipizide due to brain fog.   Recommend yearly eye exams, she has no no diabetic retinopathy, lazy R eye, h/o  L eye retinal bleed.  Denies depression/anxiety on venlafexine and she is happy and thriving, her anxiety is stable and well-controlled.             She presents with hypercholesterolemia/triglyceridemia, stable on atorvastatin 40 mg daily and she tolerates med well.  Recent labs reviewed and WNL.  Will check labs every 6 months and adjust medication pending results     She presents with chronic GERD and she is stable on omeprazole 40 mg nightly and tolerates medication well, she is to avoid spicy and acidic foods    "  She is still smoking and has had contact dermatitis with the nicotine patches.  Recommend cessation and 1 800 quit NOW for counseling    She cut her thumb this morning trimming her hair, wants evaluation for sutures today      She presents with H/O dyshydrandenitis,  she does have a couple sores in the axilla area,  well controlled with topical gentamicin, she needs refills    She also presents with intermittent psoriasis and is stable on topical steroid cream as needed.  She tolerates meds well and uses sparingly.  No rash present on hands today.       Result Review   The following data was reviewed by Rosalina Woodard MD on 03/03/2025.  Lab Results   Component Value Date    WBC 9.23 02/23/2024    HGB 16.4 (H) 02/23/2024    HCT 48.0 (H) 02/23/2024    MCV 93.4 02/23/2024     02/23/2024     Lab Results   Component Value Date    GLUCOSE 179 (H) 04/22/2024    BUN 13 04/22/2024    CREATININE 0.92 04/22/2024     04/22/2024    K 4.6 04/22/2024     04/22/2024    CALCIUM 9.5 04/22/2024    PROTEINTOT 6.8 02/23/2024    ALBUMIN 4.1 02/23/2024    ALT 22 02/23/2024    AST 20 02/23/2024    ALKPHOS 89 02/23/2024    BILITOT 0.5 02/23/2024    GLOB 2.7 02/23/2024    AGRATIO 1.5 02/23/2024    BCR 14.1 04/22/2024    ANIONGAP 11.0 04/22/2024    EGFR 74.6 04/22/2024     Lab Results   Component Value Date    CHOL 194 02/23/2024    CHLPL 197 01/11/2021    TRIG 191 (H) 02/23/2024    HDL 45 02/23/2024     (H) 02/23/2024     Lab Results   Component Value Date    TSH 1.890 12/14/2021     Lab Results   Component Value Date    HGBA1C 7.50 (H) 05/31/2024     No results found for: \"PSA\"  No results found for: \"IRON\"   Lab Results   Component Value Date    GWYP78KP 20.4 (L) 02/23/2024               Assessment and Plan:   Assessment & Plan  Type 2 diabetes mellitus without complication, without long-term current use of insulin  Overall stable and well-controlled.  No changes to current meds.  She tolerates Ozempic " well.  Recommend yearly eye exams.  Foot exam WNL    Orders:    Comprehensive Metabolic Panel; Future    CBC (No Diff); Future    Lipid Panel; Future    Hemoglobin A1c; Future    Microalbumin / Creatinine Urine Ratio - Urine, Clean Catch; Future    metFORMIN ER (GLUCOPHAGE-XR) 500 MG 24 hr tablet; Take 1 tablet by mouth 2 (Two) Times a Day With Meals.    Semaglutide, 2 MG/DOSE, (Ozempic, 2 MG/DOSE,) 8 MG/3ML solution pen-injector; Inject 2 mg under the skin into the appropriate area as directed 1 (One) Time Per Week.    Vitamin D deficiency  Stable on vitamin D supplementation.  Will check labs and adjust Tx plan pending results  Orders:    Vitamin D,25-Hydroxy; Future    vitamin D (ERGOCALCIFEROL) 1.25 MG (44680 UT) capsule capsule; Take 1 capsule by mouth Every 7 (Seven) Days.    Cigarette nicotine dependence without complication  Counseled on cessation and to call 1 800 quit now and she defers quitting at this time.         B12 deficiency  Stable on B12 supplementation.  Will check labs and adjust Tx plan pending results  Orders:    Vitamin B12; Future    Mixed hyperlipidemia     Well-controlled on rosuvastatin.  Will check labs and adjust Tx plan pending results.  Continue low-cholesterol diet  Orders:    rosuvastatin (CRESTOR) 10 MG tablet; Take 1 tablet by mouth Daily.    Heartburn  Well-controlled on omeprazole.  She tolerates well.  She is avoid spicy and acidic food in her diet  Orders:    omeprazole (priLOSEC) 40 MG capsule; Take 1 capsule by mouth Daily.    Psoriasis  Stable on current treatment plan  Orders:    gentamicin (GARAMYCIN) 0.1 % ointment; Apply 1 Application topically to the appropriate area as directed 3 (Three) Times a Day.    Type 2 diabetes mellitus without complication, without long-term current use of insulin      Orders:    Comprehensive Metabolic Panel; Future    CBC (No Diff); Future    Lipid Panel; Future    Hemoglobin A1c; Future    Microalbumin / Creatinine Urine Ratio - Urine,  Clean Catch; Future    metFORMIN ER (GLUCOPHAGE-XR) 500 MG 24 hr tablet; Take 1 tablet by mouth 2 (Two) Times a Day With Meals.    Semaglutide, 2 MG/DOSE, (Ozempic, 2 MG/DOSE,) 8 MG/3ML solution pen-injector; Inject 2 mg under the skin into the appropriate area as directed 1 (One) Time Per Week.    Heartburn    Orders:    omeprazole (priLOSEC) 40 MG capsule; Take 1 capsule by mouth Daily.    Mixed hyperlipidemia       Orders:    rosuvastatin (CRESTOR) 10 MG tablet; Take 1 tablet by mouth Daily.    Type 2 diabetes mellitus without complication, without long-term current use of insulin      Orders:    Comprehensive Metabolic Panel; Future    CBC (No Diff); Future    Lipid Panel; Future    Hemoglobin A1c; Future    Microalbumin / Creatinine Urine Ratio - Urine, Clean Catch; Future    metFORMIN ER (GLUCOPHAGE-XR) 500 MG 24 hr tablet; Take 1 tablet by mouth 2 (Two) Times a Day With Meals.    Semaglutide, 2 MG/DOSE, (Ozempic, 2 MG/DOSE,) 8 MG/3ML solution pen-injector; Inject 2 mg under the skin into the appropriate area as directed 1 (One) Time Per Week.    Menopause    Orders:    venlafaxine XR (EFFEXOR-XR) 75 MG 24 hr capsule; Take 1 capsule by mouth Daily for 180 days.               Objective     Medications:  Current Outpatient Medications   Medication Instructions    Accu-Chek Guide test strip USE TO CHECK GLUCOSE 3 TIMES DAILY    Accu-Chek Softclix Lancets lancets USE 1 UNIT TO CHECK GLUCOSE ONCE DAILY    betamethasone valerate (VALISONE) 0.1 % ointment 1 Application, Topical, 2 Times Daily, Apply to the affected area    Blood Glucose Monitoring Suppl (Accu-Chek Guide) w/Device kit USE UNIT TO CHECK GLUCOSE THREE TIMES DAILY    gentamicin (GARAMYCIN) 0.1 % ointment 1 Application, Topical, 3 Times Daily    metFORMIN ER (GLUCOPHAGE-XR) 500 mg, Oral, 2 Times Daily With Meals    omeprazole (PRILOSEC) 40 mg, Oral, Daily    ondansetron ODT (ZOFRAN-ODT) 4 mg, Translingual, Every 8 Hours PRN    Ozempic (2 MG/DOSE) 2 mg,  "Subcutaneous, Weekly    rosuvastatin (CRESTOR) 10 mg, Oral, Daily    triamcinolone (KENALOG) 0.1 % ointment APPLY TO AFFECTED AREA TWICE A DAY INTO RIGHT EAR    venlafaxine XR (EFFEXOR-XR) 75 mg, Oral, Daily    vitamin D (ERGOCALCIFEROL) 50,000 Units, Oral, Every 7 Days        Vital Signs:   /76 (BP Location: Right arm, Patient Position: Sitting, Cuff Size: Adult)   Pulse 100   Temp 97.6 °F (36.4 °C) (Oral)   Ht 157.5 cm (62.01\")   Wt 73 kg (161 lb)   SpO2 94%   BMI 29.44 kg/m²           BP Readings from Last 3 Encounters:   03/03/25 125/76   07/09/24 129/71   04/22/24 151/86      Wt Readings from Last 3 Encounters:   03/03/25 73 kg (161 lb)   07/09/24 77.1 kg (170 lb)   04/22/24 78.5 kg (173 lb)        Physical Exam:  Physical Exam  Vitals and nursing note reviewed.   Constitutional:       General: She is not in acute distress.     Appearance: Normal appearance. She is not ill-appearing, toxic-appearing or diaphoretic.   HENT:      Head: Normocephalic and atraumatic.      Right Ear: Tympanic membrane, ear canal and external ear normal.      Left Ear: Tympanic membrane, ear canal and external ear normal.      Nose: Nose normal. No congestion or rhinorrhea.      Mouth/Throat:      Pharynx: Oropharynx is clear. No oropharyngeal exudate or posterior oropharyngeal erythema.   Eyes:      Conjunctiva/sclera: Conjunctivae normal.   Cardiovascular:      Rate and Rhythm: Normal rate.      Pulses: Normal pulses.           Dorsalis pedis pulses are 2+ on the right side and 2+ on the left side.      Heart sounds: No murmur heard.  Pulmonary:      Effort: Pulmonary effort is normal. No respiratory distress.      Breath sounds: Normal breath sounds. No stridor. No wheezing, rhonchi or rales.   Abdominal:      General: Abdomen is flat.      Palpations: Abdomen is soft.      Tenderness: There is no abdominal tenderness. There is no guarding or rebound.      Hernia: No hernia is present.   Musculoskeletal:         " General: Normal range of motion.      Cervical back: Normal range of motion and neck supple. No rigidity.   Feet:      Right foot:      Protective Sensation: 7 sites tested.  7 sites sensed.      Skin integrity: Callus present. No ulcer or blister.      Toenail Condition: Right toenails are normal.      Left foot:      Protective Sensation: 7 sites tested.  7 sites sensed.      Skin integrity: Callus present. No ulcer or blister.      Toenail Condition: Left toenails are normal.      Comments: Diabetic Foot Exam Performed and Monofilament Test Performed     Lymphadenopathy:      Cervical: No cervical adenopathy.   Skin:     General: Skin is warm and dry.      Capillary Refill: Capillary refill takes less than 2 seconds.      Comments: V-shaped laceration left thumb at the MCP joint, incision appears superficial and she has full range of motion of her thumb.  No signs of infection but laceration occurred this morning.   Neurological:      General: No focal deficit present.      Mental Status: She is alert and oriented to person, place, and time.   Psychiatric:         Mood and Affect: Mood normal.         Behavior: Behavior normal.         Thought Content: Thought content normal.         Judgment: Judgment normal.           Review of Systems:  Review of Systems   Constitutional:  Negative for chills and fever.   HENT:  Negative for ear pain, sinus pressure and sore throat.    Eyes:  Positive for blurred vision. Negative for double vision.   Respiratory:  Negative for cough, shortness of breath and wheezing.    Cardiovascular:  Negative for chest pain, palpitations and leg swelling.   Gastrointestinal:  Negative for abdominal pain, blood in stool, constipation, diarrhea, nausea and vomiting.   Endocrine: Positive for polyuria. Negative for polydipsia.   Skin:  Negative for rash.   Neurological:  Negative for dizziness, tremors, speech difficulty, weakness, headache and confusion.   Psychiatric/Behavioral:  Negative  for sleep disturbance, suicidal ideas and depressed mood. The patient is not nervous/anxious.               Follow Up   Return in about 3 months (around 6/3/2025) for Recheck.    Part of this note may be an electronic transcription/translation of spoken language to printed   text using the Dragon Dictation System.              Health Maintenance   Topic Date Due    LUNG CANCER SCREENING  Never done    DIABETIC EYE EXAM  05/16/2024    HEMOGLOBIN A1C  11/30/2024    LIPID PANEL  02/23/2025    URINE MICROALBUMIN-CREATININE RATIO (uACR)  02/23/2025    ZOSTER VACCINE (1 of 2) 03/03/2025 (Originally 2/3/2021)    COVID-19 Vaccine (1 - 2024-25 season) 03/05/2025 (Originally 9/1/2024)    INFLUENZA VACCINE  03/31/2025 (Originally 7/1/2024)    ANNUAL PHYSICAL  07/09/2025    BMI FOLLOWUP  01/06/2026    DIABETIC FOOT EXAM  03/03/2026    COLORECTAL CANCER SCREENING  07/19/2026    MAMMOGRAM  08/07/2026    TDAP/TD VACCINES (4 - Td or Tdap) 08/30/2032    HEPATITIS C SCREENING  Completed    Hepatitis B  Completed    Pneumococcal Vaccine 50+  Completed          Medical History:  Medications Discontinued During This Encounter   Medication Reason    estradiol (ESTRACE VAGINAL) 0.1 MG/GM vaginal cream Patient Reported Not Taking    fluconazole (DIFLUCAN) 150 MG tablet *Therapy completed    rosuvastatin (CRESTOR) 10 MG tablet Reorder    omeprazole (priLOSEC) 40 MG capsule Reorder    metFORMIN ER (GLUCOPHAGE-XR) 500 MG 24 hr tablet Reorder    venlafaxine XR (EFFEXOR-XR) 75 MG 24 hr capsule Reorder    vitamin D (ERGOCALCIFEROL) 1.25 MG (11843 UT) capsule capsule Reorder    Semaglutide, 2 MG/DOSE, (Ozempic, 2 MG/DOSE,) 8 MG/3ML solution pen-injector Reorder    gentamicin (GARAMYCIN) 0.1 % ointment Reorder      Past Medical History:    ADHD (attention deficit hyperactivity disorder)    Problems focusing    Allergic    Allergic to morphine/ demerol    Anxiety    Sometimes    Arthritis    I had a bone density 10 years ago  and a xray afew month  ago    Asthma    I get short of breath easy    Cholelithiasis    Removed on 2/3/88    Cyst of kidney, acquired    Dyshidrosis    Dysthymic disorder    Elevated white blood cell count    Essential (primary) hypertension    GERD (gastroesophageal reflux disease)    About 10 years ago    Heartburn    Hidradenitis    History of medical problems    My brother passed on at 51 canc    HL (hearing loss)    Ringing in ears mild    HLD (hyperlipidemia)    Low back pain    Nicotine dependence, unspecified, uncomplicated    Obesity    Cant loose bit dont gain    Other specified menopausal and perimenopausal disorders    Overweight    Personal history of other diseases of urinary system    Proteinuria    Renal insufficiency    I have a left kidney only with a cyst in it    Solitary kidney    Type 2 diabetes mellitus without complication    Visual impairment    Noticed i some times get blurry vision     Past Surgical History:    CHOLECYSTECTOMY    HYSTERECTOMY    In 30s    SUBTOTAL HYSTERECTOMY    18 years ago i still have ovaries    TUBAL ABDOMINAL LIGATION      Family History   Problem Relation Age of Onset    Hypothyroidism Mother     Anxiety disorder Mother         Hers is really bad    Depression Mother     Diabetes Mother     Hearing loss Mother     Hyperlipidemia Mother     Miscarriages / Stillbirths Mother         Still birth at almost 9 month's gestation    Thyroid disease Mother     Vision loss Mother     Diabetes type II Father     Heart failure Father     Alcohol abuse Father         He became bipolar when he drank    Arthritis Father     Asthma Father     COPD Father     Depression Father     Diabetes Father     Heart disease Father         Congestive heart failure/ pacemaker    Hyperlipidemia Father     Mental illness Father         Bipolar/paranoid scitsofrania    Vision loss Father     Cancer Brother         Lymphoma/ bone    Early death Brother         Found cancer past 7 to 8 weeks later    Hearing loss Brother      Vision loss Brother     Cancer Maternal Grandfather         Passed away of Throat cancer    Cancer Maternal Grandmother         Pancreatic cancer    COPD Paternal Grandfather          from it    Diabetes Paternal Grandmother     Stroke Paternal Grandmother         Its what made her pass away    Anxiety disorder Sister         Hers is bad    Arthritis Sister     Asthma Sister     Depression Sister     Diabetes Sister     Learning disabilities Sister         Adhd    Vision loss Sister     Cancer Brother         Kidney cancer / healed no more cancer    Diabetes Brother     Hearing loss Brother     Vision loss Brother      Social History     Tobacco Use    Smoking status: Every Day     Current packs/day: 0.50     Average packs/day: 0.7 packs/day for 45.0 years (30.0 ttl pk-yrs)     Types: Cigarettes     Passive exposure: Current    Smokeless tobacco: Never    Tobacco comments:     Tried everything to quit   Substance Use Topics    Alcohol use: Not Currently     Comment: I probably have a taste once or twice a year       Health Maintenance Due   Topic Date Due    LUNG CANCER SCREENING  Never done    DIABETIC EYE EXAM  2024    HEMOGLOBIN A1C  2024    LIPID PANEL  2025    URINE MICROALBUMIN-CREATININE RATIO (uACR)  2025        Immunization History   Administered Date(s) Administered    Fluzone (or Fluarix & Flulaval for VFC) >6mos 2017    Fluzone Quad >6mos (Multi-dose) 2017    Hepatitis B Adult/Adolescent IM 2011, 2011, 10/22/2012    MMR 2011    Pneumococcal Conjugate 20-Valent (PCV20) 07/10/2024    Td (TDVAX) 1998, 2011    Tdap 2022       Allergies   Allergen Reactions    Morphine Anaphylaxis    Demerol [Meperidine] Dizziness and Myalgia    Lisinopril Dizziness and Myalgia

## 2025-03-03 NOTE — ASSESSMENT & PLAN NOTE
Overall stable and well-controlled.  No changes to current meds.  She tolerates Ozempic well.  Recommend yearly eye exams.  Foot exam WNL    Orders:    Comprehensive Metabolic Panel; Future    CBC (No Diff); Future    Lipid Panel; Future    Hemoglobin A1c; Future    Microalbumin / Creatinine Urine Ratio - Urine, Clean Catch; Future    metFORMIN ER (GLUCOPHAGE-XR) 500 MG 24 hr tablet; Take 1 tablet by mouth 2 (Two) Times a Day With Meals.    Semaglutide, 2 MG/DOSE, (Ozempic, 2 MG/DOSE,) 8 MG/3ML solution pen-injector; Inject 2 mg under the skin into the appropriate area as directed 1 (One) Time Per Week.

## 2025-03-03 NOTE — ASSESSMENT & PLAN NOTE
Stable on vitamin D supplementation.  Will check labs and adjust Tx plan pending results  Orders:    Vitamin D,25-Hydroxy; Future    vitamin D (ERGOCALCIFEROL) 1.25 MG (17874 UT) capsule capsule; Take 1 capsule by mouth Every 7 (Seven) Days.

## 2025-03-03 NOTE — ASSESSMENT & PLAN NOTE
Well-controlled on rosuvastatin.  Will check labs and adjust Tx plan pending results.  Continue low-cholesterol diet  Orders:    rosuvastatin (CRESTOR) 10 MG tablet; Take 1 tablet by mouth Daily.

## 2025-03-20 ENCOUNTER — TELEPHONE (OUTPATIENT)
Dept: FAMILY MEDICINE CLINIC | Age: 55
End: 2025-03-20
Payer: COMMERCIAL

## 2025-03-25 ENCOUNTER — LAB (OUTPATIENT)
Dept: LAB | Facility: HOSPITAL | Age: 55
End: 2025-03-25
Payer: COMMERCIAL

## 2025-03-25 DIAGNOSIS — E11.9 TYPE 2 DIABETES MELLITUS WITHOUT COMPLICATION, WITHOUT LONG-TERM CURRENT USE OF INSULIN: ICD-10-CM

## 2025-03-25 DIAGNOSIS — E55.9 VITAMIN D DEFICIENCY: ICD-10-CM

## 2025-03-25 DIAGNOSIS — E53.8 B12 DEFICIENCY: ICD-10-CM

## 2025-03-25 LAB
25(OH)D3 SERPL-MCNC: 52.7 NG/ML (ref 30–100)
ALBUMIN SERPL-MCNC: 3.7 G/DL (ref 3.5–5.2)
ALBUMIN UR-MCNC: 203.1 MG/DL
ALBUMIN/GLOB SERPL: 1.3 G/DL
ALP SERPL-CCNC: 87 U/L (ref 39–117)
ALT SERPL W P-5'-P-CCNC: 18 U/L (ref 1–33)
ANION GAP SERPL CALCULATED.3IONS-SCNC: 13 MMOL/L (ref 5–15)
AST SERPL-CCNC: 12 U/L (ref 1–32)
BILIRUB SERPL-MCNC: 0.6 MG/DL (ref 0–1.2)
BUN SERPL-MCNC: 16 MG/DL (ref 6–20)
BUN/CREAT SERPL: 15.5 (ref 7–25)
CALCIUM SPEC-SCNC: 9.3 MG/DL (ref 8.6–10.5)
CHLORIDE SERPL-SCNC: 101 MMOL/L (ref 98–107)
CHOLEST SERPL-MCNC: 179 MG/DL (ref 0–200)
CO2 SERPL-SCNC: 27 MMOL/L (ref 22–29)
CREAT SERPL-MCNC: 1.03 MG/DL (ref 0.57–1)
CREAT UR-MCNC: 126.4 MG/DL
DEPRECATED RDW RBC AUTO: 42.8 FL (ref 37–54)
EGFRCR SERPLBLD CKD-EPI 2021: 64.7 ML/MIN/1.73
ERYTHROCYTE [DISTWIDTH] IN BLOOD BY AUTOMATED COUNT: 12.3 % (ref 12.3–15.4)
GLOBULIN UR ELPH-MCNC: 2.9 GM/DL
GLUCOSE SERPL-MCNC: 195 MG/DL (ref 65–99)
HBA1C MFR BLD: 8 % (ref 4.8–5.6)
HCT VFR BLD AUTO: 47.7 % (ref 34–46.6)
HDLC SERPL-MCNC: 39 MG/DL (ref 40–60)
HGB BLD-MCNC: 16.2 G/DL (ref 12–15.9)
LDLC SERPL CALC-MCNC: 105 MG/DL (ref 0–100)
LDLC/HDLC SERPL: 2.56 {RATIO}
MCH RBC QN AUTO: 32.5 PG (ref 26.6–33)
MCHC RBC AUTO-ENTMCNC: 34 G/DL (ref 31.5–35.7)
MCV RBC AUTO: 95.6 FL (ref 79–97)
MICROALBUMIN/CREAT UR: 1606.8 MG/G (ref 0–29)
PLATELET # BLD AUTO: 260 10*3/MM3 (ref 140–450)
PMV BLD AUTO: 9.2 FL (ref 6–12)
POTASSIUM SERPL-SCNC: 4.3 MMOL/L (ref 3.5–5.2)
PROT SERPL-MCNC: 6.6 G/DL (ref 6–8.5)
RBC # BLD AUTO: 4.99 10*6/MM3 (ref 3.77–5.28)
SODIUM SERPL-SCNC: 141 MMOL/L (ref 136–145)
TRIGL SERPL-MCNC: 201 MG/DL (ref 0–150)
VIT B12 BLD-MCNC: 347 PG/ML (ref 211–946)
VLDLC SERPL-MCNC: 35 MG/DL (ref 5–40)
WBC NRBC COR # BLD AUTO: 9.7 10*3/MM3 (ref 3.4–10.8)

## 2025-03-25 PROCEDURE — 82607 VITAMIN B-12: CPT

## 2025-03-25 PROCEDURE — 82043 UR ALBUMIN QUANTITATIVE: CPT

## 2025-03-25 PROCEDURE — 36415 COLL VENOUS BLD VENIPUNCTURE: CPT

## 2025-03-25 PROCEDURE — 83036 HEMOGLOBIN GLYCOSYLATED A1C: CPT

## 2025-03-25 PROCEDURE — 82306 VITAMIN D 25 HYDROXY: CPT

## 2025-03-25 PROCEDURE — 80061 LIPID PANEL: CPT

## 2025-03-25 PROCEDURE — 80053 COMPREHEN METABOLIC PANEL: CPT

## 2025-03-25 PROCEDURE — 85027 COMPLETE CBC AUTOMATED: CPT

## 2025-03-25 PROCEDURE — 82570 ASSAY OF URINE CREATININE: CPT

## 2025-04-03 ENCOUNTER — TELEPHONE (OUTPATIENT)
Dept: FAMILY MEDICINE CLINIC | Age: 55
End: 2025-04-03
Payer: COMMERCIAL

## 2025-04-03 ENCOUNTER — RESULTS FOLLOW-UP (OUTPATIENT)
Dept: FAMILY MEDICINE CLINIC | Age: 55
End: 2025-04-03
Payer: COMMERCIAL

## 2025-04-03 DIAGNOSIS — E11.9 TYPE 2 DIABETES MELLITUS WITHOUT COMPLICATION, WITHOUT LONG-TERM CURRENT USE OF INSULIN: Primary | ICD-10-CM

## 2025-04-03 RX ORDER — DAPAGLIFLOZIN 5 MG/1
5 TABLET, FILM COATED ORAL DAILY
Qty: 90 TABLET | Refills: 1 | Status: SHIPPED | OUTPATIENT
Start: 2025-04-03

## 2025-04-03 NOTE — LETTER
Caitlin Escalera  [address]  [place] [facility] [address]    April 9, 2025     Dear Ms. Escalera:    Below are the results from your recent visit:    Resulted Orders   CBC (No Diff)   Result Value Ref Range    WBC 9.70 3.40 - 10.80 10*3/mm3    RBC 4.99 3.77 - 5.28 10*6/mm3    Hemoglobin 16.2 (H) 12.0 - 15.9 g/dL    Hematocrit 47.7 (H) 34.0 - 46.6 %    MCV 95.6 79.0 - 97.0 fL    MCH 32.5 26.6 - 33.0 pg    MCHC 34.0 31.5 - 35.7 g/dL    RDW 12.3 12.3 - 15.4 %    RDW-SD 42.8 37.0 - 54.0 fl    MPV 9.2 6.0 - 12.0 fL    Platelets 260 140 - 450 10*3/mm3   Comprehensive Metabolic Panel   Result Value Ref Range    Glucose 195 (H) 65 - 99 mg/dL    BUN 16 6 - 20 mg/dL    Creatinine 1.03 (H) 0.57 - 1.00 mg/dL    Sodium 141 136 - 145 mmol/L    Potassium 4.3 3.5 - 5.2 mmol/L    Chloride 101 98 - 107 mmol/L    CO2 27.0 22.0 - 29.0 mmol/L    Calcium 9.3 8.6 - 10.5 mg/dL    Total Protein 6.6 6.0 - 8.5 g/dL    Albumin 3.7 3.5 - 5.2 g/dL    ALT (SGPT) 18 1 - 33 U/L    AST (SGOT) 12 1 - 32 U/L    Alkaline Phosphatase 87 39 - 117 U/L    Total Bilirubin 0.6 0.0 - 1.2 mg/dL    Globulin 2.9 gm/dL    A/G Ratio 1.3 g/dL    BUN/Creatinine Ratio 15.5 7.0 - 25.0    Anion Gap 13.0 5.0 - 15.0 mmol/L    eGFR 64.7 >60.0 mL/min/1.73   Lipid Panel   Result Value Ref Range    Total Cholesterol 179 0 - 200 mg/dL    Triglycerides 201 (H) 0 - 150 mg/dL    HDL Cholesterol 39 (L) 40 - 60 mg/dL    LDL Cholesterol  105 (H) 0 - 100 mg/dL    VLDL Cholesterol 35 5 - 40 mg/dL    LDL/HDL Ratio 2.56    Hemoglobin A1c   Result Value Ref Range    Hemoglobin A1C 8.00 (H) 4.80 - 5.60 %   Vitamin D,25-Hydroxy   Result Value Ref Range    25 Hydroxy, Vitamin D 52.7 30.0 - 100.0 ng/ml   Vitamin B12   Result Value Ref Range    Vitamin B-12 347 211 - 946 pg/mL   Microalbumin / Creatinine Urine Ratio - Urine, Clean Catch   Result Value Ref Range    Microalbumin/Creatinine Ratio 1,606.8 (H) 0.0 - 29.0 mg/g    Creatinine, Urine 126.4 mg/dL    Microalbumin, Urine 203.1  mg/dL        Dear Caitlin,  Your labs show that your red blood cell count is borderline elevated.  This is chronic for you and secondary to tobacco use.  This will improve when you are able to quit smoking.  I recommend 1 800 quit now for counseling on quitting.  They will also provide you with nicotine patches.  You are starting to show early signs of kidney insufficiency by spilling microalbumin into your urine.  Keeping your diabetes under tight control can help prevent and even improve this.  Your diabetes hemoglobin A1c lab is worse as well as your triglycerides are elevated and both of these can be improved by healthy low carbohydrate/low sugar diet.  I am in a set up a referral to get you in with a dietitian to help with this.  I know you are off the glipizide but getting back on the glipizide could significantly improve your diabetes.  Instead I am going to start you on Farxiga to help lower your blood sugar, this medication causes you to urinate out more sugar through the kidneys and does put you at risk for vaginal yeast infections skin yeast infections and UTIs so please let me know if you have any of the symptoms!        Sincerely,        Rosalina Woodard MD

## 2025-04-03 NOTE — TELEPHONE ENCOUNTER
.  Caller: Caitlin Escalera    Relationship to patient: Self      Best call back number: [phone]    Provider: LIANNE WASHINGTON    Medication PA needed: DAPAGLIFLOZIN FARXIGA    Reason for call/Prior Auth:   NEEDED FOR INSURANCE COVERAGE

## 2025-04-04 ENCOUNTER — TELEPHONE (OUTPATIENT)
Dept: FAMILY MEDICINE CLINIC | Age: 55
End: 2025-04-04
Payer: COMMERCIAL

## 2025-04-04 NOTE — TELEPHONE ENCOUNTER
Caller: Caitlin Escalera    Relationship: Self    Best call back number: [phone]     What medication are you requesting: OZEMPIC     3 TOTAL TO BE CALLED IN      If a prescription is needed, what is your preferred pharmacy and phone number: [facility] #[id number] - [place], [place] - [address] - [phone]  - [phone] FX     Additional notes: PER PATIENT SHE RECEIVES A DISCOUNT IF 3 ARE CALLED IN AND THE PHARMACY ONLY HAS 2.    PLEASE RESEND SCRIPT WITH 3 AND CONTACT PATIENT WHEN COMPLETE.

## 2025-04-07 DIAGNOSIS — E11.9 TYPE 2 DIABETES MELLITUS WITHOUT COMPLICATION, WITHOUT LONG-TERM CURRENT USE OF INSULIN: ICD-10-CM

## 2025-04-07 RX ORDER — SEMAGLUTIDE 2.68 MG/ML
2 INJECTION, SOLUTION SUBCUTANEOUS WEEKLY
Qty: 9 ML | Refills: 0 | Status: SHIPPED | OUTPATIENT
Start: 2025-04-07

## 2025-05-12 ENCOUNTER — TELEPHONE (OUTPATIENT)
Dept: FAMILY MEDICINE CLINIC | Age: 55
End: 2025-05-12
Payer: COMMERCIAL

## 2025-05-12 NOTE — TELEPHONE ENCOUNTER
Caller: Caitlin Escalera    Relationship: Self    Best call back number:   Telephone Information:   Mobile [phone]        What medication are you requesting: YEAST INFECTION MEDICATION  FROM [facility]     Have you had these symptoms before:    [x] Yes  [] No    Have you been treated for these symptoms before:   [x] Yes  [] No    If a prescription is needed, what is your preferred pharmacy and phone number: [facility] #[id number] [address] & [place] - [phone] [facility] [phone]      Additional notes: PATIENT IS REQUESTING 2 PILLS JUST IN CASE [facility] DOES NOT CLEAR IT

## 2025-05-13 DIAGNOSIS — E11.9 TYPE 2 DIABETES MELLITUS WITHOUT COMPLICATION, WITHOUT LONG-TERM CURRENT USE OF INSULIN: ICD-10-CM

## 2025-05-13 DIAGNOSIS — B37.9 CANDIDIASIS: Primary | ICD-10-CM

## 2025-05-13 RX ORDER — FLUCONAZOLE 150 MG/1
150 TABLET ORAL DAILY
Qty: 2 TABLET | Refills: 0 | Status: SHIPPED | OUTPATIENT
Start: 2025-05-13

## 2025-05-13 RX ORDER — SEMAGLUTIDE 2.68 MG/ML
2 INJECTION, SOLUTION SUBCUTANEOUS WEEKLY
Qty: 3 ML | Refills: 2 | Status: SHIPPED | OUTPATIENT
Start: 2025-05-13

## 2025-05-13 NOTE — TELEPHONE ENCOUNTER
I will send in Diflucan 150 mg daily x 2 days.  If she does not improve she will need to come in and be seen.  Dr. Woodard

## 2025-05-16 ENCOUNTER — TELEPHONE (OUTPATIENT)
Dept: FAMILY MEDICINE CLINIC | Age: 55
End: 2025-05-16
Payer: COMMERCIAL

## 2025-05-20 DIAGNOSIS — E11.9 TYPE 2 DIABETES MELLITUS WITHOUT COMPLICATION, WITHOUT LONG-TERM CURRENT USE OF INSULIN: ICD-10-CM

## 2025-05-20 RX ORDER — SEMAGLUTIDE 2.68 MG/ML
2 INJECTION, SOLUTION SUBCUTANEOUS WEEKLY
Qty: 3 ML | Refills: 2 | Status: CANCELLED | OUTPATIENT
Start: 2025-05-20

## 2025-05-29 DIAGNOSIS — B37.9 CANDIDIASIS: ICD-10-CM

## 2025-05-29 RX ORDER — FLUCONAZOLE 150 MG/1
150 TABLET ORAL DAILY
Qty: 2 TABLET | Refills: 0 | Status: SHIPPED | OUTPATIENT
Start: 2025-05-29

## 2025-06-24 DIAGNOSIS — E78.2 MIXED HYPERLIPIDEMIA: ICD-10-CM

## 2025-06-24 RX ORDER — ROSUVASTATIN CALCIUM 10 MG/1
10 TABLET, COATED ORAL DAILY
Qty: 90 TABLET | Refills: 0 | Status: SHIPPED | OUTPATIENT
Start: 2025-06-24

## 2025-07-01 ENCOUNTER — OFFICE VISIT (OUTPATIENT)
Dept: FAMILY MEDICINE CLINIC | Age: 55
End: 2025-07-01
Payer: COMMERCIAL

## 2025-07-01 VITALS
BODY MASS INDEX: 30.44 KG/M2 | WEIGHT: 165.4 LBS | DIASTOLIC BLOOD PRESSURE: 82 MMHG | SYSTOLIC BLOOD PRESSURE: 137 MMHG | HEART RATE: 88 BPM | HEIGHT: 62 IN | OXYGEN SATURATION: 96 % | TEMPERATURE: 98.2 F

## 2025-07-01 DIAGNOSIS — N95.2 VAGINAL ATROPHY: ICD-10-CM

## 2025-07-01 DIAGNOSIS — F17.210 CIGARETTE NICOTINE DEPENDENCE WITHOUT COMPLICATION: ICD-10-CM

## 2025-07-01 DIAGNOSIS — R12 HEARTBURN: ICD-10-CM

## 2025-07-01 DIAGNOSIS — E78.2 MIXED HYPERLIPIDEMIA: ICD-10-CM

## 2025-07-01 DIAGNOSIS — L73.2 HYDRADENITIS: ICD-10-CM

## 2025-07-01 DIAGNOSIS — Z12.31 ENCOUNTER FOR SCREENING MAMMOGRAM FOR BREAST CANCER: ICD-10-CM

## 2025-07-01 DIAGNOSIS — Z00.00 ANNUAL PHYSICAL EXAM: Primary | ICD-10-CM

## 2025-07-01 DIAGNOSIS — Z78.0 POSTMENOPAUSAL STATE: ICD-10-CM

## 2025-07-01 DIAGNOSIS — E55.9 VITAMIN D DEFICIENCY: ICD-10-CM

## 2025-07-01 DIAGNOSIS — Z12.11 COLON CANCER SCREENING: ICD-10-CM

## 2025-07-01 DIAGNOSIS — E11.9 TYPE 2 DIABETES MELLITUS WITHOUT COMPLICATION, WITHOUT LONG-TERM CURRENT USE OF INSULIN: ICD-10-CM

## 2025-07-01 DIAGNOSIS — Z23 ENCOUNTER FOR IMMUNIZATION: ICD-10-CM

## 2025-07-01 DIAGNOSIS — L40.9 PSORIASIS: ICD-10-CM

## 2025-07-01 LAB
EXPIRATION DATE: ABNORMAL
HBA1C MFR BLD: 8.6 % (ref 4.5–5.7)
Lab: ABNORMAL

## 2025-07-01 RX ORDER — LANCETS
EACH MISCELLANEOUS
Qty: 100 EACH | Refills: 3 | Status: SHIPPED | OUTPATIENT
Start: 2025-07-01 | End: 2025-07-03

## 2025-07-01 RX ORDER — ESTRADIOL 0.1 MG/G
2 CREAM VAGINAL DAILY
Qty: 42.5 G | Refills: 3 | Status: SHIPPED | OUTPATIENT
Start: 2025-07-01

## 2025-07-01 NOTE — ASSESSMENT & PLAN NOTE
Stable and well-controlled on rosuvastatin, she tolerates med well.  Labs reviewed no changes needed to current meds or treatment plan

## 2025-07-01 NOTE — ASSESSMENT & PLAN NOTE
Stable vitamin D supplementation 50,000 units weekly.  Labs reviewed no changes needed to current meds or treatment plan

## 2025-07-01 NOTE — ASSESSMENT & PLAN NOTE
Will recheck hemoglobin A1c.  She is on Ozempic and metformin and tolerating meds well    Orders:    POC Glycosylated Hemoglobin (Hb A1C)    glucose blood (Accu-Chek Guide) test strip; Use as instructed    Accu-Chek Softclix Lancets lancets; Use as instructed    Blood Glucose Monitoring Suppl device; Use 1 each Daily.

## 2025-07-01 NOTE — PROGRESS NOTES
Caitlin Escalera presents to [facility].    Chief Complaint:  yearly exam    Subjective     History of Present Illness:  HPI  Patient is in today for yearly physical and WWE.  Last Menses: s/p hysterectomy  M0  Last mammogram: 2024  Last DEXA: 24  Last colonoscopy: 23-normal cologuard  Urinary symptoms: none  Sexual concerns: decreased libido due to vaginal dryness  EtOH use: NONE  Tobacco use: current 1/2 ppd  Drug use: NONE  She wears a seatbelt in the car  Last eye exam: sched next week, recc yearly eye exams, has a lazy eye  Dental exams:recc every 6 months, SHE GOES ONCE A YEAR  IMMUNIZATIONS:  COVID BOOSTER, FLU in FALL, SHINGRIX   DIET: HEALTHY  EXERCISE: WALKS AND SWIMS MOST DAYS  HEARING: NO ISSUES      She presents with chronic type 2 diabetes mellitus without complications. She reports home blood glucose readings have been excellent, with average fasting glucoses running <120 mg/dL.   On low dose metformin 500 mg daily and no diarrhea (higher doses cause diarrhea), Ozempic 2 mg weekly, and she stopped glipizide due to brain fog.   Recommend yearly eye exams, she has no no diabetic retinopathy, lazy R eye, h/o  L eye retinal bleed.  Denies depression/anxiety on venlafexine and she is happy and thriving, her anxiety is stable and well-controlled.     She presents for f/u for her chronic essential (primary) hypertension, she is on healthy diet and losing weight, current nonpharmacologic treatment includes low sodium diet and daily aerobic exercise.  Her current cardiac medication regimen includes an ACE inhibitor.  Compliance with treatment has been good; she takes her medication as directed.  She is tolerating the medication well without side effects.  Home BP checks are good.            She presents with hypercholesterolemia/hypertriglyceridemia, stable on atorvastatin 40 mg daily and she tolerates med well.  Recent labs from 3 months ago reviewed and WNL.   "Will check labs every 6 months and adjust medication pending results     She presents with chronic GERD,stable on omeprazole 40 mg nightly and tolerates medication well, she is to avoid spicy and acidic foods     She is still smoking, defers quitting, defers nicotine patches due to contact dermatitis.  Recommend cessation and 1 800 quit NOW for counseling     She presents with H/O dyshydrandenitis,  she does have a couple sores in the axilla area,  well controlled with topical gentamicin, she needs refills    She also presents with intermittent psoriasis in her hands, she is stable on topical steroid cream as needed.  She tolerates meds well and uses sparingly.  No rash present on hands today.       Result Review   The following data was reviewed by Rosalina Woodard MD on 07/01/2025.  Lab Results   Component Value Date    WBC 9.70 03/25/2025    HGB 16.2 (H) 03/25/2025    HCT 47.7 (H) 03/25/2025    MCV 95.6 03/25/2025     03/25/2025     Lab Results   Component Value Date    GLUCOSE 195 (H) 03/25/2025    BUN 16 03/25/2025    CREATININE 1.03 (H) 03/25/2025     03/25/2025    K 4.3 03/25/2025     03/25/2025    CALCIUM 9.3 03/25/2025    PROTEINTOT 6.6 03/25/2025    ALBUMIN 3.7 03/25/2025    ALT 18 03/25/2025    AST 12 03/25/2025    ALKPHOS 87 03/25/2025    BILITOT 0.6 03/25/2025    GLOB 2.9 03/25/2025    AGRATIO 1.3 03/25/2025    BCR 15.5 03/25/2025    ANIONGAP 13.0 03/25/2025    EGFR 64.7 03/25/2025     Lab Results   Component Value Date    CHOL 179 03/25/2025    CHLPL 197 01/11/2021    TRIG 201 (H) 03/25/2025    HDL 39 (L) 03/25/2025     (H) 03/25/2025     Lab Results   Component Value Date    TSH 1.890 12/14/2021     Lab Results   Component Value Date    HGBA1C 8.00 (H) 03/25/2025     No results found for: \"PSA\"  No results found for: \"IRON\"   Lab Results   Component Value Date    [id number] 52.7 03/25/2025               Assessment and Plan:   Assessment & Plan  Annual physical exam     "     Encounter for immunization  Recommend COVID, Shingrix and flu vaccine and she defers today       Colon cancer screening  Screening Cologuard performed in 2023 and up-to-date       Encounter for screening mammogram for breast cancer  Screening mammogram up-to-date, breast exam WNL       Postmenopausal state  DEXA is up-to-date       Vaginal atrophy  Will start on vaginal Estrace cream and she will continue coconut oil as needed  Orders:    estradiol (ESTRACE VAGINAL) 0.1 MG/GM vaginal cream; Insert 2 g into the vagina Daily.    Cigarette nicotine dependence without complication  Counseled on cessation.  She defers quitting at this time.  Recommend 1 800 quit now.  She did not tolerate nicotine patches due to a rash          Orders:    CT Chest Low Dose Wo; Future    Type 2 diabetes mellitus without complication, without long-term current use of insulin  Will recheck hemoglobin A1c.  She is on Ozempic and metformin and tolerating meds well    Orders:    POC Glycosylated Hemoglobin (Hb A1C)    glucose blood (Accu-Chek Guide) test strip; Use as instructed    Accu-Chek Softclix Lancets lancets; Use as instructed    Blood Glucose Monitoring Suppl device; Use 1 each Daily.    Mixed hyperlipidemia  Stable and well-controlled on rosuvastatin, she tolerates med well.  Labs reviewed no changes needed to current meds or treatment plan         Vitamin D deficiency  Stable vitamin D supplementation 50,000 units weekly.  Labs reviewed no changes needed to current meds or treatment plan       Heartburn  Stable on omeprazole.  She is to avoid spicy and acidic food in her diet       Psoriasis  She has stopped working with chemicals and so far her psoriasis is stable and well-controlled       Hydradenitis  Stable with topical gentamicin as needed.  No acute issues today                  Objective     Medications:  Current Outpatient Medications   Medication Instructions    Accu-Chek Softclix Lancets lancets Use as instructed     "betamethasone valerate (VALISONE) 0.1 % ointment 1 Application, Topical, 2 Times Daily, Apply to the affected area    Blood Glucose Monitoring Suppl (Accu-Chek Guide) w/Device kit USE UNIT TO CHECK GLUCOSE THREE TIMES DAILY    Blood Glucose Monitoring Suppl device 1 each, Not Applicable, Daily    estradiol (ESTRACE VAGINAL) 0.1 MG/GM vaginal cream 2 applicators, Vaginal, Daily    gentamicin (GARAMYCIN) 0.1 % ointment 1 Application, Topical, 3 Times Daily    glucose blood (Accu-Chek Guide) test strip Use as instructed    metFORMIN ER (GLUCOPHAGE-XR) 500 mg, Oral, 2 Times Daily With Meals    omeprazole (PRILOSEC) 40 mg, Oral, Daily    ondansetron ODT (ZOFRAN-ODT) 4 mg, Translingual, Every 8 Hours PRN    Ozempic (2 MG/DOSE) 2 mg, Subcutaneous, Weekly    rosuvastatin (CRESTOR) 10 mg, Oral, Daily    triamcinolone (KENALOG) 0.1 % ointment APPLY TO AFFECTED AREA TWICE A DAY INTO RIGHT EAR    venlafaxine XR (EFFEXOR-XR) 75 mg, Oral, Daily    vitamin D (ERGOCALCIFEROL) 50,000 Units, Oral, Every 7 Days        Vital Signs:   /82 (BP Location: Right arm, Patient Position: Sitting, Cuff Size: Adult)   Pulse 88   Temp 98.2 °F (36.8 °C) (Oral)   Ht 157.5 cm (62.01\")   Wt 75 kg (165 lb 6.4 oz)   SpO2 96%   BMI 30.24 kg/m²           BP Readings from Last 3 Encounters:   07/01/25 137/82   03/03/25 125/76   07/09/24 129/71      Wt Readings from Last 3 Encounters:   07/01/25 75 kg (165 lb 6.4 oz)   03/03/25 73 kg (161 lb)   07/09/24 77.1 kg (170 lb)        Physical Exam:  Physical Exam  Vitals and nursing note reviewed.   Constitutional:       General: She is not in acute distress.     Appearance: Normal appearance. She is not ill-appearing, toxic-appearing or diaphoretic.   HENT:      Head: Normocephalic and atraumatic.      Right Ear: Tympanic membrane, ear canal and external ear normal.      Left Ear: Tympanic membrane, ear canal and external ear normal.      Nose: No congestion or rhinorrhea.      Mouth/Throat:      " Mouth: Mucous membranes are moist.      Pharynx: Oropharynx is clear. No oropharyngeal exudate or posterior oropharyngeal erythema.   Eyes:      Extraocular Movements: Extraocular movements intact.      Conjunctiva/sclera: Conjunctivae normal.      Pupils: Pupils are equal, round, and reactive to light.   Cardiovascular:      Rate and Rhythm: Normal rate and regular rhythm.      Heart sounds: Normal heart sounds.   Pulmonary:      Effort: Pulmonary effort is normal.      Breath sounds: Normal breath sounds. No wheezing, rhonchi or rales.   Chest:   Breasts:     Right: Normal.      Left: Normal.   Abdominal:      General: Abdomen is flat.      Palpations: Abdomen is soft. There is no mass.      Tenderness: There is no abdominal tenderness.      Hernia: No hernia is present.   Musculoskeletal:      Cervical back: Neck supple. No rigidity.      Right lower leg: No edema.      Left lower leg: No edema.   Lymphadenopathy:      Cervical: No cervical adenopathy.      Upper Body:      Right upper body: No axillary adenopathy.      Left upper body: No axillary adenopathy.   Skin:     General: Skin is warm and dry.   Neurological:      General: No focal deficit present.      Mental Status: She is alert and oriented to person, place, and time. Mental status is at baseline.   Psychiatric:         Mood and Affect: Mood normal.         Behavior: Behavior normal.         Thought Content: Thought content normal.         Judgment: Judgment normal.           Review of Systems:  Review of Systems   Constitutional:  Positive for fatigue. Negative for chills, fever, unexpected weight gain and unexpected weight loss.   HENT:  Negative for ear pain, sinus pressure and sore throat.    Eyes:  Positive for blurred vision. Negative for double vision.   Respiratory:  Negative for cough, shortness of breath and wheezing.    Cardiovascular:  Positive for palpitations. Negative for chest pain.   Gastrointestinal:  Negative for abdominal pain,  blood in stool, constipation, diarrhea, nausea and vomiting.   Skin:  Negative for rash.   Neurological:  Negative for dizziness, headache and confusion.   Psychiatric/Behavioral:  Negative for sleep disturbance, suicidal ideas and depressed mood. The patient is not nervous/anxious.               Follow Up   Return in about 6 months (around 1/1/2026) for Recheck.    Part of this note may be an electronic transcription/translation of spoken language to printed   text using the Dragon Dictation System.              Health Maintenance   Topic Date Due    LUNG CANCER SCREENING  Never done    DIABETIC EYE EXAM  05/16/2024    HEMOGLOBIN A1C  09/25/2025    INFLUENZA VACCINE  12/28/2025 (Originally 7/1/2025)    ZOSTER VACCINE (1 of 2) 12/28/2025 (Originally 2/3/2021)    COVID-19 Vaccine (1 - 2024-25 season) 01/01/2026 (Originally 9/1/2024)    DIABETIC FOOT EXAM  03/03/2026    LIPID PANEL  03/25/2026    URINE MICROALBUMIN-CREATININE RATIO (uACR)  03/25/2026    ANNUAL PHYSICAL  07/01/2026    COLORECTAL CANCER SCREENING  07/19/2026    MAMMOGRAM  08/07/2026    TDAP/TD VACCINES (4 - Td or Tdap) 08/30/2032    HEPATITIS C SCREENING  Completed    Hepatitis B  Completed    Pneumococcal Vaccine 50+  Completed          Medical History:  Medications Discontinued During This Encounter   Medication Reason    fluconazole (Diflucan) 150 MG tablet *Therapy completed    dapagliflozin (Farxiga) 5 MG tablet tablet *Therapy completed    Accu-Chek Softclix Lancets lancets Reorder    Accu-Chek Guide test strip Reorder      Past Medical History:    ADHD (attention deficit hyperactivity disorder)    Problems focusing    Allergic    Allergic to morphine/ demerol    Anxiety    Sometimes    Arthritis    I had a bone density 10 years ago  and a xray afew month ago    Asthma    I get short of breath easy    Cholelithiasis    Removed on 2/3/88    Cyst of kidney, acquired    Dyshidrosis    Dysthymic disorder    Elevated white blood cell count    Essential  (primary) hypertension    GERD (gastroesophageal reflux disease)    About 10 years ago    Heartburn    Hidradenitis    History of medical problems    My brother passed on at 51 canc    HL (hearing loss)    Ringing in ears mild    HLD (hyperlipidemia)    Low back pain    Nicotine dependence, unspecified, uncomplicated    Obesity    Cant loose bit dont gain    Other specified menopausal and perimenopausal disorders    Overweight    Personal history of other diseases of urinary system    Proteinuria    Renal insufficiency    I have a left kidney only with a cyst in it    Solitary kidney    Type 2 diabetes mellitus without complication    Visual impairment    Noticed i some times get blurry vision     Past Surgical History:    CHOLECYSTECTOMY    HYSTERECTOMY    In 30s    SUBTOTAL HYSTERECTOMY    18 years ago i still have ovaries    TUBAL ABDOMINAL LIGATION      Family History   Problem Relation Age of Onset    Hypothyroidism Mother     Anxiety disorder Mother         Hers is really bad    Depression Mother     Diabetes Mother     Hearing loss Mother     Hyperlipidemia Mother     Miscarriages / Stillbirths Mother         Still birth at almost 9 month's gestation    Thyroid disease Mother     Vision loss Mother     Arthritis Mother     Diabetes type II Father     Heart failure Father     Alcohol abuse Father         He became bipolar when he drank    Arthritis Father     Asthma Father     COPD Father     Depression Father     Diabetes Father     Heart disease Father         Congestive heart failure/ pacemaker    Hyperlipidemia Father     Mental illness Father         Bipolar/paranoid scitsofrania    Vision loss Father     Cancer Brother         Lymphoma/ bone    Early death Brother         Found cancer past 7 to 8 weeks later    Hearing loss Brother     Vision loss Brother     Cancer Maternal Grandfather         Passed away of Throat cancer    Cancer Maternal Grandmother         Pancreatic cancer    COPD Paternal  Grandfather          from it    Diabetes Paternal Grandmother     Stroke Paternal Grandmother         Its what made her pass away    Anxiety disorder Sister         Hers is bad    Arthritis Sister     Asthma Sister     Depression Sister     Diabetes Sister     Learning disabilities Sister         Adhd    Vision loss Sister     Cancer Brother         Kidney cancer / healed no more cancer    Diabetes Brother     Hearing loss Brother     Vision loss Brother     Anxiety disorder Sister         Hers is bad    Arthritis Sister     Asthma Sister     Depression Sister     Diabetes Sister     Heart disease Sister         She had a heart attack on  had stent put in    Learning disabilities Sister         Adhd    Vision loss Sister     Other Sister         She was diagnosed with sorjuions syndrome    Cancer Brother         Kidney cancer / healed no more cancer    Diabetes Brother     Hearing loss Brother     Vision loss Brother      Social History     Tobacco Use    Smoking status: Every Day     Current packs/day: 0.50     Average packs/day: 0.8 packs/day for 60.0 years (45.0 ttl pk-yrs)     Types: Cigarettes     Passive exposure: Current    Smokeless tobacco: Never    Tobacco comments:     Tried everything to quit   Substance Use Topics    Alcohol use: Not Currently     Comment: I probably have a taste once or twice a year       Health Maintenance Due   Topic Date Due    LUNG CANCER SCREENING  Never done    DIABETIC EYE EXAM  2024    HEMOGLOBIN A1C  2025        Immunization History   Administered Date(s) Administered    Fluzone (or Fluarix & Flulaval for VFC) >6mos 2017    Fluzone Quad >6mos (Multi-dose) 2017    Hepatitis B Adult/Adolescent IM 2011, 2011, 10/22/2012    MMR 2011    Pneumococcal Conjugate 20-Valent (PCV20) 07/10/2024    Td (TDVAX) 1998, 2011    Tdap 2022       Allergies   Allergen Reactions    Morphine Anaphylaxis    Demerol  [Meperidine] Dizziness and Myalgia    Lisinopril Dizziness and Myalgia

## 2025-07-01 NOTE — ASSESSMENT & PLAN NOTE
Counseled on cessation.  She defers quitting at this time.  Recommend 1 800 quit now.  She did not tolerate nicotine patches due to a rash          Orders:    CT Chest Low Dose Wo; Future

## 2025-07-03 DIAGNOSIS — E11.9 TYPE 2 DIABETES MELLITUS WITHOUT COMPLICATION, WITHOUT LONG-TERM CURRENT USE OF INSULIN: ICD-10-CM

## 2025-07-03 DIAGNOSIS — E11.9 TYPE 2 DIABETES MELLITUS WITHOUT COMPLICATION, WITHOUT LONG-TERM CURRENT USE OF INSULIN: Primary | ICD-10-CM

## 2025-07-03 RX ORDER — LANCETS
1 EACH MISCELLANEOUS DAILY
Qty: 100 EACH | Refills: 3 | Status: SHIPPED | OUTPATIENT
Start: 2025-07-03

## 2025-07-03 RX ORDER — GLIPIZIDE 5 MG/1
5 TABLET, FILM COATED, EXTENDED RELEASE ORAL DAILY
Qty: 90 TABLET | Refills: 1 | Status: SHIPPED | OUTPATIENT
Start: 2025-07-03

## 2025-07-09 ENCOUNTER — NUTRITION (OUTPATIENT)
Dept: DIABETES SERVICES | Facility: HOSPITAL | Age: 55
End: 2025-07-09
Payer: COMMERCIAL

## 2025-07-09 DIAGNOSIS — E11.9 TYPE 2 DIABETES MELLITUS WITHOUT COMPLICATION, WITHOUT LONG-TERM CURRENT USE OF INSULIN: Primary | ICD-10-CM

## 2025-07-09 PROCEDURE — 97802 MEDICAL NUTRITION INDIV IN: CPT | Performed by: DIETITIAN, REGISTERED

## 2025-07-14 ENCOUNTER — TELEPHONE (OUTPATIENT)
Dept: FAMILY MEDICINE CLINIC | Age: 55
End: 2025-07-14
Payer: COMMERCIAL

## 2025-07-14 NOTE — TELEPHONE ENCOUNTER
Caller: Caitlin Escalera    Relationship: Self    Best call back number: [phone]     What medication are you requesting: LOWER DOSAGE OF OZEMPIC      If a prescription is needed, what is your preferred pharmacy and phone number: [facility] #[id number] - [place] - [address] - [phone]  - [phone] FX     Additional notes:

## 2025-07-15 RX ORDER — SEMAGLUTIDE 1.34 MG/ML
1 INJECTION, SOLUTION SUBCUTANEOUS WEEKLY
Qty: 3 ML | Refills: 2 | Status: SHIPPED | OUTPATIENT
Start: 2025-07-15

## 2025-07-15 NOTE — TELEPHONE ENCOUNTER
When she went to the dietican they recommended a lower dose so her body would get hungry . She does not think that she is eating enough .

## 2025-07-16 ENCOUNTER — HOSPITAL ENCOUNTER (OUTPATIENT)
Dept: CT IMAGING | Facility: HOSPITAL | Age: 55
Discharge: HOME | End: 2025-07-16
Admitting: FAMILY MEDICINE
Payer: COMMERCIAL

## 2025-07-16 DIAGNOSIS — F17.210 CIGARETTE NICOTINE DEPENDENCE WITHOUT COMPLICATION: ICD-10-CM

## 2025-07-16 PROCEDURE — 71271 CT THORAX LUNG CANCER SCR C-: CPT

## 2025-07-30 VITALS — BODY MASS INDEX: 30.36 KG/M2 | WEIGHT: 165 LBS | HEIGHT: 62 IN

## 2025-07-30 NOTE — PROGRESS NOTES
"  Caitlin Escalera is a 55 y.o. female who presents to [facility] for nutrition consult r/t diagnosis of T2DM, pt states she was diagnosed 10 years ago.  Caitlin Escalera is referred by Dr. Woodard.    Past Medical History:   Diagnosis Date    ADHD (attention deficit hyperactivity disorder) 1980    Problems focusing    Allergic 10 years ago    Allergic to morphine/ demerol    Anxiety     Sometimes    Arthritis     I had a bone density 10 years ago  and a xray afew month ago    Asthma June 2021    I get short of breath easy    Cholelithiasis Nov 1987    Removed on 2/3/88    Cyst of kidney, acquired     Dyshidrosis     Dysthymic disorder     Elevated white blood cell count     Essential (primary) hypertension     GERD (gastroesophageal reflux disease)     About 10 years ago    Heartburn     Hidradenitis     History of medical problems     My brother passed on at 51 canc    HL (hearing loss) Feb 2021    Ringing in ears mild    HLD (hyperlipidemia)     Low back pain     Nicotine dependence, unspecified, uncomplicated     Obesity 20 years ago    Cant loose bit dont gain    Other specified menopausal and perimenopausal disorders     Overweight     Personal history of other diseases of urinary system     Proteinuria     Renal insufficiency     I have a left kidney only with a cyst in it    Solitary kidney     Type 2 diabetes mellitus without complication     Visual impairment Feb 2021    Noticed i some times get blurry vision       Anthropometrics    157.5 cm (62\")  74.8 kg (165 lb)  30.18 kg/m²    Diabetes History    Diabetes History  What type of diabetes do you have?: Type 2  Length of Diabetes Diagnosis: 10 + years  Current DM knowledge: good  Have you had diabetes education/teaching in the past?: no  Do you test your blood sugar at home?: yes    Education Preferences    Education Preferences  What areas of diabetes would you like to learn about?: diet information    Nutrition " Information    Nutrition Information  Enter everything you can remember eating in the last 24 hours (1 day): pt usually only consumes 1 meal/day, mid-afternoon- grilled chicken salad; snacks- broccoli w/ ranch, pretzels, popcorn; beverages- coffee, water, soda (rare)  What is the biggest challenge you have with your diet?: Knowledge    Education Needs    DM Education Needs  Meter: Has own  Medication: Oral, Other injectables  Healthy Eating: RD consult, Reviewed meal plan, Basic meal plan provided  Motivation: Engaged  Teaching Method: Explanation, Discussion, Handouts  Patient Response: Verbalized understanding    DM Goals    DM Goals  Healthy Eating - Goal: Today  Being Active - Goal: Today  Taking Medication - Goal: Today  Monitoring - Goal: Today      Medications    Current Outpatient Medications   Medication    Accu-Chek Softclix Lancets    betamethasone valerate    Accu-Chek Guide    Blood Glucose Monitoring Suppl    estradiol    gentamicin    glipizide    glucose blood    metFORMIN ER    omeprazole    ondansetron ODT    rosuvastatin    Ozempic (1 MG/DOSE)    triamcinolone    venlafaxine XR    vitamin D     Labs       Lab Results   Component Value Date    CHOL 179 03/25/2025    TRIG 201 (H) 03/25/2025    HDL 39 (L) 03/25/2025     (H) 03/25/2025 July 2025 A1c 8.6%    Nutrition counseling provided on carbohydrate counting, portion control, measuring and reading labels.  Discussed eating out and gave suggestions on controlling carbohydrate intake and making healthier food choices.     Meal Plan:     Total Carbohydrates per meal: 2-3 carb servings/meal, 3 meals/day  Lean protein with meals.  Limit added fats.  Snacks: 1 carbohydrate serving (</= 22 g) + 1 protein serving.     Daily exercise encouraged (as recommended by healthcare provider). Discussed the benefits of exercise in lowering blood glucose, blood pressure, cholesterol, stress and controlling body weight.     Advised to continue daily blood  glucose monitoring to assist with understanding of factors affecting blood glucose and assist with management of diabetes.  Discussed and provided with target BG ranges.     Literature provided: Diabetes Nutrition Placemat, Choose Your Foods Booklet    Dietitian contact number provided.  Patient encouraged to call with questions or concerns.     Time spent with patient: 45 minutes    Maki Burns RDN, LY  07/09/2025

## 2025-08-12 ENCOUNTER — OFFICE VISIT (OUTPATIENT)
Dept: FAMILY MEDICINE CLINIC | Age: 55
End: 2025-08-12
Payer: COMMERCIAL

## 2025-08-12 VITALS
SYSTOLIC BLOOD PRESSURE: 154 MMHG | OXYGEN SATURATION: 97 % | WEIGHT: 165 LBS | HEART RATE: 90 BPM | DIASTOLIC BLOOD PRESSURE: 94 MMHG | BODY MASS INDEX: 30.36 KG/M2 | HEIGHT: 62 IN | TEMPERATURE: 98 F

## 2025-08-12 DIAGNOSIS — L73.2 HYDRADENITIS: Primary | ICD-10-CM

## 2025-08-12 DIAGNOSIS — E11.9 TYPE 2 DIABETES MELLITUS WITHOUT COMPLICATION, WITHOUT LONG-TERM CURRENT USE OF INSULIN: ICD-10-CM

## 2025-08-12 DIAGNOSIS — I10 ESSENTIAL (PRIMARY) HYPERTENSION: ICD-10-CM

## 2025-08-12 DIAGNOSIS — F17.210 CIGARETTE NICOTINE DEPENDENCE WITHOUT COMPLICATION: ICD-10-CM

## 2025-08-12 RX ORDER — SULFAMETHOXAZOLE AND TRIMETHOPRIM 800; 160 MG/1; MG/1
1 TABLET ORAL 2 TIMES DAILY
Qty: 20 TABLET | Refills: 0 | Status: SHIPPED | OUTPATIENT
Start: 2025-08-12 | End: 2025-08-22

## 2025-08-12 RX ORDER — FLUCONAZOLE 150 MG/1
150 TABLET ORAL ONCE
Qty: 1 TABLET | Refills: 0 | Status: SHIPPED | OUTPATIENT
Start: 2025-08-12 | End: 2025-08-13

## 2025-08-19 DIAGNOSIS — E55.9 VITAMIN D DEFICIENCY: ICD-10-CM

## 2025-08-19 RX ORDER — ERGOCALCIFEROL 1.25 MG/1
50000 CAPSULE, LIQUID FILLED ORAL WEEKLY
Qty: 13 CAPSULE | Refills: 0 | Status: SHIPPED | OUTPATIENT
Start: 2025-08-19